# Patient Record
Sex: FEMALE | Race: BLACK OR AFRICAN AMERICAN | NOT HISPANIC OR LATINO | Employment: UNEMPLOYED | RURAL
[De-identification: names, ages, dates, MRNs, and addresses within clinical notes are randomized per-mention and may not be internally consistent; named-entity substitution may affect disease eponyms.]

---
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
## 2022-01-04 ENCOUNTER — HOSPITAL ENCOUNTER (OUTPATIENT)
Dept: RADIOLOGY | Facility: HOSPITAL | Age: 22
Discharge: HOME | End: 2022-01-04
Attending: SURGERY
Payer: COMMERCIAL

## 2022-01-04 DIAGNOSIS — K44.9 HIATAL HERNIA: ICD-10-CM

## 2022-01-04 PROCEDURE — 74220 FL ESOPHAGRAM COMPLETE: ICD-10-PCS | Mod: 26,,, | Performed by: STUDENT IN AN ORGANIZED HEALTH CARE EDUCATION/TRAINING PROGRAM

## 2022-01-04 PROCEDURE — 74220 X-RAY XM ESOPHAGUS 1CNTRST: CPT | Mod: 26,,, | Performed by: STUDENT IN AN ORGANIZED HEALTH CARE EDUCATION/TRAINING PROGRAM

## 2022-01-04 PROCEDURE — 74220 X-RAY XM ESOPHAGUS 1CNTRST: CPT | Mod: TC

## 2022-03-21 ENCOUNTER — OFFICE VISIT (OUTPATIENT)
Dept: PAIN MEDICINE | Facility: CLINIC | Age: 22
End: 2022-03-21
Payer: MEDICAID

## 2022-03-21 ENCOUNTER — HOSPITAL ENCOUNTER (OUTPATIENT)
Dept: RADIOLOGY | Facility: HOSPITAL | Age: 22
Discharge: HOME | End: 2022-03-21
Attending: PAIN MEDICINE
Payer: COMMERCIAL

## 2022-03-21 VITALS
HEART RATE: 62 BPM | DIASTOLIC BLOOD PRESSURE: 59 MMHG | WEIGHT: 132 LBS | SYSTOLIC BLOOD PRESSURE: 103 MMHG | RESPIRATION RATE: 18 BRPM | HEIGHT: 66 IN | BODY MASS INDEX: 21.21 KG/M2

## 2022-03-21 DIAGNOSIS — G89.29 CHRONIC BILATERAL LOW BACK PAIN WITHOUT SCIATICA: ICD-10-CM

## 2022-03-21 DIAGNOSIS — Z79.899 ENCOUNTER FOR LONG-TERM (CURRENT) USE OF OTHER MEDICATIONS: ICD-10-CM

## 2022-03-21 DIAGNOSIS — M41.9 SCOLIOSIS OF LUMBAR SPINE, UNSPECIFIED SCOLIOSIS TYPE: Primary | ICD-10-CM

## 2022-03-21 DIAGNOSIS — M54.50 CHRONIC BILATERAL LOW BACK PAIN WITHOUT SCIATICA: ICD-10-CM

## 2022-03-21 DIAGNOSIS — M54.6 THORACIC SPINE PAIN: ICD-10-CM

## 2022-03-21 LAB

## 2022-03-21 PROCEDURE — 80305 DRUG TEST PRSMV DIR OPT OBS: CPT | Mod: PBBFAC | Performed by: PAIN MEDICINE

## 2022-03-21 PROCEDURE — 72070 X-RAY EXAM THORAC SPINE 2VWS: CPT | Mod: TC

## 2022-03-21 PROCEDURE — 99214 OFFICE O/P EST MOD 30 MIN: CPT | Mod: PBBFAC | Performed by: PAIN MEDICINE

## 2022-03-21 PROCEDURE — 72100 X-RAY EXAM L-S SPINE 2/3 VWS: CPT | Mod: TC

## 2022-03-21 PROCEDURE — 3008F PR BODY MASS INDEX (BMI) DOCUMENTED: ICD-10-PCS | Mod: CPTII,,, | Performed by: PAIN MEDICINE

## 2022-03-21 PROCEDURE — 99213 OFFICE O/P EST LOW 20 MIN: CPT | Mod: S$PBB,,, | Performed by: PAIN MEDICINE

## 2022-03-21 PROCEDURE — 72100 X-RAY EXAM L-S SPINE 2/3 VWS: CPT | Mod: 26,,, | Performed by: RADIOLOGY

## 2022-03-21 PROCEDURE — 3074F PR MOST RECENT SYSTOLIC BLOOD PRESSURE < 130 MM HG: ICD-10-PCS | Mod: CPTII,,, | Performed by: PAIN MEDICINE

## 2022-03-21 PROCEDURE — G0481 DRUG TEST DEF 8-14 CLASSES: HCPCS | Mod: ,,, | Performed by: CLINICAL MEDICAL LABORATORY

## 2022-03-21 PROCEDURE — 3008F BODY MASS INDEX DOCD: CPT | Mod: CPTII,,, | Performed by: PAIN MEDICINE

## 2022-03-21 PROCEDURE — 3078F DIAST BP <80 MM HG: CPT | Mod: CPTII,,, | Performed by: PAIN MEDICINE

## 2022-03-21 PROCEDURE — 1159F MED LIST DOCD IN RCRD: CPT | Mod: CPTII,,, | Performed by: PAIN MEDICINE

## 2022-03-21 PROCEDURE — 99213 PR OFFICE/OUTPT VISIT, EST, LEVL III, 20-29 MIN: ICD-10-PCS | Mod: S$PBB,,, | Performed by: PAIN MEDICINE

## 2022-03-21 PROCEDURE — 1159F PR MEDICATION LIST DOCUMENTED IN MEDICAL RECORD: ICD-10-PCS | Mod: CPTII,,, | Performed by: PAIN MEDICINE

## 2022-03-21 PROCEDURE — G0481 PR DRUG TEST DEF 8-14 CLASSES: ICD-10-PCS | Mod: ,,, | Performed by: CLINICAL MEDICAL LABORATORY

## 2022-03-21 PROCEDURE — 72070 XR THORACIC SPINE AP LATERAL: ICD-10-PCS | Mod: 26,,, | Performed by: RADIOLOGY

## 2022-03-21 PROCEDURE — 3078F PR MOST RECENT DIASTOLIC BLOOD PRESSURE < 80 MM HG: ICD-10-PCS | Mod: CPTII,,, | Performed by: PAIN MEDICINE

## 2022-03-21 PROCEDURE — 72100 XR LUMBAR SPINE AP AND LATERAL: ICD-10-PCS | Mod: 26,,, | Performed by: RADIOLOGY

## 2022-03-21 PROCEDURE — 3074F SYST BP LT 130 MM HG: CPT | Mod: CPTII,,, | Performed by: PAIN MEDICINE

## 2022-03-21 PROCEDURE — 72070 X-RAY EXAM THORAC SPINE 2VWS: CPT | Mod: 26,,, | Performed by: RADIOLOGY

## 2022-03-21 RX ORDER — IBUPROFEN 800 MG/1
800 TABLET ORAL 3 TIMES DAILY
COMMUNITY
End: 2023-03-08 | Stop reason: ALTCHOICE

## 2022-03-21 RX ORDER — TRAMADOL HYDROCHLORIDE 50 MG/1
50 TABLET ORAL EVERY 12 HOURS PRN
Qty: 15 TABLET | Refills: 0 | Status: SHIPPED | OUTPATIENT
Start: 2022-03-21 | End: 2022-03-22 | Stop reason: CLARIF

## 2022-03-21 NOTE — PROGRESS NOTES
She Disclaimer: This note has been generated using voice-recognition software. There may be typographical errors that have been missed during proof-reading        Patient ID: Kd Joshua is a 21 y.o. female.      Chief Complaint: Back Pain      21-year-old female returns for re-evaluation of chronic lower back pain.  She has a long history of scoliosis and underwent Falcon gilles placement in [facility] in 2017.  She experienced significant pressure and  pain post surgery, eventually  requiring hardware removal,  September 2021. The pressure sensation resolved but the pain remains chronic.  The pain is axial without leg involvement.  She completed physical therapy, March 2022, at [facility] with some relief.  She has not received postsurgical x-rays or MRIs.  She presents today requesting medication management.              Pain Assessment  Pain Assessment: 0-10  Pain Score:   8  Pain Location: Back  Pain Orientation: Lower, Mid, Upper  Pain Descriptors: Aching, Dull, Constant  Pain Frequency: Continuous  Pain Onset: Awakened from sleep  Clinical Progression: Gradually worsening  Aggravating Factors: Bending, Stretching, Straightening, Kneeling, Squatting, Standing, Walking, Stairs  Pain Intervention(s): Ambulation/increased activity, Heat applied, Home medication      A's of Opioid Risk Assessment  Activity:Patient can  perform ADL.   Analgesia:Patients pain is not controlled by current medication.   Adverse Effects: Patient denies constipation or sedation.  Aberrant Behavior:  reviewed with no aberrant drug seeking/taking behavior.      Patient denies any suicidal or homicidal ideations    Physical Therapy/Home Exercise: yes      X-Ray Lumbar Spine AP And Lateral  Narrative: EXAMINATION:  XR LUMBAR SPINE AP AND LATERAL    CLINICAL HISTORY:  Low back pain, unspecified    COMPARISON:  1 June 2012    FINDINGS:  No fracture is seen. Vertebral body heights are normal.  There is a thoracolumbar  scoliosis with the lumbar component apex to the right at L2, Seo angle is estimated 19 degrees appears increased from previous..  The disc space heights are well-maintained.  No significant degenerative change is present.  Impression: Increased lumbar scoliosis.  No other changes.    Electronically signed by: Triston Mcfarland  Date:    03/21/2022  Time:    14:26  X-Ray Thoracic Spine AP Lateral  Narrative: EXAMINATION:  XR THORACIC SPINE AP LATERAL    CLINICAL HISTORY:  Pain in thoracic spine    COMPARISON:  16 August 2021    FINDINGS:  No fracture is seen.  Spinal rods have been removed.  The scoliosis appears unchanged from previous.  Vertebral body heights and alignment are stable.  The disc space heights are well-maintained.  No significant degenerative change is present.  Impression: Previous spinal rods have been removed.  Scoliosis appears unchanged.    Electronically signed by: Triston Mcfarland  Date:    03/21/2022  Time:    14:24      Review of Systems   Constitutional: Negative.    HENT: Negative.    Eyes: Negative.    Respiratory: Negative.    Cardiovascular: Negative.    Gastrointestinal: Negative.    Endocrine: Negative.    Genitourinary: Negative.    Musculoskeletal: Positive for arthralgias, back pain and myalgias.   Integumentary:  Negative.   Neurological: Negative.    Hematological: Negative.    Psychiatric/Behavioral: Positive for sleep disturbance.             Past Medical History:   Diagnosis Date    Celiac disease     Chlamydia infection     Endometriosis     Herpes simplex virus (HSV) infection     Mental disorder     anexity and depression    Ovarian cyst     Scoliosis      Past Surgical History:   Procedure Laterality Date    SPINAL FUSION  10/03/2017    SURGICAL REMOVAL OF ENDOMETRIOSIS  2014    TONSILLECTOMY  2014     Social History     Socioeconomic History    Marital status: Single   Tobacco Use    Smoking status: Never Smoker    Smokeless tobacco: Never Used   Substance and  Sexual Activity    Alcohol use: Never    Drug use: Yes     Types: Marijuana     Comment: every couple of days    Sexual activity: Yes     Partners: Male     Birth control/protection: Condom     Comment: Last sexual intercourse 9/9/2021     Family History   Problem Relation Age of Onset    Lupus Paternal Grandmother     Cancer Maternal Grandmother     Diabetes Maternal Grandfather     Cancer Paternal Aunt     Cancer Maternal Aunt     Cancer Maternal Uncle      Review of patient's allergies indicates:   Allergen Reactions    Codeine     Oxycodone Itching     has a current medication list which includes the following prescription(s): ibuprofen, valacyclovir, famotidine, and tramadol.      Objective:  Vitals:    03/21/22 1305   BP: (!) 103/59   Pulse: 62   Resp: 18        Physical Exam  Vitals and nursing note reviewed.   Constitutional:       General: She is not in acute distress.     Appearance: Normal appearance. She is not ill-appearing, toxic-appearing or diaphoretic.   HENT:      Head: Normocephalic and atraumatic.      Nose: Nose normal.      Mouth/Throat:      Mouth: Mucous membranes are moist.   Eyes:      Extraocular Movements: Extraocular movements intact.      Pupils: Pupils are equal, round, and reactive to light.   Cardiovascular:      Rate and Rhythm: Normal rate and regular rhythm.      Heart sounds: Normal heart sounds.   Pulmonary:      Effort: Pulmonary effort is normal. No respiratory distress.      Breath sounds: Normal breath sounds. No stridor. No wheezing or rhonchi.   Abdominal:      General: Bowel sounds are normal.      Palpations: Abdomen is soft.   Musculoskeletal:         General: No swelling or deformity.      Cervical back: Normal and normal range of motion. No spasms or tenderness. No pain with movement. Normal range of motion.      Thoracic back: Normal.      Lumbar back: Spasms and tenderness present. No bony tenderness. Decreased range of motion. Negative right straight leg  raise test and negative left straight leg raise test. No scoliosis.      Right lower leg: No edema.      Left lower leg: No edema.   Skin:     General: Skin is warm.   Neurological:      General: No focal deficit present.      Mental Status: She is alert and oriented to person, place, and time. Mental status is at baseline.      Cranial Nerves: Cranial nerves are intact. No cranial nerve deficit.      Sensory: Sensation is intact. No sensory deficit.      Motor: No weakness.      Coordination: Coordination normal.      Gait: Gait normal.      Deep Tendon Reflexes: Reflexes are normal and symmetric.   Psychiatric:         Mood and Affect: Mood normal.         Behavior: Behavior normal.           Assessment:      1. Scoliosis of lumbar spine, unspecified scoliosis type    2. Chronic bilateral low back pain without sciatica    3. Thoracic spine pain    4. Encounter for long-term (current) use of other medications          Plan:  1. reviewed  2.Addiction, Dependency, Tolerance, Opioid abuse-misuse, Death, Diversion Discussed. Overdose reversal drug Naloxone discussed.  3. No opiates prescribed secondary to THC on urine drug screen POC  4. I will obtain x-rays of the thoracic and lumbar spine today  5. Urine drug screen point of care obtained and consistent with prescribed medications and medication refill date.  We will obtain a definitive urine drug screen  due to findings of THC on urine drug screen point of care.     Orders Placed This Encounter   Procedures    X-Ray Thoracic Spine AP Lateral     Standing Status:   Future     Number of Occurrences:   1     Standing Expiration Date:   3/21/2023     Order Specific Question:   May the Radiologist modify the order per protocol to meet the clinical needs of the patient?     Answer:   Yes     Order Specific Question:   Release to patient     Answer:   Immediate    Drug Screen Definitive 14, Urine     Standing Status:   Future     Number of Occurrences:   1      Standing Expiration Date:   5/20/2023     Order Specific Question:   Specimen Source     Answer:   Urine    POCT Urine Drug Screen Presump     Interpretive Information:     Negative:  No drug detected at the cut off level.   Positive:  This result represents presumptive positive for the   tested drug, other substances may yield a positive response other   than the analyte of interest. This result should be utilized for   diagnostic purpose only. Confirmation testing will be performed upon physician request only.         6. Consider Ultram and ibuprofen for chronic pain pending urine drug screen findings  7. Return in 2-3 weeks with me  for re-evaluation and medication refill       report:  Reviewed and inconsistent with medication use as prescribed. Report showing aberrant drug behavior and multi-sourcing.      The total time spent for evaluation and management on 03/22/2022 including reviewing separately obtained history, performing a medically appropriate exam and evaluation, documenting clinical information in the health record, independently interpreting results and communicating them to the patient/family/caregiver, and ordering medications/tests/procedures was between 15-29 minutes.    The above plan and management options were discussed at length with patient. Patient is in agreement with the above and verbalized understanding. It will be communicated with the referring physician via electronic record, fax, or mail.

## 2022-03-23 LAB
6-ACETYLMORPHINE, URINE (RUSH): NEGATIVE 10 NG/ML
7-AMINOCLONAZEPAM, URINE (RUSH): NEGATIVE 25 NG/ML
A-HYDROXYALPRAZOLAM, URINE (RUSH): NEGATIVE 25 NG/ML
ACETYL FENTANYL, URINE (RUSH): NEGATIVE 2.5 NG/ML
ACETYL NORFENTANYL OXALATE, URINE (RUSH): NEGATIVE 5 NG/ML
AMPHET UR QL SCN: NEGATIVE 100 NG/ML
BENZOYLECGONINE, URINE (RUSH): NEGATIVE 100 NG/ML
BUPRENORPHINE UR QL SCN: NEGATIVE 25 NG/ML
CODEINE, URINE (RUSH): NEGATIVE 25 NG/ML
CREAT UR-MCNC: 295 MG/DL (ref 28–219)
EDDP, URINE (RUSH): NEGATIVE 25 NG/ML
FENTANYL, URINE (RUSH): NEGATIVE 2.5 NG/ML
HYDROCODONE, URINE (RUSH): NEGATIVE 25 NG/ML
HYDROMORPHONE, URINE (RUSH): NEGATIVE 25 NG/ML
LORAZEPAM, URINE (RUSH): NEGATIVE 25 NG/ML
METHADONE UR QL SCN: NEGATIVE 25 NG/ML
METHAMPHET UR QL SCN: NEGATIVE 100 NG/ML
MORPHINE, URINE (RUSH): NEGATIVE 25 NG/ML
NORBUPRENORPHINE, URINE (RUSH): NEGATIVE 25 NG/ML
NORDIAZEPAM, URINE (RUSH): NEGATIVE 25 NG/ML
NORFENTANYL OXALATE, URINE (RUSH): NEGATIVE 5 NG/ML
NORHYDROCODONE, URINE (RUSH): NEGATIVE 50 NG/ML
NOROXYCODONE HCL, URINE (RUSH): NEGATIVE 50 NG/ML
OXAZEPAM, URINE (RUSH): NEGATIVE 25 NG/ML
OXYCODONE UR QL SCN: NEGATIVE 25 NG/ML
OXYMORPHONE, URINE (RUSH): NEGATIVE 25 NG/ML
PH UR STRIP: 7 PH UNITS
SP GR UR STRIP: 1.01
TAPENTADOL, URINE (RUSH): NEGATIVE 25 NG/ML
TEMAZEPAM, URINE (RUSH): NEGATIVE 25 NG/ML
THC-COOH, URINE (RUSH): >250 25 NG/ML
TRAMADOL, URINE (RUSH): NEGATIVE 100 NG/ML

## 2022-03-26 ENCOUNTER — PATIENT MESSAGE (OUTPATIENT)
Dept: PAIN MEDICINE | Facility: CLINIC | Age: 22
End: 2022-03-26
Payer: MEDICAID

## 2022-03-28 ENCOUNTER — TELEPHONE (OUTPATIENT)
Dept: PAIN MEDICINE | Facility: CLINIC | Age: 22
End: 2022-03-28
Payer: MEDICAID

## 2022-03-30 NOTE — TELEPHONE ENCOUNTER
Dr Luciano was notified of THC >250 on yesterday in which states she is not able to prescribe the tramadol, pt was notified via portal.

## 2022-04-12 NOTE — PROGRESS NOTES
She Disclaimer: This note has been generated using voice-recognition software. There may be typographical errors that have been missed during proof-reading        Patient ID: Kd Joshua is a 22 y.o. female.      Chief Complaint: Low-back Pain and Leg Pain (Mid to low back pain left leg)      22-year-old female returns today for re-evaluation of thoracic and lumbar pain.  X-rays were reviewed and discussed with patient.  She had Falcon rods removed at [facility] in 2021 and now the  lumbar spine spine x-ray reveales increased scoliosis by 19°.  She has not returned to [facility] but presents today requesting treatment for her severe spasticity,  pain and discomfort.  She notes the marijuana is only thing that has provided any effective pain relief at this time.            Pain Assessment  Pain Assessment: 0-10  Pain Score:   8  Pain Location: Back  Pain Orientation: Mid, Lower  Pain Radiating Towards: left leg spasms  Pain Descriptors: Constant, Pins and needles, Aching, Dull  Pain Frequency: Continuous  Pain Onset: Awakened from sleep  Clinical Progression: Not changed  Aggravating Factors: Bending, Kneeling, Squatting, Standing, Walking  Pain Intervention(s): Home medication, Heat applied, Rest      A's of Opioid Risk Assessment  Activity:Patient can  perform ADL.   Analgesia:Patients pain is not controlled by current medication.   Adverse Effects: Patient denies constipation or sedation.  Aberrant Behavior:  reviewed with no aberrant drug seeking/taking behavior.      Patient denies any suicidal or homicidal ideations    Physical Therapy/Home Exercise: yes      X-Ray Lumbar Spine AP And Lateral  Narrative: EXAMINATION:  XR LUMBAR SPINE AP AND LATERAL    CLINICAL HISTORY:  Low back pain, unspecified    COMPARISON:  1 June 2012    FINDINGS:  No fracture is seen. Vertebral body heights are normal.  There is a thoracolumbar scoliosis with the lumbar component apex to the right at L2, Seo angle is estimated 19 degrees  appears increased from previous..  The disc space heights are well-maintained.  No significant degenerative change is present.  Impression: Increased lumbar scoliosis.  No other changes.    Electronically signed by: Triston Mcfarland  Date:    03/21/2022  Time:    14:26  X-Ray Thoracic Spine AP Lateral  Narrative: EXAMINATION:  XR THORACIC SPINE AP LATERAL    CLINICAL HISTORY:  Pain in thoracic spine    COMPARISON:  16 August 2021    FINDINGS:  No fracture is seen.  Spinal rods have been removed.  The scoliosis appears unchanged from previous.  Vertebral body heights and alignment are stable.  The disc space heights are well-maintained.  No significant degenerative change is present.  Impression: Previous spinal rods have been removed.  Scoliosis appears unchanged.    Electronically signed by: Triston Mcfarland  Date:    03/21/2022  Time:    14:24      Review of Systems   Constitutional: Negative.    HENT: Negative.    Eyes: Negative.    Respiratory: Negative.    Cardiovascular: Negative.    Gastrointestinal: Negative.    Endocrine: Negative.    Genitourinary: Negative.    Musculoskeletal: Positive for arthralgias, back pain and myalgias.   Integumentary:  Negative.   Neurological: Negative.    Hematological: Negative.    Psychiatric/Behavioral: Negative.              Past Medical History:   Diagnosis Date    Celiac disease     Chlamydia infection     Endometriosis     Herpes simplex virus (HSV) infection     Mental disorder     anexity and depression    Ovarian cyst     Scoliosis      Past Surgical History:   Procedure Laterality Date    SPINAL FUSION  10/03/2017    SURGICAL REMOVAL OF ENDOMETRIOSIS  2014    TONSILLECTOMY  2014     Social History     Socioeconomic History    Marital status: Single   Tobacco Use    Smoking status: Never Smoker    Smokeless tobacco: Never Used   Substance and Sexual Activity    Alcohol use: Never    Drug use: Yes     Types: Marijuana     Comment: every couple of days     Sexual activity: Yes     Partners: Male     Birth control/protection: Condom     Comment: Last sexual intercourse 9/9/2021     Family History   Problem Relation Age of Onset    Lupus Paternal Grandmother     Cancer Maternal Grandmother     Diabetes Maternal Grandfather     Cancer Paternal Aunt     Cancer Maternal Aunt     Cancer Maternal Uncle      Review of patient's allergies indicates:   Allergen Reactions    Codeine     Oxycodone Itching     has a current medication list which includes the following prescription(s): ibuprofen, valacyclovir, amitriptyline, famotidine, pregabalin, and tizanidine.      Objective:  Vitals:    04/13/22 1427   BP: 104/64   Pulse: 98   Resp: 18        Physical Exam  Vitals and nursing note reviewed.   Constitutional:       General: She is not in acute distress.     Appearance: Normal appearance. She is not ill-appearing, toxic-appearing or diaphoretic.   HENT:      Head: Normocephalic and atraumatic.      Nose: Nose normal.      Mouth/Throat:      Mouth: Mucous membranes are moist.   Eyes:      Extraocular Movements: Extraocular movements intact.      Pupils: Pupils are equal, round, and reactive to light.   Cardiovascular:      Rate and Rhythm: Normal rate and regular rhythm.      Heart sounds: Normal heart sounds.   Pulmonary:      Effort: Pulmonary effort is normal. No respiratory distress.      Breath sounds: Normal breath sounds. No stridor. No wheezing or rhonchi.   Abdominal:      General: Bowel sounds are normal.      Palpations: Abdomen is soft.   Musculoskeletal:         General: No swelling or deformity.      Cervical back: Normal and normal range of motion. No spasms or tenderness. No pain with movement. Normal range of motion.      Thoracic back: Normal.      Lumbar back: Spasms and tenderness present. No bony tenderness. Decreased range of motion. Negative right straight leg raise test and negative left straight leg raise test. Scoliosis present.      Right lower  leg: No edema.      Left lower leg: No edema.   Skin:     General: Skin is warm.   Neurological:      General: No focal deficit present.      Mental Status: She is alert and oriented to person, place, and time. Mental status is at baseline.      Cranial Nerves: Cranial nerves are intact. No cranial nerve deficit.      Sensory: Sensation is intact. No sensory deficit.      Motor: No weakness.      Coordination: Coordination normal.      Gait: Gait normal.      Deep Tendon Reflexes: Reflexes are normal and symmetric.   Psychiatric:         Mood and Affect: Mood normal.         Behavior: Behavior normal.           Assessment:      1. Scoliosis of lumbar spine, unspecified scoliosis type    2. Chronic bilateral low back pain without sciatica    3. Thoracic spine pain          Plan:  1. reviewed  2.Addiction, Dependency, Tolerance, Opioid abuse-misuse, Death, Diversion Discussed. Overdose reversal drug Naloxone discussed.  3.Refill/Continue medications for pain control and function.  Start Elavil, Zanaflex and Lyrica for chronic pain and spasticity       Requested Prescriptions     Signed Prescriptions Disp Refills    amitriptyline (ELAVIL) 25 MG tablet 30 tablet 0     Sig: Take 1 tablet (25 mg total) by mouth nightly as needed for Insomnia.    tiZANidine (ZANAFLEX) 4 MG tablet 90 tablet 0     Sig: Take 1 tablet (4 mg total) by mouth 3 (three) times daily.    pregabalin (LYRICA) 75 MG capsule 60 capsule 0     Sig: Take 1 capsule (75 mg total) by mouth 2 (two) times daily.     4. Patient informed to follow-up at [facility] for worsening scoliosis   5.Return in 1 month with me  for re-evaluation and medication refill       report:  Reviewed and consistent with medication use as prescribed.      The total time spent for evaluation and management on 04/13/2022 including reviewing separately obtained history, performing a medically appropriate exam and evaluation, documenting clinical information in the health record,  independently interpreting results and communicating them to the patient/family/caregiver, and ordering medications/tests/procedures was between 15-29 minutes.    The above plan and management options were discussed at length with patient. Patient is in agreement with the above and verbalized understanding. It will be communicated with the referring physician via electronic record, fax, or mail.

## 2022-04-13 ENCOUNTER — OFFICE VISIT (OUTPATIENT)
Dept: PAIN MEDICINE | Facility: CLINIC | Age: 22
End: 2022-04-13
Payer: MEDICAID

## 2022-04-13 VITALS
HEART RATE: 98 BPM | BODY MASS INDEX: 20.41 KG/M2 | HEIGHT: 66 IN | DIASTOLIC BLOOD PRESSURE: 64 MMHG | WEIGHT: 127 LBS | RESPIRATION RATE: 18 BRPM | SYSTOLIC BLOOD PRESSURE: 104 MMHG

## 2022-04-13 DIAGNOSIS — M54.6 THORACIC SPINE PAIN: ICD-10-CM

## 2022-04-13 DIAGNOSIS — M41.9 SCOLIOSIS OF LUMBAR SPINE, UNSPECIFIED SCOLIOSIS TYPE: Primary | ICD-10-CM

## 2022-04-13 DIAGNOSIS — M54.50 CHRONIC BILATERAL LOW BACK PAIN WITHOUT SCIATICA: ICD-10-CM

## 2022-04-13 DIAGNOSIS — G89.29 CHRONIC BILATERAL LOW BACK PAIN WITHOUT SCIATICA: ICD-10-CM

## 2022-04-13 PROCEDURE — 3008F BODY MASS INDEX DOCD: CPT | Mod: CPTII,,, | Performed by: PAIN MEDICINE

## 2022-04-13 PROCEDURE — 3074F PR MOST RECENT SYSTOLIC BLOOD PRESSURE < 130 MM HG: ICD-10-PCS | Mod: CPTII,,, | Performed by: PAIN MEDICINE

## 2022-04-13 PROCEDURE — 99213 OFFICE O/P EST LOW 20 MIN: CPT | Mod: PBBFAC | Performed by: PAIN MEDICINE

## 2022-04-13 PROCEDURE — 99213 OFFICE O/P EST LOW 20 MIN: CPT | Mod: S$PBB,,, | Performed by: PAIN MEDICINE

## 2022-04-13 PROCEDURE — 3008F PR BODY MASS INDEX (BMI) DOCUMENTED: ICD-10-PCS | Mod: CPTII,,, | Performed by: PAIN MEDICINE

## 2022-04-13 PROCEDURE — 3074F SYST BP LT 130 MM HG: CPT | Mod: CPTII,,, | Performed by: PAIN MEDICINE

## 2022-04-13 PROCEDURE — 1159F MED LIST DOCD IN RCRD: CPT | Mod: CPTII,,, | Performed by: PAIN MEDICINE

## 2022-04-13 PROCEDURE — 1159F PR MEDICATION LIST DOCUMENTED IN MEDICAL RECORD: ICD-10-PCS | Mod: CPTII,,, | Performed by: PAIN MEDICINE

## 2022-04-13 PROCEDURE — 99213 PR OFFICE/OUTPT VISIT, EST, LEVL III, 20-29 MIN: ICD-10-PCS | Mod: S$PBB,,, | Performed by: PAIN MEDICINE

## 2022-04-13 PROCEDURE — 3078F PR MOST RECENT DIASTOLIC BLOOD PRESSURE < 80 MM HG: ICD-10-PCS | Mod: CPTII,,, | Performed by: PAIN MEDICINE

## 2022-04-13 PROCEDURE — 3078F DIAST BP <80 MM HG: CPT | Mod: CPTII,,, | Performed by: PAIN MEDICINE

## 2022-04-13 RX ORDER — AMITRIPTYLINE HYDROCHLORIDE 25 MG/1
25 TABLET, FILM COATED ORAL NIGHTLY PRN
Qty: 30 TABLET | Refills: 0 | Status: SHIPPED | OUTPATIENT
Start: 2022-04-13 | End: 2022-05-06

## 2022-04-13 RX ORDER — PREGABALIN 75 MG/1
75 CAPSULE ORAL 2 TIMES DAILY
Qty: 60 CAPSULE | Refills: 0 | Status: SHIPPED | OUTPATIENT
Start: 2022-04-13 | End: 2022-06-20 | Stop reason: SDUPTHER

## 2022-04-13 RX ORDER — TIZANIDINE 4 MG/1
4 TABLET ORAL 3 TIMES DAILY
Qty: 90 TABLET | Refills: 0 | Status: SHIPPED | OUTPATIENT
Start: 2022-04-13 | End: 2022-05-06

## 2022-06-17 ENCOUNTER — OFFICE VISIT (OUTPATIENT)
Dept: FAMILY MEDICINE | Facility: CLINIC | Age: 22
End: 2022-06-17
Payer: MEDICAID

## 2022-06-17 VITALS
RESPIRATION RATE: 16 BRPM | OXYGEN SATURATION: 99 % | HEART RATE: 87 BPM | TEMPERATURE: 98 F | WEIGHT: 130.38 LBS | BODY MASS INDEX: 20.95 KG/M2 | DIASTOLIC BLOOD PRESSURE: 81 MMHG | SYSTOLIC BLOOD PRESSURE: 133 MMHG | HEIGHT: 66 IN

## 2022-06-17 DIAGNOSIS — N80.9 ENDOMETRIOSIS: Primary | ICD-10-CM

## 2022-06-17 PROCEDURE — 1159F MED LIST DOCD IN RCRD: CPT | Mod: CPTII,,, | Performed by: FAMILY MEDICINE

## 2022-06-17 PROCEDURE — 99213 OFFICE O/P EST LOW 20 MIN: CPT | Mod: ,,, | Performed by: FAMILY MEDICINE

## 2022-06-17 PROCEDURE — 1159F PR MEDICATION LIST DOCUMENTED IN MEDICAL RECORD: ICD-10-PCS | Mod: CPTII,,, | Performed by: FAMILY MEDICINE

## 2022-06-17 PROCEDURE — 3079F DIAST BP 80-89 MM HG: CPT | Mod: CPTII,,, | Performed by: FAMILY MEDICINE

## 2022-06-17 PROCEDURE — 3075F SYST BP GE 130 - 139MM HG: CPT | Mod: CPTII,,, | Performed by: FAMILY MEDICINE

## 2022-06-17 PROCEDURE — 3075F PR MOST RECENT SYSTOLIC BLOOD PRESS GE 130-139MM HG: ICD-10-PCS | Mod: CPTII,,, | Performed by: FAMILY MEDICINE

## 2022-06-17 PROCEDURE — 3008F BODY MASS INDEX DOCD: CPT | Mod: CPTII,,, | Performed by: FAMILY MEDICINE

## 2022-06-17 PROCEDURE — 3079F PR MOST RECENT DIASTOLIC BLOOD PRESSURE 80-89 MM HG: ICD-10-PCS | Mod: CPTII,,, | Performed by: FAMILY MEDICINE

## 2022-06-17 PROCEDURE — 3008F PR BODY MASS INDEX (BMI) DOCUMENTED: ICD-10-PCS | Mod: CPTII,,, | Performed by: FAMILY MEDICINE

## 2022-06-17 PROCEDURE — 99213 PR OFFICE/OUTPT VISIT, EST, LEVL III, 20-29 MIN: ICD-10-PCS | Mod: ,,, | Performed by: FAMILY MEDICINE

## 2022-06-17 RX ORDER — ONDANSETRON 4 MG/1
8 TABLET, ORALLY DISINTEGRATING ORAL EVERY 8 HOURS PRN
Qty: 18 TABLET | Refills: 1 | Status: SHIPPED | OUTPATIENT
Start: 2022-06-17 | End: 2023-03-21 | Stop reason: ALTCHOICE

## 2022-06-17 RX ORDER — TRAMADOL HYDROCHLORIDE 50 MG/1
50 TABLET ORAL EVERY 6 HOURS
Qty: 15 TABLET | Refills: 0 | Status: SHIPPED | OUTPATIENT
Start: 2022-06-17 | End: 2023-03-08 | Stop reason: ALTCHOICE

## 2022-06-17 NOTE — PROGRESS NOTES
Subjective:       Patient ID: Kd Joshua is a 22 y.o. female.    Chief Complaint: Abdominal Pain (Patient present to clinic with lower abdominal cramps starting this morning around 0700. Have hx of endometriosis and ovary cyst. )    No fever no vaginal discharge pain due to endometriosis is chronic.  She tried hormone therapy which did not help she is seeing a new gynecologist.    Review of Systems      Objective:      Physical Exam  Constitutional:       General: She is in acute distress.      Appearance: She is not ill-appearing.   Cardiovascular:      Rate and Rhythm: Normal rate and regular rhythm.   Pulmonary:      Effort: Pulmonary effort is normal.      Breath sounds: Normal breath sounds.   Abdominal:      Tenderness: There is abdominal tenderness (Mild suprapubic in right adnexal tenderness no guarding).   Neurological:      Mental Status: She is alert.         Assessment:       Problem List Items Addressed This Visit    None         Plan:         patient advised that endometriosis is very difficult to treat.  Only definitive treatment is hysterectomy, which at the age of 22 obviously does not want to have done

## 2022-06-17 NOTE — LETTER
June 17, 2022      [facility]  [address] [place] [address] [place] [id number]  Phone: [phone]  Fax: [phone]       Patient: Kd Joshua   YOB: 2000  Date of Visit: 06/17/2022    To Whom It May Concern:    Edgardo Joshua  was at [facility] on 06/17/2022. The patient may return to work/school on 06/18/2022 with no restrictions. If you have any questions or concerns, or if I can be of further assistance, please do not hesitate to contact me.    Sincerely,    Dr. Kenny Blackburn

## 2022-06-20 ENCOUNTER — OFFICE VISIT (OUTPATIENT)
Dept: PAIN MEDICINE | Facility: CLINIC | Age: 22
End: 2022-06-20
Payer: MEDICAID

## 2022-06-20 VITALS
DIASTOLIC BLOOD PRESSURE: 82 MMHG | SYSTOLIC BLOOD PRESSURE: 124 MMHG | HEART RATE: 65 BPM | WEIGHT: 128.63 LBS | HEIGHT: 67 IN | BODY MASS INDEX: 20.19 KG/M2

## 2022-06-20 DIAGNOSIS — M54.50 CHRONIC BILATERAL LOW BACK PAIN WITHOUT SCIATICA: Chronic | ICD-10-CM

## 2022-06-20 DIAGNOSIS — M41.9 SCOLIOSIS OF LUMBAR SPINE, UNSPECIFIED SCOLIOSIS TYPE: Primary | Chronic | ICD-10-CM

## 2022-06-20 DIAGNOSIS — M54.6 THORACIC SPINE PAIN: Chronic | ICD-10-CM

## 2022-06-20 DIAGNOSIS — G89.29 CHRONIC BILATERAL LOW BACK PAIN WITHOUT SCIATICA: Chronic | ICD-10-CM

## 2022-06-20 PROCEDURE — 3008F BODY MASS INDEX DOCD: CPT | Mod: CPTII,,, | Performed by: PAIN MEDICINE

## 2022-06-20 PROCEDURE — 3074F PR MOST RECENT SYSTOLIC BLOOD PRESSURE < 130 MM HG: ICD-10-PCS | Mod: CPTII,,, | Performed by: PAIN MEDICINE

## 2022-06-20 PROCEDURE — 3079F DIAST BP 80-89 MM HG: CPT | Mod: CPTII,,, | Performed by: PAIN MEDICINE

## 2022-06-20 PROCEDURE — 3008F PR BODY MASS INDEX (BMI) DOCUMENTED: ICD-10-PCS | Mod: CPTII,,, | Performed by: PAIN MEDICINE

## 2022-06-20 PROCEDURE — 99214 OFFICE O/P EST MOD 30 MIN: CPT | Mod: S$PBB,,, | Performed by: PAIN MEDICINE

## 2022-06-20 PROCEDURE — 99214 PR OFFICE/OUTPT VISIT, EST, LEVL IV, 30-39 MIN: ICD-10-PCS | Mod: S$PBB,,, | Performed by: PAIN MEDICINE

## 2022-06-20 PROCEDURE — 3079F PR MOST RECENT DIASTOLIC BLOOD PRESSURE 80-89 MM HG: ICD-10-PCS | Mod: CPTII,,, | Performed by: PAIN MEDICINE

## 2022-06-20 PROCEDURE — 1159F MED LIST DOCD IN RCRD: CPT | Mod: CPTII,,, | Performed by: PAIN MEDICINE

## 2022-06-20 PROCEDURE — 1159F PR MEDICATION LIST DOCUMENTED IN MEDICAL RECORD: ICD-10-PCS | Mod: CPTII,,, | Performed by: PAIN MEDICINE

## 2022-06-20 PROCEDURE — 99213 OFFICE O/P EST LOW 20 MIN: CPT | Mod: PBBFAC | Performed by: PAIN MEDICINE

## 2022-06-20 PROCEDURE — 3074F SYST BP LT 130 MM HG: CPT | Mod: CPTII,,, | Performed by: PAIN MEDICINE

## 2022-06-20 RX ORDER — TIZANIDINE 4 MG/1
4 TABLET ORAL 3 TIMES DAILY
Qty: 90 TABLET | Refills: 2 | Status: SHIPPED | OUTPATIENT
Start: 2022-06-20 | End: 2023-03-21 | Stop reason: ALTCHOICE

## 2022-06-20 RX ORDER — PREGABALIN 75 MG/1
75 CAPSULE ORAL 2 TIMES DAILY
Qty: 60 CAPSULE | Refills: 2 | Status: SHIPPED | OUTPATIENT
Start: 2022-06-20 | End: 2022-09-18

## 2022-06-20 RX ORDER — AMITRIPTYLINE HYDROCHLORIDE 25 MG/1
25 TABLET, FILM COATED ORAL NIGHTLY
Qty: 90 TABLET | Refills: 2 | Status: SHIPPED | OUTPATIENT
Start: 2022-06-20 | End: 2023-03-08 | Stop reason: SDUPTHER

## 2022-06-20 NOTE — PROGRESS NOTES
She Disclaimer: This note has been generated using voice-recognition software. There may be typographical errors that have been missed during proof-reading        Patient ID: Kd Joshua is a 22 y.o. female.      Chief Complaint: Low-back Pain, Mid-back Pain, and Leg Pain      22-year-old female returns for re-evaluation of chronic lower back pain.  She is status post Falcon rods for scoliosis in 2017 at [facility]. The pain returned, but she is not a surgical candidate.  Elavil, Zanaflex and Lyrica are currently providing excellent relief without side effects.  She returns today for medication refill.  She denies any additional changes since the last office visit.                Pain Assessment  Pain Score:   8  Pain Location: Other (Comment) (mid back, lower back)  Pain Orientation: Left  Pain Radiating Towards: radiates to left leg to knee  Pain Descriptors: Stabbing, Sharp  Pain Frequency: Intermittent  Pain Onset: Awakened from sleep  Clinical Progression: Not changed  Aggravating Factors: Standing, Walking, Other (Comment) (sitting and lying)  Pain Intervention(s): Medication (See eMAR), Heat applied      A's of Opioid Risk Assessment  Activity:Patient can perform ADL.   Analgesia:Patients pain is  controlled by current medication.   Adverse Effects: Patient denies constipation or sedation.  Aberrant Behavior:  reviewed with no aberrant drug seeking/taking behavior.      Patient denies any suicidal or homicidal ideations    Physical Therapy/Home Exercise: yes      X-Ray Lumbar Spine AP And Lateral  Narrative: EXAMINATION:  XR LUMBAR SPINE AP AND LATERAL    CLINICAL HISTORY:  Low back pain, unspecified    COMPARISON:  1 June 2012    FINDINGS:  No fracture is seen. Vertebral body heights are normal.  There is a thoracolumbar scoliosis with the lumbar component apex to the right at L2, Seo angle is estimated 19 degrees appears increased from previous..  The disc space heights are well-maintained.  No  significant degenerative change is present.  Impression: Increased lumbar scoliosis.  No other changes.    Electronically signed by: Triston Mcfarland  Date:    03/21/2022  Time:    14:26  X-Ray Thoracic Spine AP Lateral  Narrative: EXAMINATION:  XR THORACIC SPINE AP LATERAL    CLINICAL HISTORY:  Pain in thoracic spine    COMPARISON:  16 August 2021    FINDINGS:  No fracture is seen.  Spinal rods have been removed.  The scoliosis appears unchanged from previous.  Vertebral body heights and alignment are stable.  The disc space heights are well-maintained.  No significant degenerative change is present.  Impression: Previous spinal rods have been removed.  Scoliosis appears unchanged.    Electronically signed by: Triston Mcfarland  Date:    03/21/2022  Time:    14:24      Review of Systems   Constitutional: Negative.    HENT: Negative.    Eyes: Negative.    Respiratory: Negative.    Cardiovascular: Negative.    Gastrointestinal: Negative.    Endocrine: Negative.    Genitourinary: Negative.    Musculoskeletal: Positive for arthralgias, back pain and myalgias.   Integumentary:  Negative.   Neurological: Negative.    Hematological: Negative.    Psychiatric/Behavioral: Negative.              Past Medical History:   Diagnosis Date    Celiac disease     Chlamydia infection     Endometriosis     Herpes simplex virus (HSV) infection     Mental disorder     anexity and depression    Ovarian cyst     Scoliosis      Past Surgical History:   Procedure Laterality Date    SPINAL FUSION  10/03/2017    SURGICAL REMOVAL OF ENDOMETRIOSIS  2014    TONSILLECTOMY  2014     Social History     Socioeconomic History    Marital status: Single   Tobacco Use    Smoking status: Never Smoker    Smokeless tobacco: Never Used   Substance and Sexual Activity    Alcohol use: Never    Drug use: Yes     Types: Marijuana     Comment: every couple of days    Sexual activity: Yes     Partners: Male     Birth control/protection: Condom      Comment: Last sexual intercourse 9/9/2021     Family History   Problem Relation Age of Onset    Lupus Paternal Grandmother     Cancer Maternal Grandmother     Diabetes Maternal Grandfather     Cancer Paternal Aunt     Cancer Maternal Aunt     Cancer Maternal Uncle      Review of patient's allergies indicates:   Allergen Reactions    Codeine     Oxycodone Itching     has a current medication list which includes the following prescription(s): ibuprofen, ondansetron, tramadol, valacyclovir, amitriptyline, famotidine, pregabalin, and tizanidine.      Objective:  Vitals:    06/20/22 1358   BP: 124/82   Pulse: 65        Physical Exam  Vitals and nursing note reviewed.   Constitutional:       General: She is not in acute distress.     Appearance: Normal appearance. She is not ill-appearing, toxic-appearing or diaphoretic.   HENT:      Head: Normocephalic and atraumatic.      Nose: Nose normal.      Mouth/Throat:      Mouth: Mucous membranes are moist.   Eyes:      Extraocular Movements: Extraocular movements intact.      Pupils: Pupils are equal, round, and reactive to light.   Cardiovascular:      Rate and Rhythm: Normal rate and regular rhythm.      Heart sounds: Normal heart sounds.   Pulmonary:      Effort: Pulmonary effort is normal. No respiratory distress.      Breath sounds: Normal breath sounds. No stridor. No wheezing or rhonchi.   Abdominal:      General: Bowel sounds are normal.      Palpations: Abdomen is soft.   Musculoskeletal:         General: No swelling, tenderness or deformity.      Cervical back: Normal and normal range of motion. No spasms or tenderness. No pain with movement. Normal range of motion.      Thoracic back: Normal.      Lumbar back: No spasms, tenderness or bony tenderness. Decreased range of motion. Negative right straight leg raise test and negative left straight leg raise test. No scoliosis.      Right lower leg: No edema.      Left lower leg: No edema.   Skin:     General: Skin  is warm.   Neurological:      General: No focal deficit present.      Mental Status: She is alert and oriented to person, place, and time. Mental status is at baseline.      Cranial Nerves: No cranial nerve deficit.      Sensory: Sensation is intact. No sensory deficit.      Motor: No weakness.      Coordination: Coordination normal.      Gait: Gait normal.      Deep Tendon Reflexes: Reflexes are normal and symmetric.   Psychiatric:         Mood and Affect: Mood normal.         Behavior: Behavior normal.           Assessment:      1. Scoliosis of lumbar spine, unspecified scoliosis type    2. Chronic bilateral low back pain without sciatica    3. Thoracic spine pain          Plan:  1. reviewed  2.Addiction, Dependency, Tolerance, Opioid abuse-misuse, Death, Diversion Discussed. Overdose reversal drug Naloxone discussed.  3.Refill/Continue medications for pain control and function       Requested Prescriptions     Signed Prescriptions Disp Refills    amitriptyline (ELAVIL) 25 MG tablet 90 tablet 2     Sig: Take 1 tablet (25 mg total) by mouth nightly.    pregabalin (LYRICA) 75 MG capsule 60 capsule 2     Sig: Take 1 capsule (75 mg total) by mouth 2 (two) times daily.    tiZANidine (ZANAFLEX) 4 MG tablet 90 tablet 2     Sig: Take 1 tablet (4 mg total) by mouth 3 (three) times daily.     4.Patient defers nerve block injections, physical therapy or surgical consultation     5.Return in 3 months with me  for re-evaluation and medication refill       report:  Reviewed and consistent with medication use as prescribed.      The total time spent for evaluation and management on 06/20/2022 including reviewing separately obtained history, performing a medically appropriate exam and evaluation, documenting clinical information in the health record, independently interpreting results and communicating them to the patient/family/caregiver, and ordering medications/tests/procedures was between 15-29 minutes.    The above  plan and management options were discussed at length with patient. Patient is in agreement with the above and verbalized understanding. It will be communicated with the referring physician via electronic record, fax, or mail.

## 2022-06-21 ENCOUNTER — OFFICE VISIT (OUTPATIENT)
Dept: OBSTETRICS AND GYNECOLOGY | Facility: CLINIC | Age: 22
End: 2022-06-21
Payer: MEDICAID

## 2022-06-21 VITALS — OXYGEN SATURATION: 99 % | WEIGHT: 132.19 LBS | HEIGHT: 67 IN | BODY MASS INDEX: 20.75 KG/M2

## 2022-06-21 DIAGNOSIS — Z32.02 URINE PREGNANCY TEST NEGATIVE: ICD-10-CM

## 2022-06-21 DIAGNOSIS — R10.2 PELVIC PAIN: ICD-10-CM

## 2022-06-21 DIAGNOSIS — Z87.42 HISTORY OF ENDOMETRIOSIS: Primary | ICD-10-CM

## 2022-06-21 LAB
B-HCG UR QL: NEGATIVE
BILIRUB SERPL-MCNC: NEGATIVE MG/DL
BLOOD URINE, POC: NEGATIVE
COLOR, POC UA: NORMAL
CTP QC/QA: YES
GLUCOSE UR QL STRIP: NEGATIVE
KETONES UR QL STRIP: NORMAL
LEUKOCYTE ESTERASE URINE, POC: NEGATIVE
NITRITE, POC UA: NEGATIVE
PH, POC UA: 6.5
PROTEIN, POC: NEGATIVE
SPECIFIC GRAVITY, POC UA: 1.02
UROBILINOGEN, POC UA: 1

## 2022-06-21 PROCEDURE — 87491 CHLAMYDIA/GONORRHOEAE(GC), PCR: ICD-10-PCS | Mod: ,,, | Performed by: CLINICAL MEDICAL LABORATORY

## 2022-06-21 PROCEDURE — 1159F PR MEDICATION LIST DOCUMENTED IN MEDICAL RECORD: ICD-10-PCS | Mod: CPTII,,, | Performed by: ADVANCED PRACTICE MIDWIFE

## 2022-06-21 PROCEDURE — 87591 N.GONORRHOEAE DNA AMP PROB: CPT | Mod: ,,, | Performed by: CLINICAL MEDICAL LABORATORY

## 2022-06-21 PROCEDURE — 1159F MED LIST DOCD IN RCRD: CPT | Mod: CPTII,,, | Performed by: ADVANCED PRACTICE MIDWIFE

## 2022-06-21 PROCEDURE — 99214 PR OFFICE/OUTPT VISIT, EST, LEVL IV, 30-39 MIN: ICD-10-PCS | Mod: ,,, | Performed by: ADVANCED PRACTICE MIDWIFE

## 2022-06-21 PROCEDURE — 87591 CHLAMYDIA/GONORRHOEAE(GC), PCR: ICD-10-PCS | Mod: ,,, | Performed by: CLINICAL MEDICAL LABORATORY

## 2022-06-21 PROCEDURE — 81025 URINE PREGNANCY TEST: CPT | Mod: RHCUB | Performed by: ADVANCED PRACTICE MIDWIFE

## 2022-06-21 PROCEDURE — 81003 URINALYSIS AUTO W/O SCOPE: CPT | Mod: RHCUB | Performed by: ADVANCED PRACTICE MIDWIFE

## 2022-06-21 PROCEDURE — 87491 CHLMYD TRACH DNA AMP PROBE: CPT | Mod: ,,, | Performed by: CLINICAL MEDICAL LABORATORY

## 2022-06-21 PROCEDURE — 3008F PR BODY MASS INDEX (BMI) DOCUMENTED: ICD-10-PCS | Mod: CPTII,,, | Performed by: ADVANCED PRACTICE MIDWIFE

## 2022-06-21 PROCEDURE — 3008F BODY MASS INDEX DOCD: CPT | Mod: CPTII,,, | Performed by: ADVANCED PRACTICE MIDWIFE

## 2022-06-21 PROCEDURE — 99214 OFFICE O/P EST MOD 30 MIN: CPT | Mod: ,,, | Performed by: ADVANCED PRACTICE MIDWIFE

## 2022-06-21 RX ORDER — LEVONORGESTREL AND ETHINYL ESTRADIOL 0.1-0.02MG
1 KIT ORAL DAILY
Qty: 28 TABLET | Refills: 5 | Status: SHIPPED | OUTPATIENT
Start: 2022-06-21 | End: 2022-07-11

## 2022-06-21 NOTE — PROGRESS NOTES
Subjective:       Patient ID: Kd Joshua is a 22 y.o. female.    Chief Complaint: endometriosis pain    HPI  Ms Joshua is here because of pelvic pain from endometriosis.  She had surgery in 2017 for the endometriosis and it helped her pain for about two years.   She has regular periods.  LMP was 6/17/22 and they last 4 days with 2-3 heavy days.  She has to go to the ER sometimes when the pain is severe.   She reports pain of varying intensity with intercourse.   She is seen by pain management for chronic back pain.   She did have Falcon rods but those have been removed.  She uses condoms when she has sex.   She takes ibuprofen, tramadol or Lyrica for pain.    She has not had a recent pelvic ultrasound.    Review of Systems   Constitutional: Positive for fatigue.   HENT: Negative.    Respiratory: Negative.    Cardiovascular: Negative.    Gastrointestinal: Positive for abdominal pain.   Endocrine: Negative.    Genitourinary: Positive for dyspareunia and pelvic pain. Negative for dysuria, flank pain and frequency.         Objective:      Physical Exam  Constitutional:       Appearance: She is normal weight.   HENT:      Head: Normocephalic.      Nose: Nose normal.   Eyes:      Extraocular Movements: Extraocular movements intact.   Cardiovascular:      Rate and Rhythm: Normal rate.   Pulmonary:      Effort: Pulmonary effort is normal.   Abdominal:      General: Abdomen is flat. There is no distension.      Palpations: Abdomen is soft. There is no mass.      Tenderness: There is abdominal tenderness.   Skin:     General: Skin is warm and dry.   Neurological:      Mental Status: She is alert and oriented to person, place, and time.   Psychiatric:         Mood and Affect: Mood normal.         Behavior: Behavior normal.         Assessment:       Problem List Items Addressed This Visit    None     Visit Diagnoses     History of endometriosis    -  Primary    Relevant Medications    levonorgestrel-ethinyl estradiol  (RASHMIE,PARESHE,LESSINA) 0.1-20 mg-mcg per tablet    Other Relevant Orders    US Pelvis Complete Non OB    Pelvic pain        Relevant Medications    levonorgestrel-ethinyl estradiol (AVIANE,ALESSE,LESSINA) 0.1-20 mg-mcg per tablet    Other Relevant Orders    POCT URINALYSIS W/O SCOPE (Completed)    POCT URINE PREGNANCY (Completed)    Chlamydia/GC, PCR          Plan:     Schedule pelvic u/s    Start Cornelius, ACHES discussed.    F/u 2 weeks.  To ER for severe abdominal pain.    Will refer to gynecologist after f/u if needed.

## 2022-06-22 ENCOUNTER — TELEPHONE (OUTPATIENT)
Dept: OBSTETRICS AND GYNECOLOGY | Facility: CLINIC | Age: 22
End: 2022-06-22
Payer: MEDICAID

## 2022-06-22 LAB
CHLAMYDIA BY PCR: NEGATIVE
N. GONORRHOEAE (GC) BY PCR: NEGATIVE

## 2022-06-22 NOTE — TELEPHONE ENCOUNTER
----- Message from Annmarie Gutiérrez CNM sent at 6/22/2022  9:51 AM CDT -----  Review with pt.as neg.

## 2022-07-05 ENCOUNTER — TELEPHONE (OUTPATIENT)
Dept: OBSTETRICS AND GYNECOLOGY | Facility: CLINIC | Age: 22
End: 2022-07-05
Payer: MEDICAID

## 2022-07-11 ENCOUNTER — TELEPHONE (OUTPATIENT)
Dept: OBSTETRICS AND GYNECOLOGY | Facility: CLINIC | Age: 22
End: 2022-07-11
Payer: MEDICAID

## 2022-07-11 ENCOUNTER — OFFICE VISIT (OUTPATIENT)
Dept: OBSTETRICS AND GYNECOLOGY | Facility: CLINIC | Age: 22
End: 2022-07-11
Payer: MEDICAID

## 2022-07-11 VITALS
HEART RATE: 126 BPM | DIASTOLIC BLOOD PRESSURE: 83 MMHG | WEIGHT: 126.63 LBS | HEIGHT: 67 IN | BODY MASS INDEX: 19.88 KG/M2 | OXYGEN SATURATION: 98 % | SYSTOLIC BLOOD PRESSURE: 129 MMHG

## 2022-07-11 DIAGNOSIS — R10.2 PELVIC PAIN IN FEMALE: ICD-10-CM

## 2022-07-11 DIAGNOSIS — N80.9 ENDOMETRIOSIS: ICD-10-CM

## 2022-07-11 DIAGNOSIS — Z30.016 ENCOUNTER FOR INITIAL PRESCRIPTION OF TRANSDERMAL PATCH HORMONAL CONTRACEPTIVE DEVICE: Primary | ICD-10-CM

## 2022-07-11 PROCEDURE — 3074F SYST BP LT 130 MM HG: CPT | Mod: CPTII,,, | Performed by: ADVANCED PRACTICE MIDWIFE

## 2022-07-11 PROCEDURE — 1159F MED LIST DOCD IN RCRD: CPT | Mod: CPTII,,, | Performed by: ADVANCED PRACTICE MIDWIFE

## 2022-07-11 PROCEDURE — 3008F PR BODY MASS INDEX (BMI) DOCUMENTED: ICD-10-PCS | Mod: CPTII,,, | Performed by: ADVANCED PRACTICE MIDWIFE

## 2022-07-11 PROCEDURE — 1159F PR MEDICATION LIST DOCUMENTED IN MEDICAL RECORD: ICD-10-PCS | Mod: CPTII,,, | Performed by: ADVANCED PRACTICE MIDWIFE

## 2022-07-11 PROCEDURE — 99213 PR OFFICE/OUTPT VISIT, EST, LEVL III, 20-29 MIN: ICD-10-PCS | Mod: ,,, | Performed by: ADVANCED PRACTICE MIDWIFE

## 2022-07-11 PROCEDURE — 3074F PR MOST RECENT SYSTOLIC BLOOD PRESSURE < 130 MM HG: ICD-10-PCS | Mod: CPTII,,, | Performed by: ADVANCED PRACTICE MIDWIFE

## 2022-07-11 PROCEDURE — 3008F BODY MASS INDEX DOCD: CPT | Mod: CPTII,,, | Performed by: ADVANCED PRACTICE MIDWIFE

## 2022-07-11 PROCEDURE — 3079F PR MOST RECENT DIASTOLIC BLOOD PRESSURE 80-89 MM HG: ICD-10-PCS | Mod: CPTII,,, | Performed by: ADVANCED PRACTICE MIDWIFE

## 2022-07-11 PROCEDURE — 99213 OFFICE O/P EST LOW 20 MIN: CPT | Mod: ,,, | Performed by: ADVANCED PRACTICE MIDWIFE

## 2022-07-11 PROCEDURE — 3079F DIAST BP 80-89 MM HG: CPT | Mod: CPTII,,, | Performed by: ADVANCED PRACTICE MIDWIFE

## 2022-07-11 RX ORDER — NORELGESTROMIN AND ETHINYL ESTRADIOL 35; 150 UG/MG; UG/MG
1 PATCH TRANSDERMAL
Qty: 4 PATCH | Refills: 11 | Status: SHIPPED | OUTPATIENT
Start: 2022-07-11 | End: 2023-03-08 | Stop reason: SDDI

## 2022-07-11 NOTE — PROGRESS NOTES
"Patient ID:  Kd Joshua is a 22 y.o. female      Chief Complaint:   Chief Complaint   Patient presents with    Contraception     Vomiting/nausea        HPI:  Kd is in with c/o OCP causing daily n/v. She desires to stop OCP which were started in  for c/o pelvic pain mostly and heavy menstrual cycles. She was dx 2017 with endometriosis. Pelvic US was scheduled but she did not keep appointment. Discussed contraceptive options including r/b/a implant, IUD, ring, injection, and patches. She states was on injections x 2 years, will try patches. Desires follow up appointment with Dr. Restrepo who dx endometriosis. Will reschedule pelvic US.   LMP: Patient's last menstrual period was 07/10/2022.   Sexually active:  yes  Contraceptive: OCP      Past Medical History:   Diagnosis Date    Celiac disease     Chlamydia infection     Endometriosis     Herpes simplex virus (HSV) infection     Mental disorder     anexity and depression    Ovarian cyst     Scoliosis      Past Surgical History:   Procedure Laterality Date    SPINAL FUSION  10/03/2017    SURGICAL REMOVAL OF ENDOMETRIOSIS      TONSILLECTOMY         OB History        0    Para   0    Term   0       0    AB   0    Living   0       SAB   0    IAB   0    Ectopic   0    Multiple   0    Live Births   0                 /83 (BP Location: Right arm, Patient Position: Sitting, BP Method: Large (Automatic))   Pulse (!) 126   Ht 5' 7" (1.702 m)   Wt 57.4 kg (126 lb 9.6 oz)   LMP 07/10/2022   SpO2 98%   BMI 19.83 kg/m²   Wt Readings from Last 3 Encounters:   22 57.4 kg (126 lb 9.6 oz)   22 60 kg (132 lb 3.2 oz)   22 58.3 kg (128 lb 9.6 oz)          ROS:  Review of Systems   Constitutional: Negative.    Eyes: Negative.    Respiratory: Negative.    Cardiovascular: Negative.    Gastrointestinal: Negative.    Genitourinary: Positive for menstrual problem and pelvic pain.   Musculoskeletal: Negative.  "   Neurological: Negative.    Psychiatric/Behavioral: Negative.           PHYSICAL EXAM:  Physical Exam  Constitutional:       Appearance: Normal appearance. She is normal weight.   Eyes:      Extraocular Movements: Extraocular movements intact.   Cardiovascular:      Rate and Rhythm: Normal rate.      Pulses: Normal pulses.   Pulmonary:      Effort: Pulmonary effort is normal.   Abdominal:      Palpations: Abdomen is soft.   Musculoskeletal:         General: Normal range of motion.      Cervical back: Normal range of motion.   Skin:     General: Skin is warm and dry.   Neurological:      Mental Status: She is alert and oriented to person, place, and time.   Psychiatric:         Mood and Affect: Mood normal.         Behavior: Behavior normal.         Thought Content: Thought content normal.         Judgment: Judgment normal.          Assessment:  Kd was seen today for contraception.    Diagnoses and all orders for this visit:    Encounter for initial prescription of transdermal patch hormonal contraceptive device  -     norelgestromin-ethinyl estradiol (ORTHO EVRA) 150-35 mcg/24 hr; Place 1 patch onto the skin every 7 days.    Pelvic pain in female    Endometriosis          ICD-10-CM ICD-9-CM    1. Encounter for initial prescription of transdermal patch hormonal contraceptive device  Z30.016 V25.02 norelgestromin-ethinyl estradiol (ORTHO EVRA) 150-35 mcg/24 hr   2. Pelvic pain in female  R10.2 625.9    3. Endometriosis  N80.9 617.9        Plan:  RX sent for contraceptive patches, instructed on use, printed info given, reviewed possible s/e  Pelvic US rescheduled  Encouraged use of NSAIDs for pain management    Follow up in about 3 months (around 10/11/2022) for surveillance 3 months.

## 2022-07-18 ENCOUNTER — HOSPITAL ENCOUNTER (OUTPATIENT)
Dept: RADIOLOGY | Facility: HOSPITAL | Age: 22
Discharge: HOME | End: 2022-07-18
Attending: ADVANCED PRACTICE MIDWIFE
Payer: MEDICAID

## 2022-07-18 DIAGNOSIS — Z87.42 HISTORY OF ENDOMETRIOSIS: ICD-10-CM

## 2022-07-18 PROCEDURE — 76856 US EXAM PELVIC COMPLETE: CPT | Mod: TC

## 2022-07-18 PROCEDURE — 76856 US PELVIS COMPLETE NON OB: ICD-10-PCS | Mod: 26,,, | Performed by: RADIOLOGY

## 2022-07-18 PROCEDURE — 76856 US EXAM PELVIC COMPLETE: CPT | Mod: 26,,, | Performed by: RADIOLOGY

## 2022-08-05 ENCOUNTER — TELEPHONE (OUTPATIENT)
Dept: OBSTETRICS AND GYNECOLOGY | Facility: CLINIC | Age: 22
End: 2022-08-05
Payer: MEDICAID

## 2022-08-05 NOTE — TELEPHONE ENCOUNTER
----- Message from Annmarie Gutiérrez CNM sent at 8/5/2022 10:28 AM CDT -----  Review with pt.as normal pelvic u/s

## 2022-09-19 ENCOUNTER — OFFICE VISIT (OUTPATIENT)
Dept: PAIN MEDICINE | Facility: CLINIC | Age: 22
End: 2022-09-19
Payer: MEDICAID

## 2022-09-19 VITALS
HEART RATE: 92 BPM | WEIGHT: 137 LBS | BODY MASS INDEX: 21.5 KG/M2 | SYSTOLIC BLOOD PRESSURE: 92 MMHG | HEIGHT: 67 IN | DIASTOLIC BLOOD PRESSURE: 63 MMHG

## 2022-09-19 DIAGNOSIS — M41.9 SCOLIOSIS OF LUMBAR SPINE, UNSPECIFIED SCOLIOSIS TYPE: ICD-10-CM

## 2022-09-19 DIAGNOSIS — G89.29 CHRONIC BILATERAL LOW BACK PAIN WITHOUT SCIATICA: Primary | ICD-10-CM

## 2022-09-19 DIAGNOSIS — M54.6 THORACIC SPINE PAIN: ICD-10-CM

## 2022-09-19 DIAGNOSIS — M54.50 CHRONIC BILATERAL LOW BACK PAIN WITHOUT SCIATICA: Primary | ICD-10-CM

## 2022-09-19 PROCEDURE — 3008F PR BODY MASS INDEX (BMI) DOCUMENTED: ICD-10-PCS | Mod: CPTII,,, | Performed by: PAIN MEDICINE

## 2022-09-19 PROCEDURE — 1159F PR MEDICATION LIST DOCUMENTED IN MEDICAL RECORD: ICD-10-PCS | Mod: CPTII,,, | Performed by: PAIN MEDICINE

## 2022-09-19 PROCEDURE — 1159F MED LIST DOCD IN RCRD: CPT | Mod: CPTII,,, | Performed by: PAIN MEDICINE

## 2022-09-19 PROCEDURE — 3074F SYST BP LT 130 MM HG: CPT | Mod: CPTII,,, | Performed by: PAIN MEDICINE

## 2022-09-19 PROCEDURE — 99214 OFFICE O/P EST MOD 30 MIN: CPT | Mod: PBBFAC | Performed by: PAIN MEDICINE

## 2022-09-19 PROCEDURE — 3078F DIAST BP <80 MM HG: CPT | Mod: CPTII,,, | Performed by: PAIN MEDICINE

## 2022-09-19 PROCEDURE — 99213 PR OFFICE/OUTPT VISIT, EST, LEVL III, 20-29 MIN: ICD-10-PCS | Mod: S$PBB,,, | Performed by: PAIN MEDICINE

## 2022-09-19 PROCEDURE — 3078F PR MOST RECENT DIASTOLIC BLOOD PRESSURE < 80 MM HG: ICD-10-PCS | Mod: CPTII,,, | Performed by: PAIN MEDICINE

## 2022-09-19 PROCEDURE — 3008F BODY MASS INDEX DOCD: CPT | Mod: CPTII,,, | Performed by: PAIN MEDICINE

## 2022-09-19 PROCEDURE — 99213 OFFICE O/P EST LOW 20 MIN: CPT | Mod: S$PBB,,, | Performed by: PAIN MEDICINE

## 2022-09-19 PROCEDURE — 3074F PR MOST RECENT SYSTOLIC BLOOD PRESSURE < 130 MM HG: ICD-10-PCS | Mod: CPTII,,, | Performed by: PAIN MEDICINE

## 2022-09-19 RX ORDER — GABAPENTIN 100 MG/1
100 CAPSULE ORAL 3 TIMES DAILY
Qty: 90 CAPSULE | Refills: 2 | Status: SHIPPED | OUTPATIENT
Start: 2022-09-19 | End: 2023-03-08 | Stop reason: ALTCHOICE

## 2022-09-19 RX ORDER — CELECOXIB 200 MG/1
200 CAPSULE ORAL DAILY
Qty: 30 CAPSULE | Refills: 1 | Status: SHIPPED | OUTPATIENT
Start: 2022-09-19 | End: 2023-03-08 | Stop reason: SDUPTHER

## 2022-09-19 NOTE — PROGRESS NOTES
She Disclaimer: This note has been generated using voice-recognition software. There may be typographical errors that have been missed during proof-reading        Patient ID: Kd Joshua is a 22 y.o. female.      Chief Complaint: Low-back Pain and Mid-back Pain      22-year-old female returns for re-evaluation of chronic lower back pain,  status post Falcon rods for scoliosis in 2017.  Elavil, Lyrica  and Zanaflex are providing partial pain relief.  She received chiropractor treatment  with some relief.  She has  not been involved in any recent physical therapy.  Gabapentin provided better pain relief than Lyrica. She is  not receiving NSAIDs for chronic lower back pain.          Pain Assessment  Pain Assessment: 0-10  Pain Score:   8  Pain Location: Other (Comment) (mid and lower back)  Pain Descriptors: Aching, Dull, Constant  Pain Frequency: Continuous  Pain Onset: Awakened from sleep  Clinical Progression: Gradually worsening  Aggravating Factors: Standing, Walking, Other (Comment) (sitting and lying)  Pain Intervention(s): Other (Comment) (no relief)      A's of Opioid Risk Assessment  Activity:Patient can perform ADL.   Analgesia:Patients pain is partially controlled by current medication.   Adverse Effects: Patient denies constipation or sedation.  Aberrant Behavior:  reviewed with no aberrant drug seeking/taking behavior.      Patient denies any suicidal or homicidal ideations    Physical Therapy/Home Exercise: yes            Review of Systems   Constitutional: Negative.    HENT: Negative.     Eyes: Negative.    Respiratory: Negative.     Cardiovascular: Negative.    Gastrointestinal: Negative.    Endocrine: Negative.    Genitourinary: Negative.    Musculoskeletal:  Positive for arthralgias, back pain and myalgias.   Integumentary:  Negative.   Neurological: Negative.    Hematological: Negative.    Psychiatric/Behavioral:  Positive for sleep disturbance.            Past Medical History:    Diagnosis Date    Celiac disease     Chlamydia infection     Endometriosis     Herpes simplex virus (HSV) infection     Mental disorder     anexity and depression    Ovarian cyst     Scoliosis      Past Surgical History:   Procedure Laterality Date    SPINAL FUSION  10/03/2017    SURGICAL REMOVAL OF ENDOMETRIOSIS  2014    TONSILLECTOMY  2014     Social History     Socioeconomic History    Marital status: Single   Tobacco Use    Smoking status: Never    Smokeless tobacco: Never   Substance and Sexual Activity    Alcohol use: Never    Drug use: Yes     Types: Marijuana     Comment: every couple of days    Sexual activity: Yes     Partners: Male     Birth control/protection: Condom     Comment: Last sexual intercourse 9/9/2021     Family History   Problem Relation Age of Onset    Lupus Paternal Grandmother     Cancer Maternal Grandmother     Diabetes Maternal Grandfather     Cancer Paternal Aunt     Cancer Maternal Aunt     Cancer Maternal Uncle      Review of patient's allergies indicates:   Allergen Reactions    Codeine     Oxycodone Itching     has a current medication list which includes the following prescription(s): amitriptyline, ibuprofen, norelgestromin-ethinyl estradiol, tizanidine, valacyclovir, celecoxib, gabapentin, ondansetron, pregabalin, and tramadol.      Objective:  Vitals:    09/19/22 1254   BP: 92/63   Pulse: 92        Physical Exam  Vitals and nursing note reviewed.   Constitutional:       General: She is not in acute distress.     Appearance: Normal appearance. She is not ill-appearing, toxic-appearing or diaphoretic.   HENT:      Head: Normocephalic and atraumatic.      Nose: Nose normal.      Mouth/Throat:      Mouth: Mucous membranes are moist.   Eyes:      Extraocular Movements: Extraocular movements intact.      Pupils: Pupils are equal, round, and reactive to light.   Cardiovascular:      Rate and Rhythm: Normal rate and regular rhythm.      Heart sounds: Normal heart sounds.   Pulmonary:       Effort: Pulmonary effort is normal. No respiratory distress.      Breath sounds: Normal breath sounds. No stridor. No wheezing or rhonchi.   Abdominal:      General: Bowel sounds are normal.      Palpations: Abdomen is soft.   Musculoskeletal:         General: No swelling or deformity.      Cervical back: Normal and normal range of motion. No spasms or tenderness. No pain with movement. Normal range of motion.      Thoracic back: Normal.      Lumbar back: Spasms and tenderness present. No bony tenderness. Decreased range of motion. Negative right straight leg raise test and negative left straight leg raise test. No scoliosis.      Right lower leg: No edema.      Left lower leg: No edema.   Skin:     General: Skin is warm.   Neurological:      General: No focal deficit present.      Mental Status: She is alert and oriented to person, place, and time. Mental status is at baseline.      Cranial Nerves: No cranial nerve deficit.      Sensory: Sensation is intact. No sensory deficit.      Motor: No weakness.      Coordination: Coordination normal.      Gait: Gait normal.      Deep Tendon Reflexes: Reflexes are normal and symmetric.   Psychiatric:         Mood and Affect: Mood normal.         Behavior: Behavior normal.         Assessment:      1. Chronic bilateral low back pain without sciatica    2. Scoliosis of lumbar spine, unspecified scoliosis type    3. Thoracic spine pain            Plan:  1. reviewed  2.Addiction, Dependency, Tolerance, Opioid abuse-misuse, Death, Diversion Discussed. Overdose reversal drug Naloxone discussed.  3.Refill/Continue medications for pain control and function.  Discontinue Lyrica start gabapentin 100 mg t.i.d. and Celebrex 200 mg a day       Requested Prescriptions     Signed Prescriptions Disp Refills    gabapentin (NEURONTIN) 100 MG capsule 90 capsule 2     Sig: Take 1 capsule (100 mg total) by mouth 3 (three) times daily.    celecoxib (CELEBREX) 200 MG capsule 30 capsule 1      Sig: Take 1 capsule (200 mg total) by mouth once daily.     4. Start physical therapy for chronic lower back pain  Orders Placed This Encounter   Procedures    Ambulatory referral/consult to Physical/Occupational Therapy     Standing Status:   Future     Standing Expiration Date:   10/19/2023     Referral Priority:   Routine     Referral Type:   Physical Medicine     Referral Reason:   Specialty Services Required     Number of Visits Requested:   1      5. Return in 2 months for re-evaluation     report:  Reviewed and consistent with medication use as prescribed.      The total time spent for evaluation and management on 09/19/2022 including reviewing separately obtained history, performing a medically appropriate exam and evaluation, documenting clinical information in the health record, independently interpreting results and communicating them to the patient/family/caregiver, and ordering medications/tests/procedures was between 15-29 minutes.    The above plan and management options were discussed at length with patient. Patient is in agreement with the above and verbalized understanding. It will be communicated with the referring physician via electronic record, fax, or mail.

## 2022-09-30 ENCOUNTER — PATIENT MESSAGE (OUTPATIENT)
Dept: OBSTETRICS AND GYNECOLOGY | Facility: CLINIC | Age: 22
End: 2022-09-30
Payer: MEDICAID

## 2022-10-04 ENCOUNTER — CLINICAL SUPPORT (OUTPATIENT)
Dept: REHABILITATION | Facility: HOSPITAL | Age: 22
End: 2022-10-04
Payer: MEDICAID

## 2022-10-04 DIAGNOSIS — R52 PAIN: ICD-10-CM

## 2022-10-04 DIAGNOSIS — M54.50 CHRONIC BILATERAL LOW BACK PAIN WITHOUT SCIATICA: Primary | ICD-10-CM

## 2022-10-04 DIAGNOSIS — G89.29 CHRONIC BILATERAL LOW BACK PAIN WITHOUT SCIATICA: Primary | ICD-10-CM

## 2022-10-04 PROCEDURE — 97161 PT EVAL LOW COMPLEX 20 MIN: CPT

## 2022-10-04 NOTE — PLAN OF CARE
[facility]  Physical Therapy Initial Evaluation    Date: 10/4/2022   Name: Kd Joshua  Clinic Number: [id number]    Therapy Diagnosis:   Encounter Diagnoses   Name Primary?    Chronic bilateral low back pain without sciatica Yes    Pain      Physician: Nicole Luciano MD    Physician Orders: PT Eval and Treat   Medical Diagnosis from Referral: Low back pain  Evaluation Date: 10/4/2022  Authorization Period Expiration: 9/19/23  Plan of Care Expiration: 11/6/22  Visit # / Visits authorized: 1/ 24 until 12/29    Time In: 1013am  Time Out: 1045am  Total Appointment Time (timed & untimed codes): 32 minutes    Precautions: Standard    Subjective   Date of onset: Reji removal 10/2021    History of current condition - Kd reports: Prolonged standing/walking/sitting/lying x 1 hour. Relief with heat and shifting positions, ibuprofen and medication. Dr. Ya at [facility] is the MD that did that reji surgery and removal. He states the back did not fuse right. Constant dull ache and occasional sharp shooting. The right arm occasionally feels like dead weight. Difficulty with sleeping secondary to pain. Right handed. Occasional arm tingling but not in the legs. See imaging for scoliosis information. Feels stable with no buckling. Feels like the lower body strength is good but the upper body strength is weak. Occasional will drop items if it weights more than 5lb. Saw chiropractor with no relief. Original fusion with rods 10/2017 then 4 years later the rods were removed through the same incision.   Job duties: [employer]       Medical History:   Past Medical History:   Diagnosis Date    Celiac disease     Chlamydia infection     Endometriosis     Herpes simplex virus (HSV) infection     Mental disorder     anexity and depression    Ovarian cyst     Scoliosis        Surgical History:   Kd Joshua  has a past surgical history that includes Spinal fusion (10/03/2017); Tonsillectomy (2014); and  Surgical removal of endometriosis (2014).    Medications:   Kd has a current medication list which includes the following prescription(s): amitriptyline, celecoxib, gabapentin, ibuprofen, norelgestromin-ethinyl estradiol, ondansetron, pregabalin, tizanidine, tramadol, and valacyclovir.    Allergies:   Review of patient's allergies indicates:   Allergen Reactions    Codeine     Oxycodone Itching        Imaging, bone scan films: FINDINGS:  No fracture is seen. Vertebral body heights are normal.  There is a thoracolumbar scoliosis with the lumbar component apex to the right at L2, Seo angle is estimated 19 degrees appears increased from previous..  The disc space heights are well-maintained.  No significant degenerative change is present.  Impression:  Increased lumbar scoliosis    FINDINGS:  No fracture is seen.  Spinal rods have been removed.  The scoliosis appears unchanged from previous.  Vertebral body heights and alignment are stable.  The disc space heights are well-maintained.  No significant degenerative change is present.  Impression:  Previous spinal rods have been removed.  Scoliosis appears unchanged    Pain:  Current 8/10, worst 10/10,  Location: bilateral back    Description: Aching, Dull, and Throbbing  Aggravating Factors: Sitting, Standing, Laying, Walking, Night Time, and Lifting  Easing Factors: massage, relaxation, pain medication, and rest      Objective     Left rib hump with forward bend  Bilateral shoulder range of motion within functional limits   Increased muscle tone along the thoracic paraspinals left and right scapular region  Significant scapular dyskinsia  Bilateral rounded shoulders with tight pecs  Cervical range of motion within functional limits  Bilateral shoulder light touch intact  Manual muscle test 4+/5 bilateral upper extremities with left tricep 4-/5  Scar from upper to lower thoracic      Limitation/Restriction for FOTO Survey    Therapist reviewed FOTO scores for  Kd Joshua on 10/4/2022.   FOTO documents entered into [employer] - see Media section.    Intake Score: 36%         Home Exercises and Patient Education Provided    Education provided:   - eval findings      Assessment   Kd is a 22 y.o. female referred to outpatient Physical Therapy with a medical diagnosis of thoracic back pain. Patient presents with increased muscle tone along the left thoracic spine and the right scapular border. Patient has a left rib hump with forward bend, rounded shoulders, tight pectorals, and significant scapular dyskinesia and weakness. Patient has normal cervical and shoulder range of motion. Patient had rods in spine in 2017 and had them for 4 years then gilles removal in 2021. Patient has generalized weakness of the upper extremities and tightness throughout the pectorals and cervical musculature. Patient will benefit from skilled physical therapy at this time to address deficits with scapular stabilization, stretching, manual, myofascial release, dry needling and modalities as needed.     Patient prognosis is Fair.   Patient will benefit from skilled outpatient Physical Therapy to address the deficits stated above and in the chart below, provide patient /family education, and to maximize patientt's level of independence.     Plan of care discussed with patient: Yes  Patient's spiritual, cultural and educational needs considered and patient is agreeable to the plan of care and goals as stated below:     Anticipated Barriers for therapy: none     Goals:  Short Term Goals: 4 weeks   Patient will be able to sleep in bed ~ 3 hours without waking from pain  Patient will be able to maintain prolonged positions x 30 minutes with 4/10 pain  Patient will be able to complete all activities of daily living with 4/10 pain    Long Term Goals: 8 weeks   Patient will be able to sleep through the night in bed without waking from pain  Patient will be able to maintain prolonged positions x 1 hour  with 2/10 pain  Patient was able to complete all activities of daily living with 2/10 pain      Additional Information for [employer]     Dates of Services: 10/06/22 to 12/29/22.  Discharge Plan: Patient will be discharged from skilled PT treatment once all goals have been met, patient has plateaued, or physician/insurance requests discontinuation of care. Pt will be discharged with a home exercise program.   Last Face-to-Face Visit with Prescribing Physician: 09/19/22  CPT Codes and Number of Units Requested:  47593 [therapeutic exercise]  Total units: 72  3 units/visit x 24 visits  75272 [neuromuscular re-education]  Total units: 48  2 units/visit x 24 visits  34915 [manual therapy]  Total units: 48  2 units/visit x 24 visits  58906 [ultrasound]  Total units: 24  2 units/visit x 24 visits  61394 [unattended electrical stimulation]  Total units: 24  2 units/visit x 24 visits  20547 [mechanical traction]  Total units: 24  2 units/visit x 24 visits  73329 [iontophoresis]  Total units: 24  2 units/visit x 24 visits     Plan   Plan of care Certification: 10/4/2022 to 11/6/22.    Outpatient Physical Therapy 2 times weekly for 12 weeks to include the following interventions: Aquatic Therapy, Cervical/Lumbar Traction, Electrical Stimulation IFC/Premod, Gait Training, Iontophoresis (with Dex), Manual Therapy, Moist Heat/ Ice, Neuromuscular Re-ed, Patient Education, Therapeutic Exercise, Ultrasound, and Dry Needling .     GARRY GRULLON, PT        I CERTIFY THE NEED FOR THESE SERVICES FURNISHED UNDER THIS PLAN OF TREATMENT AND WHILE UNDER MY CARE.    Physician's comments:      Physician's Signature: ____________________________________________Date________________        Please fax this MD signed form to:  [facility]  [phone] fax

## 2022-10-18 ENCOUNTER — CLINICAL SUPPORT (OUTPATIENT)
Dept: REHABILITATION | Facility: HOSPITAL | Age: 22
End: 2022-10-18
Payer: MEDICAID

## 2022-10-18 DIAGNOSIS — M54.50 CHRONIC BILATERAL LOW BACK PAIN WITHOUT SCIATICA: Primary | ICD-10-CM

## 2022-10-18 DIAGNOSIS — G89.29 CHRONIC BILATERAL LOW BACK PAIN WITHOUT SCIATICA: Primary | ICD-10-CM

## 2022-10-18 PROCEDURE — 97112 NEUROMUSCULAR REEDUCATION: CPT | Mod: CQ

## 2022-10-18 PROCEDURE — 97110 THERAPEUTIC EXERCISES: CPT | Mod: CQ

## 2022-10-18 NOTE — PROGRESS NOTES
[facility]   Physical Therapy Treatment Note     Name: Kd Joshua  Clinic Number: [id number]    Therapy Diagnosis: No diagnosis found.  Physician: Nicole Luciano MD    Visit Date: 10/18/2022    Physician Orders: PT Eval and Treat   Medical Diagnosis from Referral: Low back pain  Evaluation Date: 10/4/2022  Authorization Period Expiration: 9/19/23  Plan of Care Expiration: 11/6/22  Visit # / Visits authorized: 2/ 24 until 12/29       PTA Visit #: 1/5     Time In: 2:00 pm  Time Out: 2:30 pm  Total Billable Time: 30 minutes    SUBJECTIVE     Pt reports: pain is about a 8/10.  She was compliant with home exercise program.  Response to previous treatment: first treatment after evaluation  Functional change: none    Pain: 8/10  Location: bilateral back      OBJECTIVE     Objective Measures updated at progress report unless specified.     Treatment     Kd received the treatments listed below:      therapeutic exercises to develop strength, ROM, posture, and core stabilization for 15 minutes including:  Bike 5 minutes  Corner stretch 4 x 20   Cybex chest press   Ball roll out 5 x 5 seconds    neuromuscular re-education activities to improve: Coordination, Proprioception, and Posture for 15 minutes. The following activities were included:  Shoulder retraction and extension  3 x 10 green TB  Cybex rows close and wide 2 x 10 3#  Shoulder horizontal abduction 2 x 10 red TB  Shoulder bilateral external rotation 2 x 10 red TB      supervised modalities after being cleared for contradictions: TENS:  Kd received TENS electrical stimulation for pain to the lower back. Pt received continuous mode for N/A minutes. Kd tolerated treatment well without any adverse effects.           Patient Education and Home Exercises     Home Exercises Provided and Patient Education Provided     Education provided:   - add more next vivit    Written Home Exercises Provided: Patient instructed to  cont prior HEP. Exercises were reviewed and Kd was able to demonstrate them prior to the end of the session.  Kd demonstrated good  understanding of the education provided. See EMR under Patient Instructions for exercises provided during therapy sessions    ASSESSMENT     Case conference with Sena suarez DPT. Patient arrived to therapy 15 minutes late and treatment was modified. Patient states that she had increased pain in upper back thoracic area. Will continue with current plan of care.     Kd Is progressing towards her goals.   Pt prognosis is Good.     Pt will continue to benefit from skilled outpatient physical therapy to address the deficits listed in the problem list box on initial evaluation, provide pt/family education and to maximize pt's level of independence in the home and community environment.     Pt's spiritual, cultural and educational needs considered and pt agreeable to plan of care and goals.     Anticipated barriers to physical therapy: none    Goals: Short Term Goals: 4 weeks   Patient will be able to sleep in bed ~ 3 hours without waking from pain  Patient will be able to maintain prolonged positions x 30 minutes with 4/10 pain  Patient will be able to complete all activities of daily living with 4/10 pain     Long Term Goals: 8 weeks   Patient will be able to sleep through the night in bed without waking from pain  Patient will be able to maintain prolonged positions x 1 hour with 2/10 pain  Patient was able to complete all activities of daily living with 2/10 pain       PLAN   Plan of care Certification: 10/4/2022 to 11/6/22.     Outpatient Physical Therapy 2 times weekly for 12 weeks to include the following interventions: Aquatic Therapy, Cervical/Lumbar Traction, Electrical Stimulation IFC/Premod, Gait Training, Iontophoresis (with Dex), Manual Therapy, Moist Heat/ Ice, Neuromuscular Re-ed, Patient Education, Therapeutic Exercise, Ultrasound, and Dry Needling .        Cristiana Pereira, PTA   10/18/2022

## 2022-10-20 ENCOUNTER — CLINICAL SUPPORT (OUTPATIENT)
Dept: REHABILITATION | Facility: HOSPITAL | Age: 22
End: 2022-10-20
Payer: MEDICAID

## 2022-10-20 DIAGNOSIS — R52 PAIN: Primary | ICD-10-CM

## 2022-10-20 PROCEDURE — 97110 THERAPEUTIC EXERCISES: CPT

## 2022-10-20 PROCEDURE — 97112 NEUROMUSCULAR REEDUCATION: CPT

## 2022-10-20 NOTE — PROGRESS NOTES
[facility]   Physical Therapy Treatment Note     Name: Kd Joshua  Clinic Number: [id number]    Therapy Diagnosis:   Encounter Diagnosis   Name Primary?    Pain Yes     Physician: Nicole Luciano MD    Visit Date: 10/20/2022    Physician Orders: PT Eval and Treat   Medical Diagnosis from Referral: Low back pain  Evaluation Date: 10/4/2022  Authorization Period Expiration: 9/19/23  Plan of Care Expiration: 11/6/22  Visit # / Visits authorized: 3/ 24 until 12/29       PTA Visit #: 1/5     Time In: 1055am  Time Out: 1147am  Total Billable Time: 52 minutes    SUBJECTIVE     Pt reports: pain is about an 8/10. Went to chiropractor earlier today and feel better. Dont feel as stiff but the pain is still high.     She was compliant with home exercise program.  Response to previous treatment: first treatment after evaluation  Functional change: none    Pain: 8/10  Location: bilateral back      OBJECTIVE     Objective Measures updated at progress report unless specified.     Treatment     Kd received the treatments listed below:      therapeutic exercises to develop strength, ROM, posture, and core stabilization for 30 minutes including:  UBE x 5 minutes  Corner stretch 4 x 20 (not today)  Cybex hip abduction 2pl x 20  Cybex hip extension 3pl x 20  Cybex hip flexion 3pl x 20  Ball roll out 5 x 5 seconds  Cybex back extension 3pl x 20    neuromuscular re-education activities to improve: Coordination, Proprioception, and Posture for 15 minutes. The following activities were included:  Shoulder retraction and extension  3 x 10 blue TB  Cybex rows close and wide 2 x 10 3#  Shoulder horizontal abduction 2 x 10 red TB  Shoulder bilateral external rotation 2 x 10 red TB    (Not today)  supervised modalities after being cleared for contradictions: TENS:  Kd received TENS electrical stimulation for pain to the lower back. Pt received continuous mode for N/A minutes. Kd tolerated  treatment well without any adverse effects.           Patient Education and Home Exercises     Home Exercises Provided and Patient Education Provided     Education provided:   - add more next visit    Written Home Exercises Provided: Patient instructed to cont prior HEP. Exercises were reviewed and Kd was able to demonstrate them prior to the end of the session.  Kd demonstrated good  understanding of the education provided. See EMR under Patient Instructions for exercises provided during therapy sessions    ASSESSMENT     Patient completed scapular stabilization exercises. Fatigued with exercises. Required minimal cueing. Progress as able.     Kd Is progressing towards her goals.   Pt prognosis is Good.     Pt will continue to benefit from skilled outpatient physical therapy to address the deficits listed in the problem list box on initial evaluation, provide pt/family education and to maximize pt's level of independence in the home and community environment.     Pt's spiritual, cultural and educational needs considered and pt agreeable to plan of care and goals.     Anticipated barriers to physical therapy: none    Goals: Short Term Goals: 4 weeks   Patient will be able to sleep in bed ~ 3 hours without waking from pain  Patient will be able to maintain prolonged positions x 30 minutes with 4/10 pain  Patient will be able to complete all activities of daily living with 4/10 pain     Long Term Goals: 8 weeks   Patient will be able to sleep through the night in bed without waking from pain  Patient will be able to maintain prolonged positions x 1 hour with 2/10 pain  Patient was able to complete all activities of daily living with 2/10 pain       PLAN   Plan of care Certification: 10/4/2022 to 11/6/22.     Outpatient Physical Therapy 2 times weekly for 12 weeks to include the following interventions: Aquatic Therapy, Cervical/Lumbar Traction, Electrical Stimulation IFC/Premod, Gait Training,  Iontophoresis (with Dex), Manual Therapy, Moist Heat/ Ice, Neuromuscular Re-ed, Patient Education, Therapeutic Exercise, Ultrasound, and Dry Needling .       GARRY GRULLON, PT   10/20/2022

## 2022-10-25 ENCOUNTER — CLINICAL SUPPORT (OUTPATIENT)
Dept: REHABILITATION | Facility: HOSPITAL | Age: 22
End: 2022-10-25
Payer: MEDICAID

## 2022-10-25 DIAGNOSIS — R52 PAIN: Primary | ICD-10-CM

## 2022-10-25 PROCEDURE — 97110 THERAPEUTIC EXERCISES: CPT | Mod: CQ

## 2022-10-25 NOTE — PROGRESS NOTES
[facility]   Physical Therapy Treatment Note     Name: Kd Joshua  Clinic Number: [id number]    Therapy Diagnosis:   Encounter Diagnosis   Name Primary?    Pain Yes     Physician: Nicole Luciano MD    Visit Date: 10/25/2022    Physician Orders: PT Eval and Treat   Medical Diagnosis from Referral: Low back pain  Evaluation Date: 10/4/2022  Authorization Period Expiration: 9/19/23  Plan of Care Expiration: 11/6/22  Visit # / Visits authorized: 3/ 24 until 12/29       PTA Visit #: 1/5     Time In: 0913  Time Out: 1000  Total Billable Time: 47 minutes    SUBJECTIVE     Pt reports: seeing chiropractor and it is helpful , pain is mostly above and below the scar    She was compliant with home exercise program.  Response to previous treatment: first treatment after evaluation  Functional change: none    Pain: 10/10, 7/10   Location: shoulder blade area,  lumbar    OBJECTIVE     Objective Measures updated at progress report unless specified.     Treatment     Kd received the treatments listed below:      therapeutic exercises to develop strength, ROM, posture, and core stabilization for 30 minutes including:  nustep x 5 minutes  Incline board/ hang out stretch x 1 min  Wall slides x 5 with 10 sec hold  Corner stretch 4 x 20 (not today)  Cybex hip abduction 2pl x 20  Cybex hip extension 3pl x 20  Cybex hip flexion 3pl x 20  Ball roll out 5 x 5 seconds each direction  Cybex back extension 3pl x 20  Cybex rows #4 x 15 each   Lat pulls #2 x 20 with 3 sec hold    neuromuscular re-education activities to improve: Coordination, Proprioception, and Posture for -- minutes. The following activities were included:  Shoulder retraction and extension  3 x 10 blue TB  Cybex rows close and wide 2 x 10 3#  Shoulder horizontal abduction 2 x 10 red TB  Shoulder bilateral external rotation 2 x 10 red TB    (Not today)  supervised modalities after being cleared for contradictions: TENS:   Kd received TENS electrical stimulation for pain to the lower back. Pt received continuous mode for N/A minutes. Kd tolerated treatment well without any adverse effects.           Patient Education and Home Exercises     Home Exercises Provided and Patient Education Provided     Education provided:   - add more next visit    Written Home Exercises Provided: Patient instructed to cont prior HEP. Exercises were reviewed and Kd was able to demonstrate them prior to the end of the session.  Kd demonstrated good  understanding of the education provided. See EMR under Patient Instructions for exercises provided during therapy sessions    ASSESSMENT     Case conference with Sena Summers PT for initial PTA visit. Pt with fair tolerance to strengthening ex. Refused modalities for pain at end of session.    Kd Is progressing towards her goals.   Pt prognosis is Good.     Pt will continue to benefit from skilled outpatient physical therapy to address the deficits listed in the problem list box on initial evaluation, provide pt/family education and to maximize pt's level of independence in the home and community environment.     Pt's spiritual, cultural and educational needs considered and pt agreeable to plan of care and goals.     Anticipated barriers to physical therapy: none    Goals: Short Term Goals: 4 weeks   Patient will be able to sleep in bed ~ 3 hours without waking from pain  Patient will be able to maintain prolonged positions x 30 minutes with 4/10 pain  Patient will be able to complete all activities of daily living with 4/10 pain     Long Term Goals: 8 weeks   Patient will be able to sleep through the night in bed without waking from pain  Patient will be able to maintain prolonged positions x 1 hour with 2/10 pain  Patient was able to complete all activities of daily living with 2/10 pain       PLAN   Plan of care Certification: 10/4/2022 to 11/6/22.     Outpatient Physical  Therapy 2 times weekly for 12 weeks to include the following interventions: Aquatic Therapy, Cervical/Lumbar Traction, Electrical Stimulation IFC/Premod, Gait Training, Iontophoresis (with Dex), Manual Therapy, Moist Heat/ Ice, Neuromuscular Re-ed, Patient Education, Therapeutic Exercise, Ultrasound, and Dry Needling .       Rosalba Hillman, PTA   10/25/2022

## 2022-11-03 ENCOUNTER — CLINICAL SUPPORT (OUTPATIENT)
Dept: REHABILITATION | Facility: HOSPITAL | Age: 22
End: 2022-11-03
Payer: MEDICAID

## 2022-11-03 DIAGNOSIS — R52 PAIN: Primary | ICD-10-CM

## 2022-11-03 PROCEDURE — 97140 MANUAL THERAPY 1/> REGIONS: CPT | Mod: CQ

## 2022-11-03 PROCEDURE — 97110 THERAPEUTIC EXERCISES: CPT | Mod: CQ

## 2022-11-03 NOTE — PROGRESS NOTES
[facility]   Physical Therapy Treatment Note     Name: dK Joshua  Clinic Number: [id number]    Therapy Diagnosis:   Encounter Diagnosis   Name Primary?    Pain Yes     Physician: Nicole Luciano MD    Visit Date: 11/3/2022    Physician Orders: PT Eval and Treat   Medical Diagnosis from Referral: Low back pain  Evaluation Date: 10/4/2022  Authorization Period Expiration: 9/19/23  Plan of Care Expiration: 11/6/22  Visit # / Visits authorized: 4/ 24 until 12/29       PTA Visit #: 2/5     Time In: 0905  Time Out: 1000  Total Billable Time: 55 minutes    SUBJECTIVE     Pt reports:not sleeping well cause of pain, hurting a lot along the scar, maybe colder weather causing increased pain    She was compliant with home exercise program.  Response to previous treatment: no muscle soreness  Functional change: none    Pain: 10/10, 7/10   Location: shoulder blade area,  lumbar    OBJECTIVE     Objective Measures updated at progress report unless specified.     Treatment     Kd received the treatments listed below:      therapeutic exercises to develop strength, ROM, posture, and core stabilization for 30 minutes including:  nustep x 5 minutes  Incline board/ hang out stretch x 1 min  Wall slides x 5 with 10 sec hold  Corner stretch 4 x 20 (not today)  Cybex hip abduction 2pl x 20  Cybex hip extension 3pl x 20  Cybex hip flexion 3pl x 20  Ball roll out 5 x 5 seconds each direction  Cybex back extension 3pl x 20  Lat pulls #2 x 20 with 3 sec hold forward / backwards   Cable tower forward lat pull downs #4 x 20      neuromuscular re-education activities to improve: Coordination, Proprioception, and Posture for -- minutes. The following activities were included:  Shoulder retraction and extension  3 x 10 blue TB  Cybex rows close and wide  each  x 10 3#  Shoulder horizontal abduction 2 x 10 red TB  Shoulder bilateral external rotation 2 x 10 green TB    (Not  today)  supervised modalities after being cleared for contradictions: TENS:  Kd received TENS electrical stimulation for pain to the lower back. Pt received continuous mode for N/A minutes. Kd tolerated treatment well without any adverse effects.     Manual therapy x 15 min with MFR to paraspinals and UT, levator scapula      Patient Education and Home Exercises     Home Exercises Provided and Patient Education Provided     Education provided:   - add more next visit    Written Home Exercises Provided: Patient instructed to cont prior HEP. Exercises were reviewed and Kd was able to demonstrate them prior to the end of the session.  Kd demonstrated good  understanding of the education provided. See EMR under Patient Instructions for exercises provided during therapy sessions    ASSESSMENT      Pt with fair tolerance to strengthening ex. MF tightness in paraspinals and UT    Kd Is progressing towards her goals.   Pt prognosis is Good.     Pt will continue to benefit from skilled outpatient physical therapy to address the deficits listed in the problem list box on initial evaluation, provide pt/family education and to maximize pt's level of independence in the home and community environment.     Pt's spiritual, cultural and educational needs considered and pt agreeable to plan of care and goals.     Anticipated barriers to physical therapy: none    Goals: Short Term Goals: 4 weeks   Patient will be able to sleep in bed ~ 3 hours without waking from pain  Patient will be able to maintain prolonged positions x 30 minutes with 4/10 pain  Patient will be able to complete all activities of daily living with 4/10 pain     Long Term Goals: 8 weeks   Patient will be able to sleep through the night in bed without waking from pain  Patient will be able to maintain prolonged positions x 1 hour with 2/10 pain  Patient was able to complete all activities of daily living with 2/10 pain       PLAN   Plan  of care Certification: 10/4/2022 to 11/6/22.     Outpatient Physical Therapy 2 times weekly for 12 weeks to include the following interventions: Aquatic Therapy, Cervical/Lumbar Traction, Electrical Stimulation IFC/Premod, Gait Training, Iontophoresis (with Dex), Manual Therapy, Moist Heat/ Ice, Neuromuscular Re-ed, Patient Education, Therapeutic Exercise, Ultrasound, and Dry Needling .       Rosalba Hillman, PTA   11/03/2022

## 2022-11-05 ENCOUNTER — OFFICE VISIT (OUTPATIENT)
Dept: FAMILY MEDICINE | Facility: CLINIC | Age: 22
End: 2022-11-05
Payer: MEDICAID

## 2022-11-05 VITALS
TEMPERATURE: 98 F | WEIGHT: 131 LBS | HEIGHT: 66 IN | SYSTOLIC BLOOD PRESSURE: 106 MMHG | HEART RATE: 94 BPM | DIASTOLIC BLOOD PRESSURE: 75 MMHG | BODY MASS INDEX: 21.05 KG/M2 | RESPIRATION RATE: 20 BRPM | OXYGEN SATURATION: 99 %

## 2022-11-05 DIAGNOSIS — G89.29 CHRONIC BILATERAL THORACIC BACK PAIN: Primary | ICD-10-CM

## 2022-11-05 DIAGNOSIS — M54.6 CHRONIC BILATERAL THORACIC BACK PAIN: Primary | ICD-10-CM

## 2022-11-05 PROCEDURE — 3074F PR MOST RECENT SYSTOLIC BLOOD PRESSURE < 130 MM HG: ICD-10-PCS | Mod: CPTII,,, | Performed by: FAMILY MEDICINE

## 2022-11-05 PROCEDURE — 99213 OFFICE O/P EST LOW 20 MIN: CPT | Mod: 25,,, | Performed by: FAMILY MEDICINE

## 2022-11-05 PROCEDURE — 3008F PR BODY MASS INDEX (BMI) DOCUMENTED: ICD-10-PCS | Mod: CPTII,,, | Performed by: FAMILY MEDICINE

## 2022-11-05 PROCEDURE — 99213 PR OFFICE/OUTPT VISIT, EST, LEVL III, 20-29 MIN: ICD-10-PCS | Mod: 25,,, | Performed by: FAMILY MEDICINE

## 2022-11-05 PROCEDURE — 3078F DIAST BP <80 MM HG: CPT | Mod: CPTII,,, | Performed by: FAMILY MEDICINE

## 2022-11-05 PROCEDURE — 3008F BODY MASS INDEX DOCD: CPT | Mod: CPTII,,, | Performed by: FAMILY MEDICINE

## 2022-11-05 PROCEDURE — 96372 PR INJECTION,THERAP/PROPH/DIAG2ST, IM OR SUBCUT: ICD-10-PCS | Mod: ,,, | Performed by: FAMILY MEDICINE

## 2022-11-05 PROCEDURE — 3074F SYST BP LT 130 MM HG: CPT | Mod: CPTII,,, | Performed by: FAMILY MEDICINE

## 2022-11-05 PROCEDURE — 3078F PR MOST RECENT DIASTOLIC BLOOD PRESSURE < 80 MM HG: ICD-10-PCS | Mod: CPTII,,, | Performed by: FAMILY MEDICINE

## 2022-11-05 PROCEDURE — 96372 THER/PROPH/DIAG INJ SC/IM: CPT | Mod: ,,, | Performed by: FAMILY MEDICINE

## 2022-11-05 PROCEDURE — 1159F PR MEDICATION LIST DOCUMENTED IN MEDICAL RECORD: ICD-10-PCS | Mod: CPTII,,, | Performed by: FAMILY MEDICINE

## 2022-11-05 PROCEDURE — 1159F MED LIST DOCD IN RCRD: CPT | Mod: CPTII,,, | Performed by: FAMILY MEDICINE

## 2022-11-05 RX ORDER — IPRATROPIUM BROMIDE 42 UG/1
SPRAY, METERED NASAL
COMMUNITY
Start: 2022-08-01

## 2022-11-05 RX ORDER — DEXAMETHASONE SODIUM PHOSPHATE 4 MG/ML
6 INJECTION, SOLUTION INTRA-ARTICULAR; INTRALESIONAL; INTRAMUSCULAR; INTRAVENOUS; SOFT TISSUE
Status: COMPLETED | OUTPATIENT
Start: 2022-11-05 | End: 2022-11-05

## 2022-11-05 RX ORDER — FLUTICASONE PROPIONATE 50 MCG
SPRAY, SUSPENSION (ML) NASAL
COMMUNITY
Start: 2022-09-18

## 2022-11-05 RX ORDER — PREDNISONE 20 MG/1
TABLET ORAL
Qty: 10 TABLET | Refills: 0 | Status: SHIPPED | OUTPATIENT
Start: 2022-11-05 | End: 2023-03-08 | Stop reason: ALTCHOICE

## 2022-11-05 RX ADMIN — DEXAMETHASONE SODIUM PHOSPHATE 6 MG: 4 INJECTION, SOLUTION INTRA-ARTICULAR; INTRALESIONAL; INTRAMUSCULAR; INTRAVENOUS; SOFT TISSUE at 06:11

## 2022-11-05 NOTE — LETTER
November 5, 2022      [facility]  [address] [address]  Phone: [phone]  Fax: [phone]       Patient: Kd Joshua   YOB: 2000  Date of Visit: 11/05/2022    To Whom It May Concern:    Edgardo Joshua  was at [facility] on 11/05/2022. The patient may return to work/school on 11/06/2022 with no restrictions. If you have any questions or concerns, or if I can be of further assistance, please do not hesitate to contact me.    Sincerely,    Dr. Kenny Blackburn

## 2022-11-05 NOTE — PROGRESS NOTES
Subjective:       Patient ID: Kd Joshua is a 22 y.o. female.    Chief Complaint: Back Pain (Pain in mid-back area)    Back pain is chronic.  History of scoliosis.  She had pins in her back but had some of them removed last year.  She has already tried NSAID and muscle relaxants.  She would like a steroid shot.    Back Pain    Review of Systems   Musculoskeletal:  Positive for back pain.       Objective:      Physical Exam  Constitutional:       General: She is in acute distress.      Appearance: She is not ill-appearing.   Cardiovascular:      Rate and Rhythm: Normal rate and regular rhythm.   Musculoskeletal:         General: Tenderness (diffuse tenderness and muscle spasm of mid back.) present.   Skin:     Findings: Lesion (well-healed midline scar cover room entire thoracic spine.) present. No rash.   Neurological:      Mental Status: She is alert.       Assessment:       Problem List Items Addressed This Visit    None  Visit Diagnoses       Chronic bilateral thoracic back pain    -  Primary            Plan:       Decadron.  Prednisone

## 2022-11-09 ENCOUNTER — CLINICAL SUPPORT (OUTPATIENT)
Dept: REHABILITATION | Facility: HOSPITAL | Age: 22
End: 2022-11-09
Payer: MEDICAID

## 2022-11-09 DIAGNOSIS — R52 PAIN: Primary | ICD-10-CM

## 2022-11-09 PROCEDURE — 97110 THERAPEUTIC EXERCISES: CPT | Mod: CQ

## 2022-11-09 PROCEDURE — 97112 NEUROMUSCULAR REEDUCATION: CPT | Mod: CQ

## 2022-11-09 NOTE — PROGRESS NOTES
[facility]   Physical Therapy Discharge Summary    Name: Kd Joshua  Clinic Number: [id number]    Therapy Diagnosis:   Encounter Diagnosis   Name Primary?    Pain Yes     Physician: Nicole Luciano MD    Visit Date: 11/9/2022    Physician Orders: PT Eval and Treat   Medical Diagnosis from Referral: Low back pain  Evaluation Date: 10/4/2022  Authorization Period Expiration: 9/19/23  Plan of Care Expiration: 11/6/22    Visit # / Visits authorized: 5 / 24 until 12/29  PTA Visit #: 3/5     Time In: 8:41 am  Time Out: 9:26 am  Total Billable Time: 45 minutes    SUBJECTIVE     Pt reports:8/10 pain in thoracic spine, L shoulder blade, bilateral ribs    She was compliant with home exercise program.  Response to previous treatment: no muscle soreness  Functional change: none    Pain: 8/10,    Location: shoulder blade area,  lumbar    OBJECTIVE     Objective Measures updated at progress report unless specified.     Treatment     Kd received the treatments listed below:      therapeutic exercises to develop strength, ROM, posture, and core stabilization for 30 minutes including:  Bike x 5 minutes  Incline board/ hang out stretch x 1 min  Wall slides x 5 with 10 sec hold  Corner stretch 4 x 20 (not today)  Cybex hip abduction 2pl x 20  Cybex hip extension 3pl x 20  Cybex hip flexion 3pl x 20  Ball roll out 10 x 5 seconds each direction  Cybex back extension 3pl x 20  Lat pulls #2 x 20 with 3 sec hold forward / backwards   Cable tower forward lat pull downs #4 x 20  Thoracic Rotation 15x ea  Thoracic Extension over Bolster - 20x  Prone Swimmer with Shoulder Ext - 20x  Prone Bird Dogs - 10x each  Seated Swiss Ball Isometrics - 3-way 20x3 seconds each      neuromuscular re-education activities to improve: Coordination, Proprioception, and Posture for 10 minutes. The following activities were included:  Shoulder retraction and extension  3 x 10 blue TB  Cybex rows close and wide   each  x 20 4#  Shoulder horizontal abduction 2 x 10 red TB  Shoulder bilateral external rotation 2 x 10 green TB  Palloff Press (B) 20x ea blue band    (Not today)  supervised modalities after being cleared for contradictions: TENS:  Kd received TENS electrical stimulation for pain to the lower back. Pt received continuous mode for N/A minutes. Kd tolerated treatment well without any adverse effects.     Manual therapy x 0 min with MFR to paraspinals and UT, levator scapula      Patient Education and Home Exercises     Home Exercises Provided and Patient Education Provided     Education provided:   - add more next visit    Written Home Exercises Provided: Patient instructed to cont prior HEP. Exercises were reviewed and Kd was able to demonstrate them prior to the end of the session.  Kd demonstrated good  understanding of the education provided. See EMR under Patient Instructions for exercises provided during therapy sessions    ASSESSMENT     Pt tolerated all therapeutic exercises/postural exercises today with fatigue and soreness noted. Pt has limited thoracic rotation with more pain going L>R. Progressed strengthening exercises with fatigue noted. Educated pt to keep up with her HEP.     Kd Is progressing towards her goals.   Pt prognosis is Good.     Pt will continue to benefit from skilled outpatient physical therapy to address the deficits listed in the problem list box on initial evaluation, provide pt/family education and to maximize pt's level of independence in the home and community environment.     Pt's spiritual, cultural and educational needs considered and pt agreeable to plan of care and goals.     Anticipated barriers to physical therapy: none    Goals: Short Term Goals: 4 weeks   Patient will be able to sleep in bed ~ 3 hours without waking from pain  Patient will be able to maintain prolonged positions x 30 minutes with 4/10 pain  Patient will be able to complete  all activities of daily living with 4/10 pain     Long Term Goals: 8 weeks   Patient will be able to sleep through the night in bed without waking from pain  Patient will be able to maintain prolonged positions x 1 hour with 2/10 pain  Patient was able to complete all activities of daily living with 2/10 pain       PLAN   Plan of care Certification: 10/4/2022 to 11/6/22.     Outpatient Physical Therapy 2 times weekly for 12 weeks to include the following interventions: Aquatic Therapy, Cervical/Lumbar Traction, Electrical Stimulation IFC/Premod, Gait Training, Iontophoresis (with Dex), Manual Therapy, Moist Heat/ Ice, Neuromuscular Re-ed, Patient Education, Therapeutic Exercise, Ultrasound, and Dry Needling .       Joe Hensley, PTA   11/09/2022       Patient did not return after session. Reason Unknown. Please consider this note a discharge from physical therapy. Thank you for this referral!  Sena Summers DPT, MTC

## 2022-12-14 ENCOUNTER — OFFICE VISIT (OUTPATIENT)
Dept: PAIN MEDICINE | Facility: CLINIC | Age: 22
End: 2022-12-14
Payer: MEDICAID

## 2022-12-14 VITALS
BODY MASS INDEX: 20.4 KG/M2 | DIASTOLIC BLOOD PRESSURE: 62 MMHG | HEIGHT: 67 IN | HEART RATE: 86 BPM | SYSTOLIC BLOOD PRESSURE: 108 MMHG | WEIGHT: 130 LBS

## 2022-12-14 DIAGNOSIS — M54.6 THORACIC SPINE PAIN: Chronic | ICD-10-CM

## 2022-12-14 DIAGNOSIS — M54.50 CHRONIC BILATERAL LOW BACK PAIN WITHOUT SCIATICA: Primary | Chronic | ICD-10-CM

## 2022-12-14 DIAGNOSIS — G89.29 CHRONIC BILATERAL LOW BACK PAIN WITHOUT SCIATICA: Primary | Chronic | ICD-10-CM

## 2022-12-14 DIAGNOSIS — M41.9 SCOLIOSIS OF LUMBAR SPINE, UNSPECIFIED SCOLIOSIS TYPE: Chronic | ICD-10-CM

## 2022-12-14 PROCEDURE — 3074F SYST BP LT 130 MM HG: CPT | Mod: CPTII,,, | Performed by: PAIN MEDICINE

## 2022-12-14 PROCEDURE — 1159F PR MEDICATION LIST DOCUMENTED IN MEDICAL RECORD: ICD-10-PCS | Mod: CPTII,,, | Performed by: PAIN MEDICINE

## 2022-12-14 PROCEDURE — 99214 OFFICE O/P EST MOD 30 MIN: CPT | Mod: S$PBB,,, | Performed by: PAIN MEDICINE

## 2022-12-14 PROCEDURE — 3078F DIAST BP <80 MM HG: CPT | Mod: CPTII,,, | Performed by: PAIN MEDICINE

## 2022-12-14 PROCEDURE — 1159F MED LIST DOCD IN RCRD: CPT | Mod: CPTII,,, | Performed by: PAIN MEDICINE

## 2022-12-14 PROCEDURE — 3008F BODY MASS INDEX DOCD: CPT | Mod: CPTII,,, | Performed by: PAIN MEDICINE

## 2022-12-14 PROCEDURE — 3074F PR MOST RECENT SYSTOLIC BLOOD PRESSURE < 130 MM HG: ICD-10-PCS | Mod: CPTII,,, | Performed by: PAIN MEDICINE

## 2022-12-14 PROCEDURE — 99214 PR OFFICE/OUTPT VISIT, EST, LEVL IV, 30-39 MIN: ICD-10-PCS | Mod: S$PBB,,, | Performed by: PAIN MEDICINE

## 2022-12-14 PROCEDURE — 3078F PR MOST RECENT DIASTOLIC BLOOD PRESSURE < 80 MM HG: ICD-10-PCS | Mod: CPTII,,, | Performed by: PAIN MEDICINE

## 2022-12-14 PROCEDURE — 99214 OFFICE O/P EST MOD 30 MIN: CPT | Mod: PBBFAC | Performed by: PAIN MEDICINE

## 2022-12-14 PROCEDURE — 3008F PR BODY MASS INDEX (BMI) DOCUMENTED: ICD-10-PCS | Mod: CPTII,,, | Performed by: PAIN MEDICINE

## 2022-12-14 RX ORDER — CYCLOBENZAPRINE HCL 10 MG
10 TABLET ORAL 3 TIMES DAILY PRN
Qty: 90 TABLET | Refills: 1 | Status: SHIPPED | OUTPATIENT
Start: 2022-12-14 | End: 2023-02-12

## 2022-12-14 RX ORDER — PREGABALIN 50 MG/1
50 CAPSULE ORAL 3 TIMES DAILY
Qty: 60 CAPSULE | Refills: 1 | Status: SHIPPED | OUTPATIENT
Start: 2022-12-14 | End: 2023-03-08 | Stop reason: SDUPTHER

## 2022-12-14 NOTE — PROGRESS NOTES
She Disclaimer: This note has been generated using voice-recognition software. There may be typographical errors that have been missed during proof-reading        Patient ID: Kd Joshua is a 22 y.o. female.      Chief Complaint: Mid-back Pain        22-year-old female returns for re-evaluation following completion of physical therapy.  She has  a long history of chronic lower back pain, scoliosis,  status post Falcon gilles placement 2017.  She notes some improvement following physical therapy and is now involved in chiropractic manipulation with additional benefit.  Her pain is primarily midthoracic without radicular symptoms to the upper or lower extremities.  She was unable to tolerate gabapentin due to hair loss.  She is no longer taking Celebrex.  She returns today to discuss additional medication management.  She is not a candidate for opiates due to THC use.                      Pain Assessment  Pain Assessment: 0-10  Pain Score:   9  Pain Location: Other (Comment) (mid back)  Pain Descriptors: Dull, Constant  Pain Frequency: Continuous  Pain Onset: Awakened from sleep  Clinical Progression: Not changed  Aggravating Factors: Standing, Walking, Other (Comment) (sitting and lying)  Pain Intervention(s): Heat applied      A's of Opioid Risk Assessment  Activity:Patient can perform ADL.   Analgesia:Patients pain is partially controlled by current medication.   Adverse Effects: Patient denies constipation or sedation.  Aberrant Behavior:  reviewed with no aberrant drug seeking/taking behavior.      Patient denies any suicidal or homicidal ideations    Physical Therapy/Home Exercise: yes            Review of Systems   Constitutional: Negative.    HENT: Negative.     Eyes: Negative.    Respiratory: Negative.     Cardiovascular: Negative.    Gastrointestinal: Negative.    Endocrine: Negative.    Genitourinary: Negative.    Musculoskeletal:  Positive for arthralgias and back pain.   Integumentary:  Negative.    Neurological: Negative.    Hematological: Negative.    Psychiatric/Behavioral:  Positive for sleep disturbance.            Past Medical History:   Diagnosis Date    Celiac disease     Chlamydia infection     Endometriosis     Herpes simplex virus (HSV) infection     Mental disorder     anexity and depression    Ovarian cyst     Scoliosis      Past Surgical History:   Procedure Laterality Date    SPINAL FUSION  10/03/2017    SURGICAL REMOVAL OF ENDOMETRIOSIS  2014    TONSILLECTOMY  2014     Social History     Socioeconomic History    Marital status: Single   Tobacco Use    Smoking status: Never    Smokeless tobacco: Never   Substance and Sexual Activity    Alcohol use: Never    Drug use: Yes     Types: Marijuana     Comment: every couple of days    Sexual activity: Yes     Partners: Male     Birth control/protection: Condom     Comment: Last sexual intercourse 9/9/2021     Family History   Problem Relation Age of Onset    Lupus Paternal Grandmother     Cancer Maternal Grandmother     Diabetes Maternal Grandfather     Cancer Paternal Aunt     Cancer Maternal Aunt     Cancer Maternal Uncle      Review of patient's allergies indicates:   Allergen Reactions    Codeine     Oxycodone Itching     has a current medication list which includes the following prescription(s): fluticasone propionate, prednisone, valacyclovir, amitriptyline, celecoxib, cyclobenzaprine, gabapentin, ibuprofen, ipratropium, norelgestromin-ethinyl estradiol, ondansetron, pregabalin, tizanidine, and tramadol.      Objective:  Vitals:    12/14/22 1448   BP: 108/62   Pulse: 86        Physical Exam  Vitals and nursing note reviewed.   Constitutional:       General: She is not in acute distress.     Appearance: Normal appearance. She is not ill-appearing, toxic-appearing or diaphoretic.   HENT:      Head: Normocephalic and atraumatic.      Nose: Nose normal.      Mouth/Throat:      Mouth: Mucous membranes are moist.   Eyes:      Extraocular Movements:  Extraocular movements intact.      Pupils: Pupils are equal, round, and reactive to light.   Cardiovascular:      Rate and Rhythm: Normal rate and regular rhythm.      Heart sounds: Normal heart sounds.   Pulmonary:      Effort: Pulmonary effort is normal. No respiratory distress.      Breath sounds: Normal breath sounds. No stridor. No wheezing or rhonchi.   Abdominal:      General: Bowel sounds are normal.      Palpations: Abdomen is soft.   Musculoskeletal:         General: No swelling or deformity.      Cervical back: Normal and normal range of motion. No spasms or tenderness. No pain with movement. Normal range of motion.      Thoracic back: Normal.      Lumbar back: Spasms and tenderness present. No bony tenderness. Decreased range of motion. Negative right straight leg raise test and negative left straight leg raise test. No scoliosis.      Right lower leg: No edema.      Left lower leg: No edema.   Skin:     General: Skin is warm.   Neurological:      General: No focal deficit present.      Mental Status: She is alert and oriented to person, place, and time. Mental status is at baseline.      Cranial Nerves: No cranial nerve deficit.      Sensory: Sensation is intact. No sensory deficit.      Motor: No weakness.      Coordination: Coordination normal.      Gait: Gait normal.      Deep Tendon Reflexes: Reflexes are normal and symmetric.   Psychiatric:         Mood and Affect: Mood normal.         Behavior: Behavior normal.         Assessment:      1. Chronic bilateral low back pain without sciatica    2. Thoracic spine pain    3. Scoliosis of lumbar spine, unspecified scoliosis type            Plan:  1. reviewed  2.Addiction, Dependency, Tolerance, Opioid abuse-misuse, Death, Diversion Discussed. Overdose reversal drug Naloxone discussed.  3. Discontinue gabapentin and Celebrex and start Lyrica for neuropathic pain and Flexeril for muscle spasticity       Requested Prescriptions     Signed Prescriptions  Disp Refills    pregabalin (LYRICA) 50 MG capsule 60 capsule 1     Sig: Take 1 capsule (50 mg total) by mouth 3 (three) times daily.    cyclobenzaprine (FLEXERIL) 10 MG tablet 90 tablet 1     Sig: Take 1 tablet (10 mg total) by mouth 3 (three) times daily as needed for Muscle spasms.     4. Continue chiropractic manipulation for back pain     5. Return in 2 months for re-evaluation     report:  Reviewed and consistent with medication use as prescribed.      The total time spent for evaluation and management on 12/14/2022 including reviewing separately obtained history, performing a medically appropriate exam and evaluation, documenting clinical information in the health record, independently interpreting results and communicating them to the patient/family/caregiver, and ordering medications/tests/procedures was between 15-29 minutes.    The above plan and management options were discussed at length with patient. Patient is in agreement with the above and verbalized understanding. It will be communicated with the referring physician via electronic record, fax, or mail.

## 2023-03-08 ENCOUNTER — OFFICE VISIT (OUTPATIENT)
Dept: OBSTETRICS AND GYNECOLOGY | Facility: CLINIC | Age: 23
End: 2023-03-08
Payer: MEDICAID

## 2023-03-08 ENCOUNTER — OFFICE VISIT (OUTPATIENT)
Dept: PAIN MEDICINE | Facility: CLINIC | Age: 23
End: 2023-03-08
Payer: MEDICAID

## 2023-03-08 VITALS
BODY MASS INDEX: 20.83 KG/M2 | SYSTOLIC BLOOD PRESSURE: 115 MMHG | HEIGHT: 66 IN | DIASTOLIC BLOOD PRESSURE: 75 MMHG | WEIGHT: 129.63 LBS | OXYGEN SATURATION: 99 % | TEMPERATURE: 98 F | HEART RATE: 92 BPM | RESPIRATION RATE: 18 BRPM

## 2023-03-08 VITALS
BODY MASS INDEX: 20.89 KG/M2 | HEIGHT: 66 IN | HEART RATE: 109 BPM | DIASTOLIC BLOOD PRESSURE: 73 MMHG | TEMPERATURE: 99 F | WEIGHT: 130 LBS | RESPIRATION RATE: 18 BRPM | SYSTOLIC BLOOD PRESSURE: 121 MMHG

## 2023-03-08 DIAGNOSIS — N89.8 VAGINAL DISCHARGE: ICD-10-CM

## 2023-03-08 DIAGNOSIS — M41.9 SCOLIOSIS OF LUMBAR SPINE, UNSPECIFIED SCOLIOSIS TYPE: Chronic | ICD-10-CM

## 2023-03-08 DIAGNOSIS — M54.50 CHRONIC BILATERAL LOW BACK PAIN WITHOUT SCIATICA: Primary | Chronic | ICD-10-CM

## 2023-03-08 DIAGNOSIS — N94.10 DYSPAREUNIA, FEMALE: ICD-10-CM

## 2023-03-08 DIAGNOSIS — M54.6 THORACIC SPINE PAIN: Chronic | ICD-10-CM

## 2023-03-08 DIAGNOSIS — G89.29 CHRONIC BILATERAL LOW BACK PAIN WITHOUT SCIATICA: Primary | Chronic | ICD-10-CM

## 2023-03-08 DIAGNOSIS — Z72.51 HIGH RISK SEXUAL BEHAVIOR, UNSPECIFIED TYPE: ICD-10-CM

## 2023-03-08 DIAGNOSIS — A60.00 GENITAL HERPES SIMPLEX, UNSPECIFIED SITE: Primary | ICD-10-CM

## 2023-03-08 LAB
CANDIDA SPECIES: NEGATIVE
GARDNERELLA: POSITIVE
TRICHOMONAS: NEGATIVE

## 2023-03-08 PROCEDURE — 3074F PR MOST RECENT SYSTOLIC BLOOD PRESSURE < 130 MM HG: ICD-10-PCS | Mod: CPTII,,, | Performed by: ADVANCED PRACTICE MIDWIFE

## 2023-03-08 PROCEDURE — 3074F PR MOST RECENT SYSTOLIC BLOOD PRESSURE < 130 MM HG: ICD-10-PCS | Mod: CPTII,,, | Performed by: PAIN MEDICINE

## 2023-03-08 PROCEDURE — 3078F DIAST BP <80 MM HG: CPT | Mod: CPTII,,, | Performed by: PAIN MEDICINE

## 2023-03-08 PROCEDURE — 87491 CHLMYD TRACH DNA AMP PROBE: CPT | Mod: ,,, | Performed by: CLINICAL MEDICAL LABORATORY

## 2023-03-08 PROCEDURE — 3078F PR MOST RECENT DIASTOLIC BLOOD PRESSURE < 80 MM HG: ICD-10-PCS | Mod: CPTII,,, | Performed by: PAIN MEDICINE

## 2023-03-08 PROCEDURE — 1159F MED LIST DOCD IN RCRD: CPT | Mod: CPTII,,, | Performed by: ADVANCED PRACTICE MIDWIFE

## 2023-03-08 PROCEDURE — 99213 PR OFFICE/OUTPT VISIT, EST, LEVL III, 20-29 MIN: ICD-10-PCS | Mod: ,,, | Performed by: ADVANCED PRACTICE MIDWIFE

## 2023-03-08 PROCEDURE — 3008F BODY MASS INDEX DOCD: CPT | Mod: CPTII,,, | Performed by: PAIN MEDICINE

## 2023-03-08 PROCEDURE — 87660 BACTERIAL VAGINOSIS: ICD-10-PCS | Mod: ,,, | Performed by: CLINICAL MEDICAL LABORATORY

## 2023-03-08 PROCEDURE — 99214 PR OFFICE/OUTPT VISIT, EST, LEVL IV, 30-39 MIN: ICD-10-PCS | Mod: S$PBB,,, | Performed by: PAIN MEDICINE

## 2023-03-08 PROCEDURE — 3078F DIAST BP <80 MM HG: CPT | Mod: CPTII,,, | Performed by: ADVANCED PRACTICE MIDWIFE

## 2023-03-08 PROCEDURE — 99214 OFFICE O/P EST MOD 30 MIN: CPT | Mod: PBBFAC | Performed by: PAIN MEDICINE

## 2023-03-08 PROCEDURE — 3074F SYST BP LT 130 MM HG: CPT | Mod: CPTII,,, | Performed by: ADVANCED PRACTICE MIDWIFE

## 2023-03-08 PROCEDURE — 99214 OFFICE O/P EST MOD 30 MIN: CPT | Mod: S$PBB,,, | Performed by: PAIN MEDICINE

## 2023-03-08 PROCEDURE — 3074F SYST BP LT 130 MM HG: CPT | Mod: CPTII,,, | Performed by: PAIN MEDICINE

## 2023-03-08 PROCEDURE — 1159F PR MEDICATION LIST DOCUMENTED IN MEDICAL RECORD: ICD-10-PCS | Mod: CPTII,,, | Performed by: ADVANCED PRACTICE MIDWIFE

## 2023-03-08 PROCEDURE — 87591 N.GONORRHOEAE DNA AMP PROB: CPT | Mod: ,,, | Performed by: CLINICAL MEDICAL LABORATORY

## 2023-03-08 PROCEDURE — 87480 BACTERIAL VAGINOSIS: ICD-10-PCS | Mod: ,,, | Performed by: CLINICAL MEDICAL LABORATORY

## 2023-03-08 PROCEDURE — 87480 CANDIDA DNA DIR PROBE: CPT | Mod: ,,, | Performed by: CLINICAL MEDICAL LABORATORY

## 2023-03-08 PROCEDURE — 99213 OFFICE O/P EST LOW 20 MIN: CPT | Mod: ,,, | Performed by: ADVANCED PRACTICE MIDWIFE

## 2023-03-08 PROCEDURE — 87510 GARDNER VAG DNA DIR PROBE: CPT | Mod: ,,, | Performed by: CLINICAL MEDICAL LABORATORY

## 2023-03-08 PROCEDURE — 87591 CHLAMYDIA/GONORRHOEAE(GC), PCR: ICD-10-PCS | Mod: ,,, | Performed by: CLINICAL MEDICAL LABORATORY

## 2023-03-08 PROCEDURE — 87491 CHLAMYDIA/GONORRHOEAE(GC), PCR: ICD-10-PCS | Mod: ,,, | Performed by: CLINICAL MEDICAL LABORATORY

## 2023-03-08 PROCEDURE — 87660 TRICHOMONAS VAGIN DIR PROBE: CPT | Mod: ,,, | Performed by: CLINICAL MEDICAL LABORATORY

## 2023-03-08 PROCEDURE — 87510 BACTERIAL VAGINOSIS: ICD-10-PCS | Mod: ,,, | Performed by: CLINICAL MEDICAL LABORATORY

## 2023-03-08 PROCEDURE — 3078F PR MOST RECENT DIASTOLIC BLOOD PRESSURE < 80 MM HG: ICD-10-PCS | Mod: CPTII,,, | Performed by: ADVANCED PRACTICE MIDWIFE

## 2023-03-08 PROCEDURE — 3008F PR BODY MASS INDEX (BMI) DOCUMENTED: ICD-10-PCS | Mod: CPTII,,, | Performed by: ADVANCED PRACTICE MIDWIFE

## 2023-03-08 PROCEDURE — 3008F PR BODY MASS INDEX (BMI) DOCUMENTED: ICD-10-PCS | Mod: CPTII,,, | Performed by: PAIN MEDICINE

## 2023-03-08 PROCEDURE — 3008F BODY MASS INDEX DOCD: CPT | Mod: CPTII,,, | Performed by: ADVANCED PRACTICE MIDWIFE

## 2023-03-08 RX ORDER — AMOXICILLIN 500 MG/1
500 TABLET, FILM COATED ORAL EVERY 6 HOURS PRN
COMMUNITY
Start: 2023-02-23 | End: 2023-03-08 | Stop reason: ALTCHOICE

## 2023-03-08 RX ORDER — CYCLOBENZAPRINE HCL 10 MG
10 TABLET ORAL 3 TIMES DAILY PRN
COMMUNITY
End: 2023-03-08 | Stop reason: SDUPTHER

## 2023-03-08 RX ORDER — CYCLOBENZAPRINE HCL 10 MG
10 TABLET ORAL 3 TIMES DAILY PRN
Qty: 90 TABLET | Refills: 2 | Status: SHIPPED | OUTPATIENT
Start: 2023-03-08 | End: 2023-04-07

## 2023-03-08 RX ORDER — AMITRIPTYLINE HYDROCHLORIDE 25 MG/1
25 TABLET, FILM COATED ORAL NIGHTLY
Qty: 90 TABLET | Refills: 2 | Status: SHIPPED | OUTPATIENT
Start: 2023-03-08 | End: 2023-06-07 | Stop reason: SDUPTHER

## 2023-03-08 RX ORDER — VALACYCLOVIR HYDROCHLORIDE 1 G/1
1000 TABLET, FILM COATED ORAL DAILY
Qty: 30 TABLET | Refills: 1 | Status: SHIPPED | OUTPATIENT
Start: 2023-03-08

## 2023-03-08 RX ORDER — PREGABALIN 50 MG/1
50 CAPSULE ORAL 3 TIMES DAILY
Qty: 60 CAPSULE | Refills: 2 | Status: SHIPPED | OUTPATIENT
Start: 2023-03-08 | End: 2023-06-07 | Stop reason: SDUPTHER

## 2023-03-08 RX ORDER — CELECOXIB 200 MG/1
200 CAPSULE ORAL DAILY
Qty: 30 CAPSULE | Refills: 2 | Status: SHIPPED | OUTPATIENT
Start: 2023-03-08 | End: 2023-03-21 | Stop reason: ALTCHOICE

## 2023-03-08 NOTE — PROGRESS NOTES
She Disclaimer: This note has been generated using voice-recognition software. There may be typographical errors that have been missed during proof-reading        Patient ID: Kd Joshua is a 22 y.o. female.      Chief Complaint: No chief complaint on file.        22 year old female returns for re-evaluation of chronic thoracic and lumbar pain, status post Falcon gilles placement 10/2021.  Physical therapy failed to provide relief.  She is not a candidate for opiates due to chronic THC use.  She returns today for medication refill.  She denies any changes since her last office visit.  Her pain remains axial without a radicular component.    .                Pain Assessment  Pain Assessment: 0-10  Pain Score:   9  Pain Location: Back  Pain Orientation: Lower, Mid  Pain Descriptors: Aching, Sharp, Dull  Pain Frequency: Continuous  Pain Onset: On-going  Clinical Progression: Not changed  Aggravating Factors: Bending, Stretching, Exercise, Standing, Walking      A's of Opioid Risk Assessment  Activity:Patient can perform ADL.   Analgesia:Patients pain is partially controlled by current medication.   Adverse Effects: Patient denies constipation or sedation.  Aberrant Behavior:  reviewed with no aberrant drug seeking/taking behavior.      Patient denies any suicidal or homicidal ideations    Physical Therapy/Home Exercise: yes            Review of Systems   Constitutional: Negative.    HENT: Negative.     Eyes: Negative.    Respiratory: Negative.     Cardiovascular: Negative.    Gastrointestinal: Negative.    Endocrine: Negative.    Genitourinary: Negative.    Musculoskeletal:  Positive for arthralgias and back pain.   Integumentary:  Negative.   Neurological: Negative.    Hematological: Negative.    Psychiatric/Behavioral:  Positive for sleep disturbance.            Past Medical History:   Diagnosis Date    Celiac disease     Chlamydia infection     Endometriosis     Herpes simplex virus (HSV) infection      Mental disorder     anexity and depression    Ovarian cyst     Scoliosis      Past Surgical History:   Procedure Laterality Date    SPINAL FUSION  10/03/2017    SURGICAL REMOVAL OF ENDOMETRIOSIS  2014    TONSILLECTOMY  2014     Social History     Socioeconomic History    Marital status: Single   Tobacco Use    Smoking status: Never    Smokeless tobacco: Never   Substance and Sexual Activity    Alcohol use: Never    Drug use: Yes     Types: Marijuana     Comment: every couple of days    Sexual activity: Yes     Partners: Male     Birth control/protection: Condom     Comment: Last sexual intercourse 9/9/2021     Family History   Problem Relation Age of Onset    Lupus Paternal Grandmother     Ovarian cancer Maternal Grandmother     Cancer Maternal Grandmother     Diabetes Maternal Grandfather     Breast cancer Maternal Aunt     Cancer Maternal Aunt     Cancer Maternal Uncle     Cancer Paternal Aunt      Review of patient's allergies indicates:   Allergen Reactions    Codeine     Oxycodone Itching     has a current medication list which includes the following prescription(s): ipratropium, amitriptyline, celecoxib, cyclobenzaprine, fluticasone propionate, metronidazole, ondansetron, pregabalin, tizanidine, and valacyclovir.      Objective:  Vitals:    03/08/23 1349   BP: 121/73   Pulse: 109   Resp: 18   Temp: 99.1 °F (37.3 °C)        Physical Exam  Vitals and nursing note reviewed.   Constitutional:       General: She is not in acute distress.     Appearance: Normal appearance. She is not ill-appearing, toxic-appearing or diaphoretic.   HENT:      Head: Normocephalic and atraumatic.      Nose: Nose normal.      Mouth/Throat:      Mouth: Mucous membranes are moist.   Eyes:      Extraocular Movements: Extraocular movements intact.      Pupils: Pupils are equal, round, and reactive to light.   Cardiovascular:      Rate and Rhythm: Normal rate and regular rhythm.      Heart sounds: Normal heart sounds.   Pulmonary:       Effort: Pulmonary effort is normal. No respiratory distress.      Breath sounds: Normal breath sounds. No stridor. No wheezing or rhonchi.   Abdominal:      General: Bowel sounds are normal.      Palpations: Abdomen is soft.   Musculoskeletal:         General: No swelling or deformity.      Cervical back: Normal and normal range of motion. No spasms or tenderness. No pain with movement. Normal range of motion.      Thoracic back: Normal.      Lumbar back: Spasms and tenderness present. No bony tenderness. Decreased range of motion. Negative right straight leg raise test and negative left straight leg raise test. No scoliosis.      Right lower leg: No edema.      Left lower leg: No edema.   Skin:     General: Skin is warm.   Neurological:      General: No focal deficit present.      Mental Status: She is alert and oriented to person, place, and time. Mental status is at baseline.      Cranial Nerves: No cranial nerve deficit.      Sensory: Sensation is intact. No sensory deficit.      Motor: No weakness.      Coordination: Coordination normal.      Gait: Gait normal.      Deep Tendon Reflexes: Reflexes are normal and symmetric.   Psychiatric:         Mood and Affect: Mood normal.         Behavior: Behavior normal.         Assessment:      1. Chronic bilateral low back pain without sciatica    2. Thoracic spine pain    3. Scoliosis of lumbar spine, unspecified scoliosis type              Plan:  1. reviewed  2.Addiction, Dependency, Tolerance, Opioid abuse-misuse, Death, Diversion Discussed. Overdose reversal drug Naloxone discussed.  3. Discontinue gabapentin and Celebrex and start Lyrica for neuropathic pain and Flexeril for muscle spasticity       Requested Prescriptions     Signed Prescriptions Disp Refills    amitriptyline (ELAVIL) 25 MG tablet 90 tablet 2     Sig: Take 1 tablet (25 mg total) by mouth nightly.    celecoxib (CELEBREX) 200 MG capsule 30 capsule 2     Sig: Take 1 capsule (200 mg total) by mouth  once daily.    cyclobenzaprine (FLEXERIL) 10 MG tablet 90 tablet 2     Sig: Take 1 tablet (10 mg total) by mouth 3 (three) times daily as needed for Muscle spasms.    pregabalin (LYRICA) 50 MG capsule 60 capsule 2     Sig: Take 1 capsule (50 mg total) by mouth 3 (three) times daily.     4. Return in 3 months for re-evaluation     report:  Reviewed and consistent with medication use as prescribed.      The total time spent for evaluation and management on 03/14/2023 including reviewing separately obtained history, performing a medically appropriate exam and evaluation, documenting clinical information in the health record, independently interpreting results and communicating them to the patient/family/caregiver, and ordering medications/tests/procedures was between 15-29 minutes.    The above plan and management options were discussed at length with patient. Patient is in agreement with the above and verbalized understanding. It will be communicated with the referring physician via electronic record, fax, or mail.

## 2023-03-08 NOTE — PROGRESS NOTES
Subjective:       Patient ID: Kd Joshua is a 22 y.o. female.    Chief Complaint: Painful [place] (Room 4// Pt states a few days ago she noticed a lump on her left labia that is sore and states it hurts when she has sex.)    Ms Joshua has a swelling on her left labia for 2-3 days.  Having sex is painfu to the.  She is not on birth control.  LMP 3/1/23.  She has a h/o endometriosis and has had surgery and multiple drug treatment.  She declines any birth control.    Review of Systems   Genitourinary:  Positive for dyspareunia and genital sores.   All other systems reviewed and are negative.      Objective:      Physical Exam  Constitutional:       Appearance: She is normal weight.   HENT:      Head: Normocephalic.      Nose: Nose normal.   Eyes:      Extraocular Movements: Extraocular movements intact.   Cardiovascular:      Rate and Rhythm: Normal rate.   Pulmonary:      Effort: Pulmonary effort is normal.   Abdominal:      General: Abdomen is flat.      Palpations: Abdomen is soft.   Genitourinary:     Comments: I do not see any swelling of the labia.  It is tender.  There is a one cm crack in the skin at the introitus.  There is a white vaginal discharge.  Skin:     General: Skin is warm and dry.   Neurological:      Mental Status: She is alert and oriented to person, place, and time.   Psychiatric:         Mood and Affect: Mood normal.         Behavior: Behavior normal.       Assessment:       Problem List Items Addressed This Visit    None  Visit Diagnoses       Genital herpes simplex, unspecified site    -  Primary    Relevant Medications    valACYclovir (VALTREX) 1000 MG tablet    High risk sexual behavior, unspecified type        Relevant Orders    Chlamydia/GC, PCR    Bacterial Vaginosis              Plan:     Rx for Valtrex    Call result of Affirm    Condoms recommended.

## 2023-03-09 ENCOUNTER — TELEPHONE (OUTPATIENT)
Dept: OBSTETRICS AND GYNECOLOGY | Facility: CLINIC | Age: 23
End: 2023-03-09
Payer: MEDICAID

## 2023-03-09 DIAGNOSIS — N76.0 BACTERIAL VAGINOSIS: Primary | ICD-10-CM

## 2023-03-09 DIAGNOSIS — B96.89 BACTERIAL VAGINOSIS: Primary | ICD-10-CM

## 2023-03-09 LAB
CHLAMYDIA BY PCR: NEGATIVE
N. GONORRHOEAE (GC) BY PCR: NEGATIVE

## 2023-03-09 RX ORDER — METRONIDAZOLE 500 MG/1
500 TABLET ORAL 2 TIMES DAILY
Qty: 14 TABLET | Refills: 0 | Status: SHIPPED | OUTPATIENT
Start: 2023-03-09 | End: 2023-03-16

## 2023-03-09 NOTE — TELEPHONE ENCOUNTER
----- Message from Annmarie Gutiérrez CNM sent at 3/9/2023 10:00 AM CST -----  Review with pt.as neg.except for BV and I sent Flagyl

## 2023-03-21 ENCOUNTER — OFFICE VISIT (OUTPATIENT)
Dept: OBSTETRICS AND GYNECOLOGY | Facility: CLINIC | Age: 23
End: 2023-03-21
Payer: MEDICAID

## 2023-03-21 VITALS
RESPIRATION RATE: 17 BRPM | OXYGEN SATURATION: 99 % | HEIGHT: 66 IN | TEMPERATURE: 99 F | WEIGHT: 133.38 LBS | SYSTOLIC BLOOD PRESSURE: 125 MMHG | DIASTOLIC BLOOD PRESSURE: 71 MMHG | BODY MASS INDEX: 21.44 KG/M2 | HEART RATE: 101 BPM

## 2023-03-21 DIAGNOSIS — N92.1 MENORRHAGIA WITH IRREGULAR CYCLE: Primary | ICD-10-CM

## 2023-03-21 DIAGNOSIS — R10.2 PELVIC PAIN: ICD-10-CM

## 2023-03-21 DIAGNOSIS — A60.00 GENITAL HERPES SIMPLEX, UNSPECIFIED SITE: ICD-10-CM

## 2023-03-21 DIAGNOSIS — N94.10 DYSPAREUNIA, FEMALE: ICD-10-CM

## 2023-03-21 DIAGNOSIS — Z87.42 HISTORY OF ENDOMETRIOSIS: ICD-10-CM

## 2023-03-21 LAB
BASOPHILS # BLD AUTO: 0.02 K/UL (ref 0–0.2)
BASOPHILS NFR BLD AUTO: 0.3 % (ref 0–1)
DIFFERENTIAL METHOD BLD: ABNORMAL
EOSINOPHIL # BLD AUTO: 0.12 K/UL (ref 0–0.5)
EOSINOPHIL NFR BLD AUTO: 1.8 % (ref 1–4)
ERYTHROCYTE [DISTWIDTH] IN BLOOD BY AUTOMATED COUNT: 14.6 % (ref 11.5–14.5)
HCT VFR BLD AUTO: 42 % (ref 38–47)
HGB BLD-MCNC: 13.5 G/DL (ref 12–16)
IMM GRANULOCYTES # BLD AUTO: 0.01 K/UL (ref 0–0.04)
IMM GRANULOCYTES NFR BLD: 0.2 % (ref 0–0.4)
LYMPHOCYTES # BLD AUTO: 2.5 K/UL (ref 1–4.8)
LYMPHOCYTES NFR BLD AUTO: 38.3 % (ref 27–41)
MCH RBC QN AUTO: 29.9 PG (ref 27–31)
MCHC RBC AUTO-ENTMCNC: 32.1 G/DL (ref 32–36)
MCV RBC AUTO: 92.9 FL (ref 80–96)
MONOCYTES # BLD AUTO: 0.53 K/UL (ref 0–0.8)
MONOCYTES NFR BLD AUTO: 8.1 % (ref 2–6)
MPC BLD CALC-MCNC: 9.2 FL (ref 9.4–12.4)
NEUTROPHILS # BLD AUTO: 3.35 K/UL (ref 1.8–7.7)
NEUTROPHILS NFR BLD AUTO: 51.3 % (ref 53–65)
NRBC # BLD AUTO: 0 X10E3/UL
NRBC, AUTO (.00): 0 %
PLATELET # BLD AUTO: 349 K/UL (ref 150–400)
RBC # BLD AUTO: 4.52 M/UL (ref 4.2–5.4)
WBC # BLD AUTO: 6.53 K/UL (ref 4.5–11)

## 2023-03-21 PROCEDURE — 1159F PR MEDICATION LIST DOCUMENTED IN MEDICAL RECORD: ICD-10-PCS | Mod: CPTII,,, | Performed by: ADVANCED PRACTICE MIDWIFE

## 2023-03-21 PROCEDURE — 85025 COMPLETE CBC W/AUTO DIFF WBC: CPT | Mod: ,,, | Performed by: CLINICAL MEDICAL LABORATORY

## 2023-03-21 PROCEDURE — 1159F MED LIST DOCD IN RCRD: CPT | Mod: CPTII,,, | Performed by: ADVANCED PRACTICE MIDWIFE

## 2023-03-21 PROCEDURE — 99213 PR OFFICE/OUTPT VISIT, EST, LEVL III, 20-29 MIN: ICD-10-PCS | Mod: ,,, | Performed by: ADVANCED PRACTICE MIDWIFE

## 2023-03-21 PROCEDURE — 3078F PR MOST RECENT DIASTOLIC BLOOD PRESSURE < 80 MM HG: ICD-10-PCS | Mod: CPTII,,, | Performed by: ADVANCED PRACTICE MIDWIFE

## 2023-03-21 PROCEDURE — 85025 CBC WITH DIFFERENTIAL: ICD-10-PCS | Mod: ,,, | Performed by: CLINICAL MEDICAL LABORATORY

## 2023-03-21 PROCEDURE — 3008F PR BODY MASS INDEX (BMI) DOCUMENTED: ICD-10-PCS | Mod: CPTII,,, | Performed by: ADVANCED PRACTICE MIDWIFE

## 2023-03-21 PROCEDURE — 3078F DIAST BP <80 MM HG: CPT | Mod: CPTII,,, | Performed by: ADVANCED PRACTICE MIDWIFE

## 2023-03-21 PROCEDURE — 3008F BODY MASS INDEX DOCD: CPT | Mod: CPTII,,, | Performed by: ADVANCED PRACTICE MIDWIFE

## 2023-03-21 PROCEDURE — 3074F SYST BP LT 130 MM HG: CPT | Mod: CPTII,,, | Performed by: ADVANCED PRACTICE MIDWIFE

## 2023-03-21 PROCEDURE — 99213 OFFICE O/P EST LOW 20 MIN: CPT | Mod: ,,, | Performed by: ADVANCED PRACTICE MIDWIFE

## 2023-03-21 PROCEDURE — 3074F PR MOST RECENT SYSTOLIC BLOOD PRESSURE < 130 MM HG: ICD-10-PCS | Mod: CPTII,,, | Performed by: ADVANCED PRACTICE MIDWIFE

## 2023-03-21 NOTE — PROGRESS NOTES
Subjective:       Patient ID: Kd Joshua is a 22 y.o. female.    Chief Complaint: Painful [place] (And vaginal soreness)    Ms Menezes took the Flagyl for BV and the Valtrex cleared the HSV.  She is still having pain with intercourse in her lower abdomen and points to her symphysis are where it hurts and inside.  She had surgerty for endometriosis and then took Depo.  She would like to resume Depo but she had unprotected sex yesterday.  Her LMP was 3/1/23.  Periods are 4-5 days with two heavy painful days.  I told her we can start Depo when her period starts. Or two weeks with no unprotected sex.  She is using Dial soap, advised to use Dove unscented and buy a personal lubricant.  Review of Systems   Constitutional:  Positive for fatigue.   HENT: Negative.     Respiratory: Negative.     Cardiovascular: Negative.    Genitourinary:  Positive for dyspareunia, menstrual irregularity, menstrual problem and vaginal dryness.   Musculoskeletal:  Positive for back pain and myalgias.   Neurological: Negative.    Psychiatric/Behavioral: Negative.         Objective:      Physical Exam  Vitals and nursing note reviewed.   Constitutional:       Appearance: She is normal weight.   HENT:      Head: Normocephalic.      Nose: Nose normal.   Eyes:      Extraocular Movements: Extraocular movements intact.   Cardiovascular:      Rate and Rhythm: Normal rate.   Pulmonary:      Effort: Pulmonary effort is normal.   Abdominal:      General: Abdomen is flat.      Palpations: Abdomen is soft.      Hernia: There is no hernia in the left inguinal area or right inguinal area.   Genitourinary:     General: Normal vulva.      Pubic Area: No rash.       Labia:         Right: Tenderness present. No lesion.         Left: Tenderness present. No lesion.       Vagina: Normal. No tenderness.      Cervix: No cervical motion tenderness or discharge.      Uterus: Normal. Not tender.       Adnexa: Right adnexa normal and left adnexa normal.       Comments: Vaginal appear dry. No bleeding noted.  She is tender in the area of the SP  Skin:     General: Skin is warm and dry.   Neurological:      Mental Status: She is alert and oriented to person, place, and time.   Psychiatric:         Mood and Affect: Mood normal.         Behavior: Behavior normal.       Assessment:       Problem List Items Addressed This Visit    None  Visit Diagnoses       Menorrhagia with irregular cycle    -  Primary    Relevant Orders    CBC Auto Differential    Genital herpes simplex, unspecified site        History of endometriosis        Relevant Orders    US Pelvis Complete Non OB    Pelvic pain        Relevant Orders    US Pelvis Complete Non OB    Dyspareunia, female        Relevant Orders    US Pelvis Complete Non OB            Plan:     Give Depo Provera when menses starts   Pelvic u/s   Check CBC   F/u 3 weeks   Use a personal lubicant and vary intercourse positions to find mos comfortable.

## 2023-03-22 ENCOUNTER — TELEPHONE (OUTPATIENT)
Dept: OBSTETRICS AND GYNECOLOGY | Facility: CLINIC | Age: 23
End: 2023-03-22
Payer: MEDICAID

## 2023-03-22 NOTE — TELEPHONE ENCOUNTER
----- Message from Annmarie Gutiérrez CNM sent at 3/22/2023 10:18 AM CDT -----  Review with pt.as she is not anemic

## 2023-03-24 ENCOUNTER — HOSPITAL ENCOUNTER (OUTPATIENT)
Dept: RADIOLOGY | Facility: HOSPITAL | Age: 23
Discharge: HOME | End: 2023-03-24
Attending: ADVANCED PRACTICE MIDWIFE
Payer: MEDICAID

## 2023-03-24 DIAGNOSIS — R10.2 PELVIC PAIN: ICD-10-CM

## 2023-03-24 DIAGNOSIS — Z87.42 HISTORY OF ENDOMETRIOSIS: ICD-10-CM

## 2023-03-24 DIAGNOSIS — N94.10 DYSPAREUNIA, FEMALE: ICD-10-CM

## 2023-03-24 PROCEDURE — 76856 US PELVIS COMPLETE NON OB: ICD-10-PCS | Mod: 26,,, | Performed by: RADIOLOGY

## 2023-03-24 PROCEDURE — 76856 US EXAM PELVIC COMPLETE: CPT | Mod: TC

## 2023-03-24 PROCEDURE — 76856 US EXAM PELVIC COMPLETE: CPT | Mod: 26,,, | Performed by: RADIOLOGY

## 2023-03-27 ENCOUNTER — PATIENT MESSAGE (OUTPATIENT)
Dept: OBSTETRICS AND GYNECOLOGY | Facility: CLINIC | Age: 23
End: 2023-03-27
Payer: MEDICAID

## 2023-03-27 DIAGNOSIS — N83.202 CYST OF LEFT OVARY: ICD-10-CM

## 2023-03-27 DIAGNOSIS — R10.2 PELVIC PAIN: Primary | ICD-10-CM

## 2023-04-27 ENCOUNTER — OFFICE VISIT (OUTPATIENT)
Dept: OBSTETRICS AND GYNECOLOGY | Facility: CLINIC | Age: 23
End: 2023-04-27
Payer: MEDICAID

## 2023-04-27 VITALS
BODY MASS INDEX: 21.24 KG/M2 | HEIGHT: 66 IN | SYSTOLIC BLOOD PRESSURE: 123 MMHG | WEIGHT: 132.19 LBS | DIASTOLIC BLOOD PRESSURE: 78 MMHG | HEART RATE: 93 BPM

## 2023-04-27 DIAGNOSIS — Z87.42 HX OF ENDOMETRIOSIS: ICD-10-CM

## 2023-04-27 DIAGNOSIS — N94.6 DYSMENORRHEA: ICD-10-CM

## 2023-04-27 DIAGNOSIS — Z01.419 WOMEN'S ANNUAL ROUTINE GYNECOLOGICAL EXAMINATION: Primary | ICD-10-CM

## 2023-04-27 PROCEDURE — 88141 CYTOPATH C/V INTERPRET: CPT | Mod: ,,, | Performed by: PATHOLOGY

## 2023-04-27 PROCEDURE — 88142 CYTOPATH C/V THIN LAYER: CPT | Mod: TC,GCY | Performed by: ADVANCED PRACTICE MIDWIFE

## 2023-04-27 PROCEDURE — 3074F PR MOST RECENT SYSTOLIC BLOOD PRESSURE < 130 MM HG: ICD-10-PCS | Mod: CPTII,,, | Performed by: ADVANCED PRACTICE MIDWIFE

## 2023-04-27 PROCEDURE — 1159F MED LIST DOCD IN RCRD: CPT | Mod: CPTII,,, | Performed by: ADVANCED PRACTICE MIDWIFE

## 2023-04-27 PROCEDURE — 3078F PR MOST RECENT DIASTOLIC BLOOD PRESSURE < 80 MM HG: ICD-10-PCS | Mod: CPTII,,, | Performed by: ADVANCED PRACTICE MIDWIFE

## 2023-04-27 PROCEDURE — 88141 THINPREP PAP TEST: ICD-10-PCS | Mod: ,,, | Performed by: PATHOLOGY

## 2023-04-27 PROCEDURE — 99395 PREV VISIT EST AGE 18-39: CPT | Mod: ,,, | Performed by: ADVANCED PRACTICE MIDWIFE

## 2023-04-27 PROCEDURE — 3074F SYST BP LT 130 MM HG: CPT | Mod: CPTII,,, | Performed by: ADVANCED PRACTICE MIDWIFE

## 2023-04-27 PROCEDURE — 3008F BODY MASS INDEX DOCD: CPT | Mod: CPTII,,, | Performed by: ADVANCED PRACTICE MIDWIFE

## 2023-04-27 PROCEDURE — 3008F PR BODY MASS INDEX (BMI) DOCUMENTED: ICD-10-PCS | Mod: CPTII,,, | Performed by: ADVANCED PRACTICE MIDWIFE

## 2023-04-27 PROCEDURE — 99395 PR PREVENTIVE VISIT,EST,18-39: ICD-10-PCS | Mod: ,,, | Performed by: ADVANCED PRACTICE MIDWIFE

## 2023-04-27 PROCEDURE — 1159F PR MEDICATION LIST DOCUMENTED IN MEDICAL RECORD: ICD-10-PCS | Mod: CPTII,,, | Performed by: ADVANCED PRACTICE MIDWIFE

## 2023-04-27 PROCEDURE — 3078F DIAST BP <80 MM HG: CPT | Mod: CPTII,,, | Performed by: ADVANCED PRACTICE MIDWIFE

## 2023-04-27 RX ORDER — IBUPROFEN 800 MG/1
800 TABLET ORAL EVERY 8 HOURS PRN
Qty: 60 TABLET | Refills: 2 | Status: SHIPPED | OUTPATIENT
Start: 2023-04-27 | End: 2024-01-08 | Stop reason: SDUPTHER

## 2023-04-27 NOTE — PROGRESS NOTES
"  Patient ID:  Kd Joshua is a 23 y.o. female      Chief Complaint:   Chief Complaint   Patient presents with    Annual Exam     Just pap.         HPI:  Kd Joshua is in for repeat depo injection within dates. Denies ACHES, vag discharge, and abnormal vag bleeding. No issues, problems, or complaints. Desires to continue depo injections.   LMP: Patient's last menstrual period was 2023.  Sexually active: ***    Past Medical History:   Diagnosis Date    Celiac disease     Chlamydia infection     Endometriosis     Herpes simplex virus (HSV) infection     Mental disorder     anexity and depression    Ovarian cyst     Scoliosis        Past Surgical History:   Procedure Laterality Date    SPINAL FUSION  10/03/2017    SURGICAL REMOVAL OF ENDOMETRIOSIS      TONSILLECTOMY         OB History          0    Para   0    Term   0       0    AB   0    Living   0         SAB   0    IAB   0    Ectopic   0    Multiple   0    Live Births   0                 /78 (BP Location: Right arm, Patient Position: Sitting, BP Method: Large (Automatic))   Pulse 93   Ht 5' 6" (1.676 m)   Wt 60 kg (132 lb 3.2 oz)   LMP 2023   BMI 21.34 kg/m²   Wt Readings from Last 3 Encounters:   23 60 kg (132 lb 3.2 oz)   23 60.5 kg (133 lb 6.4 oz)   23 58.8 kg (129 lb 9.6 oz)          ROS:  Review of Systems       PHYSICAL EXAM:  Physical Exam     Assessment:  Women's annual routine gynecological examination  -     ThinPrep Pap Test; Future; Expected date: 2023    Hx of endometriosis  -     Ambulatory referral/consult to Obstetrics / Gynecology; Future; Expected date: 2023  -     ibuprofen (ADVIL,MOTRIN) 800 MG tablet; Take 1 tablet (800 mg total) by mouth every 8 (eight) hours as needed for Pain. Take 1 tablet every 8 hours during menstrual cycles for up to 7 days. Take with food.  Dispense: 60 tablet; Refill: 2    Dysmenorrhea  -     ibuprofen (ADVIL,MOTRIN) 800 MG tablet; " "Take 1 tablet (800 mg total) by mouth every 8 (eight) hours as needed for Pain. Take 1 tablet every 8 hours during menstrual cycles for up to 7 days. Take with food.  Dispense: 60 tablet; Refill: 2          ICD-10-CM ICD-9-CM    1. Women's annual routine gynecological examination  Z01.419 V72.31 ThinPrep Pap Test      2. Hx of endometriosis  Z87.42 V13.29 Ambulatory referral/consult to Obstetrics / Gynecology      ibuprofen (ADVIL,MOTRIN) 800 MG tablet      3. Dysmenorrhea  N94.6 625.3 ibuprofen (ADVIL,MOTRIN) 800 MG tablet          Plan:  Depo Provera 150 mg IM given, Reviewed "ACHES" of contraceptives and possible S/E  Encouraged Vitamin D and calcium supplements or daily multivitamin  Instructed on condom use during each sexual encounter to decrease STD exposure risk    Follow up for with Dr. Coyle for consult.                    "

## 2023-04-29 PROBLEM — N94.6 DYSMENORRHEA: Status: ACTIVE | Noted: 2023-04-29

## 2023-04-29 PROBLEM — Z87.42 HX OF ENDOMETRIOSIS: Status: ACTIVE | Noted: 2023-04-29

## 2023-04-29 NOTE — PROGRESS NOTES
Patient ID: Kd Joshua is a 23 y.o. female     Chief Complaint:   Chief Complaint   Patient presents with    Annual Exam     Just pap.        HPI: Kd Joshua is a 23 y.o. female who presents for well woman exam and Pap. C/O painful menstrual cycles. Reports hx ovarian cysts and being dx with endometriosis at age 16 by Dr. Restrepo. Was started on OCP after endometriosis dx which she took for 1 years, then received depo injections for 2 years before stopping. Last contraceptive use was 7/2022 when she tried contraceptive patches x 1 month. Stopped contraceptive as she did not like s/e and does not desire to restart. Had a visit with Dr. Carlson 12/22/22 and declined all recommendations as documented in her note. Pelvic US done 3/24/23: simple left ovarian cyst, no other abnormality. Today she states she does not feel as though anyone is trying to manage her endometriosis pain and is not happy with her care. I reviewed all options. POC discussed and agreed for Pap test today, referral to Dr. Coyle to discuss management of endometriosis, and rx NSAIDs for pain management. .   LMP: Patient's last menstrual period was 04/22/2023.  Sexually active: yes, declines STD testing   Contraceptive: none, declines  Mammogram: n/a    Past Medical History:   Diagnosis Date    Celiac disease     Chlamydia infection     Endometriosis     Herpes simplex virus (HSV) infection     Mental disorder     anexity and depression    Ovarian cyst     Scoliosis        Past Surgical History:   Procedure Laterality Date    SPINAL FUSION  10/03/2017    SURGICAL REMOVAL OF ENDOMETRIOSIS  2014    TONSILLECTOMY  2014       Social History     Socioeconomic History    Marital status: Single   Tobacco Use    Smoking status: Never    Smokeless tobacco: Never   Substance and Sexual Activity    Alcohol use: Never    Drug use: Yes     Types: Marijuana     Comment: every couple of days    Sexual activity: Yes     Partners: Male     Birth  "control/protection: Condom     Comment: Last sexual intercourse 2021       Family History   Problem Relation Age of Onset    Lupus Paternal Grandmother     Ovarian cancer Maternal Grandmother     Cancer Maternal Grandmother     Diabetes Maternal Grandfather     Breast cancer Maternal Aunt     Cancer Maternal Aunt     Cancer Maternal Uncle     Cancer Paternal Aunt        OB History          0    Para   0    Term   0       0    AB   0    Living   0         SAB   0    IAB   0    Ectopic   0    Multiple   0    Live Births   0                 /78 (BP Location: Right arm, Patient Position: Sitting, BP Method: Large (Automatic))   Pulse 93   Ht 5' 6" (1.676 m)   Wt 60 kg (132 lb 3.2 oz)   LMP 2023   BMI 21.34 kg/m²     Wt Readings from Last 3 Encounters:   23 60 kg (132 lb 3.2 oz)   23 60.5 kg (133 lb 6.4 oz)   23 58.8 kg (129 lb 9.6 oz)        ROS:  Review of Systems   Constitutional: Negative.    Eyes: Negative.    Respiratory: Negative.     Cardiovascular: Negative.    Gastrointestinal: Negative.    Endocrine: Negative.    Genitourinary: Negative.    Musculoskeletal: Negative.    Integumentary:  Negative.   Neurological: Negative.    Hematological: Negative.    Psychiatric/Behavioral: Negative.     Breast: negative.       PHYSICAL EXAM:  Physical Exam  Constitutional:       Appearance: Normal appearance. She is normal weight.   Eyes:      Extraocular Movements: Extraocular movements intact.   Cardiovascular:      Rate and Rhythm: Normal rate and regular rhythm.      Pulses: Normal pulses.      Heart sounds: Normal heart sounds.   Pulmonary:      Effort: Pulmonary effort is normal.      Breath sounds: Normal breath sounds.   Chest:   Breasts:     Right: Normal.      Left: Normal.   Abdominal:      Palpations: Abdomen is soft.      Hernia: There is no hernia in the left inguinal area or right inguinal area.      Comments: No c/o abd/pelvic pain during exam "   Genitourinary:     General: Normal vulva.      Exam position: Lithotomy position.      Pubic Area: No rash.       Labia:         Right: No rash.         Left: No rash.       Urethra: No prolapse.      Vagina: Normal.      Cervix: Normal.      Uterus: Normal.       Adnexa:         Right: No mass or tenderness.          Left: No mass or tenderness.     Musculoskeletal:         General: Normal range of motion.      Cervical back: Normal range of motion and neck supple.   Skin:     General: Skin is warm and dry.   Neurological:      Mental Status: She is alert and oriented to person, place, and time.   Psychiatric:         Mood and Affect: Mood normal.         Behavior: Behavior normal.         Thought Content: Thought content normal.         Judgment: Judgment normal.        Assessment:  Women's annual routine gynecological examination  -     ThinPrep Pap Test; Future; Expected date: 04/27/2023    Hx of endometriosis  -     Ambulatory referral/consult to Obstetrics / Gynecology; Future; Expected date: 05/04/2023  -     ibuprofen (ADVIL,MOTRIN) 800 MG tablet; Take 1 tablet (800 mg total) by mouth every 8 (eight) hours as needed for Pain. Take 1 tablet every 8 hours during menstrual cycles for up to 7 days. Take with food.  Dispense: 60 tablet; Refill: 2    Dysmenorrhea  -     ibuprofen (ADVIL,MOTRIN) 800 MG tablet; Take 1 tablet (800 mg total) by mouth every 8 (eight) hours as needed for Pain. Take 1 tablet every 8 hours during menstrual cycles for up to 7 days. Take with food.  Dispense: 60 tablet; Refill: 2    BMI 21.0-21.9, adult          ICD-10-CM ICD-9-CM    1. Women's annual routine gynecological examination  Z01.419 V72.31 ThinPrep Pap Test      ThinPrep Pap Test      2. Hx of endometriosis  Z87.42 V13.29 Ambulatory referral/consult to Obstetrics / Gynecology      ibuprofen (ADVIL,MOTRIN) 800 MG tablet      3. Dysmenorrhea  N94.6 625.3 ibuprofen (ADVIL,MOTRIN) 800 MG tablet      4. BMI 21.0-21.9, adult  Z68.21  V85.1           Plan:  Annual exam completed, no abnormality noted  Pap collected, will call or send My Chart message after results reviewed  Patient was counseled today on [facility]P Pap guidelines and recommendations for yearly pelvic exams   Encouraged healthy dietary choices, increasing water intake, and exercising at least 3 x weekly  Rx Ibuprofen sent for pain management, instructed to use every 8 hours during menstrual cycles   Referral order sent for appointment with Dr. Coyle    Follow up for with Dr. Coyle for consult.

## 2023-05-03 LAB
GH SERPL-MCNC: ABNORMAL NG/ML
INSULIN SERPL-ACNC: ABNORMAL U[IU]/ML
LAB AP CLINICAL INFORMATION: ABNORMAL
LAB AP GYN INTERPRETATION: ABNORMAL
LAB AP PAP DISCLAIMER COMMENTS: ABNORMAL
RENIN PLAS-CCNC: ABNORMAL NG/ML/H

## 2023-05-11 ENCOUNTER — OFFICE VISIT (OUTPATIENT)
Dept: OBSTETRICS AND GYNECOLOGY | Facility: CLINIC | Age: 23
End: 2023-05-11
Payer: MEDICAID

## 2023-05-11 VITALS
BODY MASS INDEX: 21.01 KG/M2 | WEIGHT: 130.19 LBS | DIASTOLIC BLOOD PRESSURE: 62 MMHG | SYSTOLIC BLOOD PRESSURE: 102 MMHG

## 2023-05-11 DIAGNOSIS — Z30.9 ENCOUNTER FOR CONTRACEPTIVE MANAGEMENT, UNSPECIFIED TYPE: ICD-10-CM

## 2023-05-11 DIAGNOSIS — N94.6 DYSMENORRHEA: Primary | ICD-10-CM

## 2023-05-11 DIAGNOSIS — R87.612 PAP SMEAR ABNORMALITY OF CERVIX WITH LGSIL: ICD-10-CM

## 2023-05-11 DIAGNOSIS — N93.8 DYSFUNCTIONAL UTERINE BLEEDING: ICD-10-CM

## 2023-05-11 DIAGNOSIS — Z87.42 HX OF ENDOMETRIOSIS: ICD-10-CM

## 2023-05-11 PROCEDURE — 88142 CYTOPATH C/V THIN LAYER: CPT | Mod: TC,GCY | Performed by: OBSTETRICS & GYNECOLOGY

## 2023-05-11 PROCEDURE — 99213 OFFICE O/P EST LOW 20 MIN: CPT | Mod: PBBFAC | Performed by: OBSTETRICS & GYNECOLOGY

## 2023-05-11 PROCEDURE — 87624 HPV HI-RISK TYP POOLED RSLT: CPT | Mod: ,,, | Performed by: CLINICAL MEDICAL LABORATORY

## 2023-05-11 PROCEDURE — 3074F PR MOST RECENT SYSTOLIC BLOOD PRESSURE < 130 MM HG: ICD-10-PCS | Mod: CPTII,,, | Performed by: OBSTETRICS & GYNECOLOGY

## 2023-05-11 PROCEDURE — 3008F BODY MASS INDEX DOCD: CPT | Mod: CPTII,,, | Performed by: OBSTETRICS & GYNECOLOGY

## 2023-05-11 PROCEDURE — 99213 OFFICE O/P EST LOW 20 MIN: CPT | Mod: S$PBB,,, | Performed by: OBSTETRICS & GYNECOLOGY

## 2023-05-11 PROCEDURE — 88141 THINPREP PAP TEST: ICD-10-PCS | Mod: ,,, | Performed by: PATHOLOGY

## 2023-05-11 PROCEDURE — 88141 CYTOPATH C/V INTERPRET: CPT | Mod: ,,, | Performed by: PATHOLOGY

## 2023-05-11 PROCEDURE — 3008F PR BODY MASS INDEX (BMI) DOCUMENTED: ICD-10-PCS | Mod: CPTII,,, | Performed by: OBSTETRICS & GYNECOLOGY

## 2023-05-11 PROCEDURE — 3078F DIAST BP <80 MM HG: CPT | Mod: CPTII,,, | Performed by: OBSTETRICS & GYNECOLOGY

## 2023-05-11 PROCEDURE — 99213 PR OFFICE/OUTPT VISIT, EST, LEVL III, 20-29 MIN: ICD-10-PCS | Mod: S$PBB,,, | Performed by: OBSTETRICS & GYNECOLOGY

## 2023-05-11 PROCEDURE — 1159F MED LIST DOCD IN RCRD: CPT | Mod: CPTII,,, | Performed by: OBSTETRICS & GYNECOLOGY

## 2023-05-11 PROCEDURE — 3074F SYST BP LT 130 MM HG: CPT | Mod: CPTII,,, | Performed by: OBSTETRICS & GYNECOLOGY

## 2023-05-11 PROCEDURE — 3078F PR MOST RECENT DIASTOLIC BLOOD PRESSURE < 80 MM HG: ICD-10-PCS | Mod: CPTII,,, | Performed by: OBSTETRICS & GYNECOLOGY

## 2023-05-11 PROCEDURE — 1159F PR MEDICATION LIST DOCUMENTED IN MEDICAL RECORD: ICD-10-PCS | Mod: CPTII,,, | Performed by: OBSTETRICS & GYNECOLOGY

## 2023-05-11 PROCEDURE — 87624 HUMAN PAPILLOMAVIRUS (HPV): ICD-10-PCS | Mod: ,,, | Performed by: CLINICAL MEDICAL LABORATORY

## 2023-05-11 RX ORDER — ACETAMINOPHEN AND CODEINE PHOSPHATE 120; 12 MG/5ML; MG/5ML
1 SOLUTION ORAL DAILY
Qty: 30 TABLET | Refills: 5 | Status: SHIPPED | OUTPATIENT
Start: 2023-05-11 | End: 2023-08-12 | Stop reason: SDUPTHER

## 2023-05-11 RX ORDER — CYCLOBENZAPRINE HCL 10 MG
TABLET ORAL
COMMUNITY
End: 2023-06-07 | Stop reason: SDUPTHER

## 2023-05-11 NOTE — PROGRESS NOTES
Presents new to my office.      Presents for problem visit.      Referred by Naheed Peacock.  Previous Pap 2023 revealed low-grade dysplasia.      In addition she has a long history of significant dysmenorrhea.      She had a laparoscopic exam at age 16 states endometriosis was identified.      She was placed on Depo-Provera for 2 years.  She is also been on oral contraceptives in the past.  However she stopped these stating this did not help dysmenorrhea.    Presently using condoms for contraception.  Subjective:       Patient ID: Kd Joshua is a 23 y.o. female.    Chief Complaint: Abnormal Pap Smear (NEW PT,here for discussion of abnormal pap results done per Ayush in 2023)    Pelvic Pain  The patient's primary symptoms include pelvic pain. This is a recurrent problem. The current episode started more than 1 year ago. The problem occurs daily. The problem has been waxing and waning. The pain is moderate. The problem affects both sides. She is not pregnant. Associated symptoms include abdominal pain, anorexia, back pain, constipation, dysuria, flank pain, nausea, painful intercourse and vomiting. Pertinent negatives include no chills, diarrhea, discolored urine, fever, frequency, headaches, hematuria, rash or urgency. The symptoms are aggravated by tactile pressure, urinating and menstrual cycle. She has tried acetaminophen, antibiotics, heating pad, NSAIDs, oral narcotics and warm bath for the symptoms. The treatment provided mild relief. She is sexually active. Yes, her partner has an STD. She uses nothing and the rhythm method for contraception. Her menstrual history has been irregular. Her past medical history is significant for endometriosis, herpes simplex, menorrhagia, ovarian cysts, an STD and vaginosis. There is no history of an abdominal surgery, a  section, an ectopic pregnancy, a gynecological surgery, metrorrhagia, miscarriage, perineal abscess, PID or a terminated  pregnancy.   Review of Systems   Constitutional:  Negative for chills and fever.   Gastrointestinal:  Positive for abdominal pain, anorexia, constipation, nausea and vomiting. Negative for diarrhea.   Genitourinary:  Positive for dysuria, flank pain, menorrhagia and pelvic pain. Negative for frequency, hematuria and urgency.   Musculoskeletal:  Positive for back pain.   Integumentary:  Negative for rash.   Neurological:  Negative for headaches.       Objective:      Physical Exam  Genitourinary:     Comments: Abdomen soft nondistended nontender.  Pelvic exam external normal vault normal cervix normal to appearance grossly.  Pap repeated.  Light spotting was noted.  Bimanual exam revealed uterus anteflexed rectovaginal exam was unremarkable however on close examination there appears to be nodule along the left fundal aspect of the uterus.  Could not rule out ovarian enlargement.  Versus fibroid.      Discussed checking hCG rule out ectopic.      Previous ultrasound March 2023 did reveal a 3 cm simple appearing cyst of the left ovary.      Assessment:       1. Dysmenorrhea    2. Hx of endometriosis    3. Pap smear abnormality of cervix with LGSIL    4. Encounter for contraceptive management, unspecified type    5. Dysfunctional uterine bleeding        Plan:       Patient Instructions   Discussed alternating Advil and Tylenol as needed for pain.      Discussed the options of proceeding with laparoscopic examination versus 2-3 months of progestin only oral contraceptives.    After discussion she desires trying oral contraceptives for now.      We have discussed follow-up with me in 3 months.      Check hCG and CBC today.      We discussed follow-up earlier if any worsening symptoms occur.  We will proceed with laparoscopic exam if no success with progestin only oral contraceptives.      Discussed depression she will notify me if any suicidal thoughts occur.  She will notify me if any worsening condition noted on oral  contraceptives

## 2023-05-16 LAB
HPV 16: NEGATIVE
HPV 18: NEGATIVE
HPV OTHER: POSITIVE

## 2023-05-19 DIAGNOSIS — R87.618 PAP SMEAR ABNORMALITY OF CERVIX/HUMAN PAPILLOMAVIRUS (HPV) POSITIVE: Primary | ICD-10-CM

## 2023-06-07 PROBLEM — M41.9 SCOLIOSIS OF LUMBAR SPINE: Status: ACTIVE | Noted: 2021-09-30

## 2023-06-07 PROBLEM — M54.6 THORACIC SPINE PAIN: Status: ACTIVE | Noted: 2023-06-07

## 2023-06-07 PROBLEM — M54.50 CHRONIC BILATERAL LOW BACK PAIN WITHOUT SCIATICA: Chronic | Status: ACTIVE | Noted: 2023-06-07

## 2023-06-07 PROBLEM — G89.29 CHRONIC BILATERAL LOW BACK PAIN WITHOUT SCIATICA: Chronic | Status: ACTIVE | Noted: 2023-06-07

## 2023-06-07 NOTE — PROGRESS NOTES
Subjective:         Patient ID: Kd Joshua is a 23 y.o. female.    Chief Complaint: Mid-back Pain      Pain  This is a chronic problem. The current episode started more than 1 year ago. The problem occurs daily. The problem has been waxing and waning. Associated symptoms include arthralgias. Pertinent negatives include no anorexia, change in bowel habit, chest pain, chills, coughing, diaphoresis, fever, neck pain, rash, sore throat, swollen glands, urinary symptoms, vertigo or vomiting.   Review of Systems   Constitutional:  Negative for activity change, appetite change, chills, diaphoresis, fever and unexpected weight change.   HENT:  Negative for drooling, ear discharge, ear pain, facial swelling, nosebleeds, sore throat, trouble swallowing, voice change and goiter.    Eyes:  Negative for photophobia, pain, discharge, redness and visual disturbance.   Respiratory:  Negative for apnea, cough, choking, chest tightness, shortness of breath, wheezing and stridor.    Cardiovascular:  Negative for chest pain, palpitations and leg swelling.   Gastrointestinal:  Negative for abdominal distention, anorexia, change in bowel habit, diarrhea, rectal pain, vomiting, fecal incontinence and change in bowel habit.   Endocrine: Negative for cold intolerance, heat intolerance, polydipsia, polyphagia and polyuria.   Genitourinary:  Negative for bladder incontinence, dysuria, flank pain, frequency and hot flashes.   Musculoskeletal:  Positive for arthralgias, back pain and leg pain. Negative for neck pain.   Integumentary:  Negative for color change, pallor and rash.   Allergic/Immunologic: Negative for immunocompromised state.   Neurological:  Negative for dizziness, vertigo, seizures, syncope, facial asymmetry, speech difficulty, light-headedness, coordination difficulties, memory loss and coordination difficulties.   Hematological:  Negative for adenopathy. Does not bruise/bleed easily.   Psychiatric/Behavioral:  Negative  for agitation, behavioral problems, confusion, decreased concentration, dysphoric mood, hallucinations, self-injury and suicidal ideas. The patient is not nervous/anxious and is not hyperactive.          Past Medical History:   Diagnosis Date    Amenorrhea 2014    Started to regulate after 2years on depo (2019)    Anxiety 2020    Celiac disease     Chlamydia infection     Depression 2020    Dyspareunia 2020    Endometriosis, herpes, birth control, cysts    Endometriosis     Heart murmur 2017    Benign    Herpes simplex virus (HSV) infection     Mental disorder     anexity and depression    Ovarian cyst     Scoliosis     STD (sexually transmitted disease) 2020    Herpes     Past Surgical History:   Procedure Laterality Date    ENDOMETRIAL ABLATION  2016    HYSTEROSCOPY      PELVIC LAPAROSCOPY  2022    SPINAL FUSION  10/03/2017    SURGICAL REMOVAL OF ENDOMETRIOSIS  2014    TONSILLECTOMY  2014     Social History     Socioeconomic History    Marital status: Single   Tobacco Use    Smoking status: Never    Smokeless tobacco: Never   Substance and Sexual Activity    Alcohol use: Never    Drug use: Not Currently     Types: Marijuana     Comment: every couple of days    Sexual activity: Yes     Partners: Male     Birth control/protection: Condom, Rhythm, None     Comment: Last sexual intercourse 9/9/2021     Family History   Problem Relation Age of Onset    Lupus Paternal Grandmother     Ovarian cancer Maternal Grandmother     Cancer Maternal Grandmother         Ovarian cancer    Diabetes Maternal Grandfather     Heart failure Maternal Grandfather     Breast cancer Maternal Aunt         Breast cancer reoccurring    Cancer Maternal Aunt     Cancer Maternal Uncle     Cancer Paternal Aunt     Diabetes Paternal Aunt         Diabetes    Heart failure Father     Cancer Maternal Aunt         Breast cancer reoccurring    Heart failure Maternal Aunt      Review of patient's allergies indicates:   Allergen Reactions    Codeine      "Oxycodone Itching        Objective:  Vitals:    06/08/23 1256   BP: 101/66   Pulse: 94   Resp: 16   Weight: 60.3 kg (133 lb)   Height: 5' 6" (1.676 m)   PainSc:   8         Physical Exam  Vitals and nursing note reviewed. Exam conducted with a chaperone present.   Constitutional:       General: She is awake. She is not in acute distress.     Appearance: Normal appearance. She is not ill-appearing, toxic-appearing or diaphoretic.   HENT:      Head: Normocephalic and atraumatic.      Nose: Nose normal.      Mouth/Throat:      Mouth: Mucous membranes are moist.      Pharynx: Oropharynx is clear.   Eyes:      Conjunctiva/sclera: Conjunctivae normal.      Pupils: Pupils are equal, round, and reactive to light.   Cardiovascular:      Rate and Rhythm: Normal rate.   Pulmonary:      Effort: Pulmonary effort is normal. No respiratory distress.   Abdominal:      Palpations: Abdomen is soft.      Tenderness: There is no guarding.   Musculoskeletal:         General: Normal range of motion.      Cervical back: Normal range of motion and neck supple. No rigidity.   Skin:     General: Skin is warm and dry.      Coloration: Skin is not jaundiced or pale.   Neurological:      General: No focal deficit present.      Mental Status: She is alert and oriented to person, place, and time. Mental status is at baseline.      Cranial Nerves: No cranial nerve deficit (II-XII).   Psychiatric:         Mood and Affect: Mood normal.         Behavior: Behavior normal. Behavior is cooperative.         Thought Content: Thought content normal.         US Pelvis Complete Non OB  Narrative: EXAMINATION:  US PELVIS COMPLETE NON OB    CLINICAL HISTORY:  Personal history of other diseases of the female genital tract    TECHNIQUE:  Transabdominal grayscale and color Doppler ultrasound performed of the uterus and adnexa.  Ultrasound images captured and stored.    COMPARISON:  07/18/2022    FINDINGS:  Uterus appears normal measuring 7.3 x 3.6 x 4.8 cm.  " Endometrial stripe 1 cm.  Right ovary is normal.  There is a simple cyst left ovary measuring 3 x 1.7 x 3.3 cm.  Small amount of free fluid in the cul-de-sac.  Impression: Simple appearing left ovarian cyst.    Electronically signed by: Angel Conroy  Date:    03/24/2023  Time:    16:19       Lab Visit on 05/11/2023   Component Date Value Ref Range Status    Pregnancy, Serum 05/11/2023 Negative  Negative, QNS Final    WBC 05/11/2023 6.28  4.50 - 11.00 K/uL Final    RBC 05/11/2023 4.98  4.20 - 5.40 M/uL Final    Hemoglobin 05/11/2023 14.8  12.0 - 16.0 g/dL Final    Hematocrit 05/11/2023 47.5 (H)  38.0 - 47.0 % Final    MCV 05/11/2023 95.4  80.0 - 96.0 fL Final    MCH 05/11/2023 29.7  27.0 - 31.0 pg Final    MCHC 05/11/2023 31.2 (L)  32.0 - 36.0 g/dL Final    RDW 05/11/2023 14.2  11.5 - 14.5 % Final    Platelet Count 05/11/2023 305  150 - 400 K/uL Final    MPV 05/11/2023 10.3  9.4 - 12.4 fL Final    Neutrophils % 05/11/2023 42.4 (L)  53.0 - 65.0 % Final    Lymphocytes % 05/11/2023 44.9 (H)  27.0 - 41.0 % Final    Monocytes % 05/11/2023 9.9 (H)  2.0 - 6.0 % Final    Eosinophils % 05/11/2023 2.1  1.0 - 4.0 % Final    Basophils % 05/11/2023 0.5  0.0 - 1.0 % Final    Immature Granulocytes % 05/11/2023 0.2  0.0 - 0.4 % Final    nRBC, Auto 05/11/2023 0.0  <=0.0 % Final    Neutrophils, Abs 05/11/2023 2.67  1.80 - 7.70 K/uL Final    Lymphocytes, Absolute 05/11/2023 2.82  1.00 - 4.80 K/uL Final    Monocytes, Absolute 05/11/2023 0.62  0.00 - 0.80 K/uL Final    Eosinophils, Absolute 05/11/2023 0.13  0.00 - 0.50 K/uL Final    Basophils, Absolute 05/11/2023 0.03  0.00 - 0.20 K/uL Final    Immature Granulocytes, Absolute 05/11/2023 0.01  0.00 - 0.04 K/uL Final    nRBC, Absolute 05/11/2023 0.00  <=0.00 x10e3/uL Final    Diff Type 05/11/2023 Auto   Final   Office Visit on 05/11/2023   Component Date Value Ref Range Status    Case Report 05/11/2023    Final                    Value:Pap Cytology                                      Case:  [id number]                                   Authorizing Provider:  Eladio Coyle MD      Collected:           05/11/2023 10:59 AM          Ordering Location:     [facility] Received:            05/12/2023 08:19 AM                                 - Obstetrics And                                                                                    Gynecology                                                                   First Screen:          SAMMY Wilson(ASCP)                                                    Pathologist:           Demario Foster III, MD                                                                           Specimen:    Liquid-Based Pap Test, Screening, Endocervix                                               Interpretation 05/11/2023 Atypical Squamous Cells of Undetermined Significance (A)              Final    General Categorization 05/11/2023 Epithelial Cell Abnormality   Final    Specimen Adequacy 05/11/2023 Satisfactory for evaluation   Final    Clinical Information 05/11/2023    Final                    Value:This result contains rich text formatting which cannot be displayed here.    Disclaimer 05/11/2023    Final                    Value:This result contains rich text formatting which cannot be displayed here.    HPV 16 05/11/2023 Negative  Negative, Invalid Final    HPV 18 05/11/2023 Negative  Negative, Invalid Final    HPV Other 05/11/2023 Positive (A)  Negative, Invalid Final   Office Visit on 04/27/2023   Component Date Value Ref Range Status    Case Report 04/27/2023    Final                    Value:Pap Cytology                                      Case: [id number]                                   Authorizing Provider:  Naheed Peacock CNM        Collected:           04/27/2023 11:57 AM          Ordering Location:     [facility]       Received:            04/27/2023  11:57 AM                                 Clinic - Obstetrics and                                                                             Gynecology                                                                   First Screen:          SAMMY Up(ASCP)                                                        Pathologist:           Eulalio Zhang MD                                                       Specimen:    Liquid-Based Pap Test, Screening, Cervix                                                   Interpretation 04/27/2023 Low Grade Squamous Intraepithelial Lesion (A)              Final    General Categorization 04/27/2023 Epithelial Cell Abnormality   Final    Specimen Adequacy 04/27/2023 Satisfactory for evaluation   Final    Clinical Information 04/27/2023    Final                    Value:This result contains rich text formatting which cannot be displayed here.    Disclaimer 04/27/2023    Final                    Value:This result contains rich text formatting which cannot be displayed here.   Office Visit on 03/21/2023   Component Date Value Ref Range Status    WBC 03/21/2023 6.53  4.50 - 11.00 K/uL Final    RBC 03/21/2023 4.52  4.20 - 5.40 M/uL Final    Hemoglobin 03/21/2023 13.5  12.0 - 16.0 g/dL Final    Hematocrit 03/21/2023 42.0  38.0 - 47.0 % Final    MCV 03/21/2023 92.9  80.0 - 96.0 fL Final    MCH 03/21/2023 29.9  27.0 - 31.0 pg Final    MCHC 03/21/2023 32.1  32.0 - 36.0 g/dL Final    RDW 03/21/2023 14.6 (H)  11.5 - 14.5 % Final    Platelet Count 03/21/2023 349  150 - 400 K/uL Final    MPV 03/21/2023 9.2 (L)  9.4 - 12.4 fL Final    Neutrophils % 03/21/2023 51.3 (L)  53.0 - 65.0 % Final    Lymphocytes % 03/21/2023 38.3  27.0 - 41.0 % Final    Monocytes % 03/21/2023 8.1 (H)  2.0 - 6.0 % Final    Eosinophils % 03/21/2023 1.8  1.0 - 4.0 % Final    Basophils % 03/21/2023 0.3  0.0 - 1.0 % Final    Immature Granulocytes % 03/21/2023 0.2  0.0 - 0.4 % Final    nRBC, Auto 03/21/2023 0.0  <=0.0  % Final    Neutrophils, Abs 03/21/2023 3.35  1.80 - 7.70 K/uL Final    Lymphocytes, Absolute 03/21/2023 2.50  1.00 - 4.80 K/uL Final    Monocytes, Absolute 03/21/2023 0.53  0.00 - 0.80 K/uL Final    Eosinophils, Absolute 03/21/2023 0.12  0.00 - 0.50 K/uL Final    Basophils, Absolute 03/21/2023 0.02  0.00 - 0.20 K/uL Final    Immature Granulocytes, Absolute 03/21/2023 0.01  0.00 - 0.04 K/uL Final    nRBC, Absolute 03/21/2023 0.00  <=0.00 x10e3/uL Final    Diff Type 03/21/2023 Auto   Final   Office Visit on 03/08/2023   Component Date Value Ref Range Status    Chlamydia by PCR 03/08/2023 Negative  Negative, Invalid Final    N. gonorrhoeae (GC) by PCR 03/08/2023 Negative  Negative, Invalid Final    Candida Species 03/08/2023 Negative  Negative, Invalid Final    Gardnerella 03/08/2023 Positive (A)  Negative, Invalid Final    Trichomonas 03/08/2023 Negative  Negative, Invalid Final         No orders of the defined types were placed in this encounter.      Requested Prescriptions     Pending Prescriptions Disp Refills    pregabalin (LYRICA) 50 MG capsule 60 capsule 2     Sig: Take 1 capsule (50 mg total) by mouth 3 (three) times daily.    amitriptyline (ELAVIL) 25 MG tablet 90 tablet 2     Sig: Take 1 tablet (25 mg total) by mouth nightly.    cyclobenzaprine (FLEXERIL) 10 MG tablet 90 tablet 2     Sig: Take 1 tablet (10 mg total) by mouth 3 (three) times daily as needed for Muscle spasms.       Assessment:     1. Thoracic spine pain    2. Scoliosis of lumbar spine, unspecified scoliosis type    3. Chronic bilateral low back pain without sciatica         A's of Opioid Risk Assessment  Activity:Patient can perform ADL.   Analgesia:Patients pain is partially controlled by current medication. Patient has tried OTC medications such as Tylenol and Ibuprofen with out relief.   Adverse Effects: Patient denies constipation or sedation.  Aberrant Behavior:  reviewed with no aberrant drug seeking/taking behavior.  Overdose  reversal drug naloxone discussed    Drug screen reviewed      Plan:     Not using narcotics from our office    History of Falcon gilles placement October 2021 subsequent explant of hardware due to pain    She has no new complaints today she states current medication does help primary care provider discontinued Celebrex    Continue nonnarcotic medication     She would like to continue conservative management     Follow-up 3 months     Dr. Luciano, March 2024    Bring original prescription medication bottles/container/box with labels to each visit

## 2023-06-08 ENCOUNTER — OFFICE VISIT (OUTPATIENT)
Dept: PAIN MEDICINE | Facility: CLINIC | Age: 23
End: 2023-06-08
Payer: MEDICAID

## 2023-06-08 VITALS
SYSTOLIC BLOOD PRESSURE: 101 MMHG | HEIGHT: 66 IN | BODY MASS INDEX: 21.38 KG/M2 | WEIGHT: 133 LBS | DIASTOLIC BLOOD PRESSURE: 66 MMHG | HEART RATE: 94 BPM | RESPIRATION RATE: 16 BRPM

## 2023-06-08 DIAGNOSIS — M41.9 SCOLIOSIS OF LUMBAR SPINE, UNSPECIFIED SCOLIOSIS TYPE: Chronic | ICD-10-CM

## 2023-06-08 DIAGNOSIS — M54.50 CHRONIC BILATERAL LOW BACK PAIN WITHOUT SCIATICA: Chronic | ICD-10-CM

## 2023-06-08 DIAGNOSIS — M54.6 THORACIC SPINE PAIN: Primary | Chronic | ICD-10-CM

## 2023-06-08 DIAGNOSIS — G89.29 CHRONIC BILATERAL LOW BACK PAIN WITHOUT SCIATICA: Chronic | ICD-10-CM

## 2023-06-08 PROCEDURE — 99214 OFFICE O/P EST MOD 30 MIN: CPT | Mod: PBBFAC | Performed by: PHYSICIAN ASSISTANT

## 2023-06-08 PROCEDURE — 3074F SYST BP LT 130 MM HG: CPT | Mod: CPTII,,, | Performed by: PHYSICIAN ASSISTANT

## 2023-06-08 PROCEDURE — 3074F PR MOST RECENT SYSTOLIC BLOOD PRESSURE < 130 MM HG: ICD-10-PCS | Mod: CPTII,,, | Performed by: PHYSICIAN ASSISTANT

## 2023-06-08 PROCEDURE — 99214 OFFICE O/P EST MOD 30 MIN: CPT | Mod: S$PBB,,, | Performed by: PHYSICIAN ASSISTANT

## 2023-06-08 PROCEDURE — 3078F PR MOST RECENT DIASTOLIC BLOOD PRESSURE < 80 MM HG: ICD-10-PCS | Mod: CPTII,,, | Performed by: PHYSICIAN ASSISTANT

## 2023-06-08 PROCEDURE — 1159F MED LIST DOCD IN RCRD: CPT | Mod: CPTII,,, | Performed by: PHYSICIAN ASSISTANT

## 2023-06-08 PROCEDURE — 1159F PR MEDICATION LIST DOCUMENTED IN MEDICAL RECORD: ICD-10-PCS | Mod: CPTII,,, | Performed by: PHYSICIAN ASSISTANT

## 2023-06-08 PROCEDURE — 3008F PR BODY MASS INDEX (BMI) DOCUMENTED: ICD-10-PCS | Mod: CPTII,,, | Performed by: PHYSICIAN ASSISTANT

## 2023-06-08 PROCEDURE — 3078F DIAST BP <80 MM HG: CPT | Mod: CPTII,,, | Performed by: PHYSICIAN ASSISTANT

## 2023-06-08 PROCEDURE — 3008F BODY MASS INDEX DOCD: CPT | Mod: CPTII,,, | Performed by: PHYSICIAN ASSISTANT

## 2023-06-08 PROCEDURE — 99214 PR OFFICE/OUTPT VISIT, EST, LEVL IV, 30-39 MIN: ICD-10-PCS | Mod: S$PBB,,, | Performed by: PHYSICIAN ASSISTANT

## 2023-06-08 RX ORDER — PREGABALIN 50 MG/1
50 CAPSULE ORAL 3 TIMES DAILY
Qty: 60 CAPSULE | Refills: 2 | Status: SHIPPED | OUTPATIENT
Start: 2023-06-08 | End: 2023-06-26 | Stop reason: SDUPTHER

## 2023-06-08 RX ORDER — CYCLOBENZAPRINE HCL 10 MG
10 TABLET ORAL 3 TIMES DAILY PRN
Qty: 90 TABLET | Refills: 2 | Status: SHIPPED | OUTPATIENT
Start: 2023-06-08 | End: 2023-09-06 | Stop reason: SDUPTHER

## 2023-06-08 RX ORDER — AMITRIPTYLINE HYDROCHLORIDE 25 MG/1
25 TABLET, FILM COATED ORAL NIGHTLY
Qty: 90 TABLET | Refills: 2 | Status: SHIPPED | OUTPATIENT
Start: 2023-06-08 | End: 2023-06-26 | Stop reason: SDUPTHER

## 2023-06-26 ENCOUNTER — OFFICE VISIT (OUTPATIENT)
Dept: PAIN MEDICINE | Facility: CLINIC | Age: 23
End: 2023-06-26
Payer: MEDICAID

## 2023-06-26 VITALS
SYSTOLIC BLOOD PRESSURE: 121 MMHG | DIASTOLIC BLOOD PRESSURE: 59 MMHG | RESPIRATION RATE: 18 BRPM | HEIGHT: 66 IN | WEIGHT: 134 LBS | BODY MASS INDEX: 21.53 KG/M2 | HEART RATE: 81 BPM

## 2023-06-26 DIAGNOSIS — M54.50 CHRONIC BILATERAL LOW BACK PAIN WITHOUT SCIATICA: Chronic | ICD-10-CM

## 2023-06-26 DIAGNOSIS — M41.9 SCOLIOSIS OF LUMBAR SPINE, UNSPECIFIED SCOLIOSIS TYPE: Primary | Chronic | ICD-10-CM

## 2023-06-26 DIAGNOSIS — G89.29 CHRONIC BILATERAL LOW BACK PAIN WITHOUT SCIATICA: Chronic | ICD-10-CM

## 2023-06-26 DIAGNOSIS — M54.6 THORACIC SPINE PAIN: Chronic | ICD-10-CM

## 2023-06-26 PROCEDURE — 1159F MED LIST DOCD IN RCRD: CPT | Mod: CPTII,,, | Performed by: PHYSICIAN ASSISTANT

## 2023-06-26 PROCEDURE — 3074F SYST BP LT 130 MM HG: CPT | Mod: CPTII,,, | Performed by: PHYSICIAN ASSISTANT

## 2023-06-26 PROCEDURE — 99214 PR OFFICE/OUTPT VISIT, EST, LEVL IV, 30-39 MIN: ICD-10-PCS | Mod: S$PBB,25,, | Performed by: PHYSICIAN ASSISTANT

## 2023-06-26 PROCEDURE — 99214 OFFICE O/P EST MOD 30 MIN: CPT | Mod: PBBFAC,25 | Performed by: PHYSICIAN ASSISTANT

## 2023-06-26 PROCEDURE — 99214 OFFICE O/P EST MOD 30 MIN: CPT | Mod: S$PBB,25,, | Performed by: PHYSICIAN ASSISTANT

## 2023-06-26 PROCEDURE — 1159F PR MEDICATION LIST DOCUMENTED IN MEDICAL RECORD: ICD-10-PCS | Mod: CPTII,,, | Performed by: PHYSICIAN ASSISTANT

## 2023-06-26 PROCEDURE — 3008F PR BODY MASS INDEX (BMI) DOCUMENTED: ICD-10-PCS | Mod: CPTII,,, | Performed by: PHYSICIAN ASSISTANT

## 2023-06-26 PROCEDURE — 3008F BODY MASS INDEX DOCD: CPT | Mod: CPTII,,, | Performed by: PHYSICIAN ASSISTANT

## 2023-06-26 PROCEDURE — 3078F DIAST BP <80 MM HG: CPT | Mod: CPTII,,, | Performed by: PHYSICIAN ASSISTANT

## 2023-06-26 PROCEDURE — 96372 THER/PROPH/DIAG INJ SC/IM: CPT | Mod: PBBFAC | Performed by: PHYSICIAN ASSISTANT

## 2023-06-26 PROCEDURE — 3074F PR MOST RECENT SYSTOLIC BLOOD PRESSURE < 130 MM HG: ICD-10-PCS | Mod: CPTII,,, | Performed by: PHYSICIAN ASSISTANT

## 2023-06-26 PROCEDURE — 3078F PR MOST RECENT DIASTOLIC BLOOD PRESSURE < 80 MM HG: ICD-10-PCS | Mod: CPTII,,, | Performed by: PHYSICIAN ASSISTANT

## 2023-06-26 RX ORDER — PREGABALIN 50 MG/1
50 CAPSULE ORAL 3 TIMES DAILY
Qty: 60 CAPSULE | Refills: 2 | Status: SHIPPED | OUTPATIENT
Start: 2023-06-26 | End: 2023-09-06 | Stop reason: SDUPTHER

## 2023-06-26 RX ORDER — AMITRIPTYLINE HYDROCHLORIDE 25 MG/1
25 TABLET, FILM COATED ORAL NIGHTLY
Qty: 90 TABLET | Refills: 2 | Status: SHIPPED | OUTPATIENT
Start: 2023-06-26 | End: 2023-09-06 | Stop reason: SDUPTHER

## 2023-06-26 RX ORDER — CYCLOBENZAPRINE HCL 10 MG
10 TABLET ORAL 3 TIMES DAILY PRN
Qty: 90 TABLET | Refills: 2 | Status: CANCELLED | OUTPATIENT
Start: 2023-06-26

## 2023-06-26 RX ORDER — METHOCARBAMOL 500 MG/1
500 TABLET, FILM COATED ORAL 3 TIMES DAILY PRN
Qty: 90 TABLET | Refills: 2 | Status: SHIPPED | OUTPATIENT
Start: 2023-06-26 | End: 2023-09-06 | Stop reason: SDUPTHER

## 2023-06-26 RX ORDER — KETOROLAC TROMETHAMINE 30 MG/ML
60 INJECTION, SOLUTION INTRAMUSCULAR; INTRAVENOUS
Status: COMPLETED | OUTPATIENT
Start: 2023-06-26 | End: 2023-06-26

## 2023-06-26 RX ADMIN — KETOROLAC TROMETHAMINE 60 MG: 60 INJECTION, SOLUTION INTRAMUSCULAR at 02:06

## 2023-06-26 NOTE — PROGRESS NOTES
Subjective:         Patient ID: Kd Joshua is a 23 y.o. female.    Chief Complaint: Low-back Pain      Pain  This is a chronic problem. The current episode started more than 1 year ago. The problem occurs daily. The problem has been unchanged. Associated symptoms include arthralgias. Pertinent negatives include no anorexia, change in bowel habit, chest pain, chills, coughing, diaphoresis, fever, neck pain, sore throat, swollen glands, urinary symptoms, vertigo or vomiting.   Review of Systems   Constitutional:  Negative for activity change, appetite change, chills, diaphoresis, fever and unexpected weight change.   HENT:  Negative for drooling, ear discharge, ear pain, facial swelling, nosebleeds, sore throat, trouble swallowing, voice change and goiter.    Eyes:  Negative for photophobia, pain, discharge, redness and visual disturbance.   Respiratory:  Negative for apnea, cough, choking, chest tightness, shortness of breath, wheezing and stridor.    Cardiovascular:  Negative for chest pain, palpitations and leg swelling.   Gastrointestinal:  Negative for abdominal distention, anorexia, change in bowel habit, diarrhea, rectal pain, vomiting, fecal incontinence and change in bowel habit.   Endocrine: Negative for cold intolerance, heat intolerance, polydipsia, polyphagia and polyuria.   Genitourinary:  Negative for bladder incontinence, dysuria, flank pain, frequency and hot flashes.   Musculoskeletal:  Positive for arthralgias, back pain and leg pain. Negative for neck pain.   Integumentary:  Negative for color change and pallor.   Allergic/Immunologic: Negative for immunocompromised state.   Neurological:  Negative for dizziness, vertigo, seizures, syncope, facial asymmetry, speech difficulty, light-headedness, coordination difficulties, memory loss and coordination difficulties.   Hematological:  Negative for adenopathy. Does not bruise/bleed easily.   Psychiatric/Behavioral:  Negative for agitation,  behavioral problems, confusion, decreased concentration, dysphoric mood, hallucinations, self-injury and suicidal ideas. The patient is not nervous/anxious and is not hyperactive.          Past Medical History:   Diagnosis Date    Amenorrhea 2014    Started to regulate after 2years on depo (2019)    Anxiety 2020    Celiac disease     Chlamydia infection     Depression 2020    Dyspareunia 2020    Endometriosis, herpes, birth control, cysts    Endometriosis     Heart murmur 2017    Benign    Herpes simplex virus (HSV) infection     Mental disorder     anexity and depression    Ovarian cyst     Scoliosis     STD (sexually transmitted disease) 2020    Herpes     Past Surgical History:   Procedure Laterality Date    ENDOMETRIAL ABLATION  2016    HYSTEROSCOPY      PELVIC LAPAROSCOPY  2022    SPINAL FUSION  10/03/2017    SURGICAL REMOVAL OF ENDOMETRIOSIS  2014    TONSILLECTOMY  2014     Social History     Socioeconomic History    Marital status: Single   Tobacco Use    Smoking status: Never    Smokeless tobacco: Never   Substance and Sexual Activity    Alcohol use: Never    Drug use: Not Currently     Types: Marijuana     Comment: every couple of days    Sexual activity: Yes     Partners: Male     Birth control/protection: Condom, Rhythm, None     Comment: Last sexual intercourse 9/9/2021     Family History   Problem Relation Age of Onset    Lupus Paternal Grandmother     Ovarian cancer Maternal Grandmother     Cancer Maternal Grandmother         Ovarian cancer    Diabetes Maternal Grandfather     Heart failure Maternal Grandfather     Breast cancer Maternal Aunt         Breast cancer reoccurring    Cancer Maternal Aunt     Cancer Maternal Uncle     Cancer Paternal Aunt     Diabetes Paternal Aunt         Diabetes    Heart failure Father     Cancer Maternal Aunt         Breast cancer reoccurring    Heart failure Maternal Aunt      Review of patient's allergies indicates:   Allergen  "Reactions    Codeine     Oxycodone Itching        Objective:  Vitals:    06/26/23 1358   BP: (!) 121/59   Pulse: 81   Resp: 18   Weight: 60.8 kg (134 lb)   Height: 5' 6" (1.676 m)   PainSc:   8         Physical Exam  Vitals and nursing note reviewed. Exam conducted with a chaperone present.   Constitutional:       General: She is awake. She is not in acute distress.     Appearance: Normal appearance. She is not ill-appearing, toxic-appearing or diaphoretic.   HENT:      Head: Normocephalic and atraumatic.      Nose: Nose normal.      Mouth/Throat:      Mouth: Mucous membranes are moist.      Pharynx: Oropharynx is clear.   Eyes:      Conjunctiva/sclera: Conjunctivae normal.      Pupils: Pupils are equal, round, and reactive to light.   Cardiovascular:      Rate and Rhythm: Normal rate.   Pulmonary:      Effort: Pulmonary effort is normal. No respiratory distress.   Abdominal:      Palpations: Abdomen is soft.      Tenderness: There is no guarding.   Musculoskeletal:         General: Normal range of motion.      Cervical back: Normal range of motion and neck supple. No rigidity.   Skin:     General: Skin is warm and dry.      Coloration: Skin is not jaundiced or pale.   Neurological:      General: No focal deficit present.      Mental Status: She is alert and oriented to person, place, and time. Mental status is at baseline.      Cranial Nerves: No cranial nerve deficit (II-XII).   Psychiatric:         Mood and Affect: Mood normal.         Behavior: Behavior normal. Behavior is cooperative.         Thought Content: Thought content normal.         US Pelvis Complete Non OB  Narrative: EXAMINATION:  US PELVIS COMPLETE NON OB    CLINICAL HISTORY:  Personal history of other diseases of the female genital tract    TECHNIQUE:  Transabdominal grayscale and color Doppler ultrasound performed of the uterus and adnexa.  Ultrasound images captured and stored.    COMPARISON:  07/18/2022    FINDINGS:  Uterus appears normal " measuring 7.3 x 3.6 x 4.8 cm.  Endometrial stripe 1 cm.  Right ovary is normal.  There is a simple cyst left ovary measuring 3 x 1.7 x 3.3 cm.  Small amount of free fluid in the cul-de-sac.  Impression: Simple appearing left ovarian cyst.    Electronically signed by: Angel Conroy  Date:    03/24/2023  Time:    16:19       Lab Visit on 05/11/2023   Component Date Value Ref Range Status    Pregnancy, Serum 05/11/2023 Negative  Negative, QNS Final    WBC 05/11/2023 6.28  4.50 - 11.00 K/uL Final    RBC 05/11/2023 4.98  4.20 - 5.40 M/uL Final    Hemoglobin 05/11/2023 14.8  12.0 - 16.0 g/dL Final    Hematocrit 05/11/2023 47.5 (H)  38.0 - 47.0 % Final    MCV 05/11/2023 95.4  80.0 - 96.0 fL Final    MCH 05/11/2023 29.7  27.0 - 31.0 pg Final    MCHC 05/11/2023 31.2 (L)  32.0 - 36.0 g/dL Final    RDW 05/11/2023 14.2  11.5 - 14.5 % Final    Platelet Count 05/11/2023 305  150 - 400 K/uL Final    MPV 05/11/2023 10.3  9.4 - 12.4 fL Final    Neutrophils % 05/11/2023 42.4 (L)  53.0 - 65.0 % Final    Lymphocytes % 05/11/2023 44.9 (H)  27.0 - 41.0 % Final    Monocytes % 05/11/2023 9.9 (H)  2.0 - 6.0 % Final    Eosinophils % 05/11/2023 2.1  1.0 - 4.0 % Final    Basophils % 05/11/2023 0.5  0.0 - 1.0 % Final    Immature Granulocytes % 05/11/2023 0.2  0.0 - 0.4 % Final    nRBC, Auto 05/11/2023 0.0  <=0.0 % Final    Neutrophils, Abs 05/11/2023 2.67  1.80 - 7.70 K/uL Final    Lymphocytes, Absolute 05/11/2023 2.82  1.00 - 4.80 K/uL Final    Monocytes, Absolute 05/11/2023 0.62  0.00 - 0.80 K/uL Final    Eosinophils, Absolute 05/11/2023 0.13  0.00 - 0.50 K/uL Final    Basophils, Absolute 05/11/2023 0.03  0.00 - 0.20 K/uL Final    Immature Granulocytes, Absolute 05/11/2023 0.01  0.00 - 0.04 K/uL Final    nRBC, Absolute 05/11/2023 0.00  <=0.00 x10e3/uL Final    Diff Type 05/11/2023 Auto   Final   Office Visit on 05/11/2023   Component Date Value Ref Range Status    Case Report 05/11/2023    Final                     Value:Pap Cytology                                      Case: [id number]                                   Authorizing Provider:  Eladio Coyle MD      Collected:           05/11/2023 10:59 AM          Ordering Location:     [facility] Received:            05/12/2023 08:19 AM                                 - Obstetrics And                                                                                    Gynecology                                                                   First Screen:          SAMMY Wilson(ASCP)                                                    Pathologist:           Demario Foster III, MD                                                                           Specimen:    Liquid-Based Pap Test, Screening, Endocervix                                               Interpretation 05/11/2023 Atypical Squamous Cells of Undetermined Significance (A)              Final    General Categorization 05/11/2023 Epithelial Cell Abnormality   Final    Specimen Adequacy 05/11/2023 Satisfactory for evaluation   Final    Clinical Information 05/11/2023    Final                    Value:This result contains rich text formatting which cannot be displayed here.    Disclaimer 05/11/2023    Final                    Value:This result contains rich text formatting which cannot be displayed here.    HPV 16 05/11/2023 Negative  Negative, Invalid Final    HPV 18 05/11/2023 Negative  Negative, Invalid Final    HPV Other 05/11/2023 Positive (A)  Negative, Invalid Final   Office Visit on 04/27/2023   Component Date Value Ref Range Status    Case Report 04/27/2023    Final                    Value:Pap Cytology                                      Case: [id number]                                   Authorizing Provider:  Naheed Peacock CNM        Collected:           04/27/2023 11:57 AM          Ordering  Location:     [facility]       Received:            04/27/2023 11:57 AM                                 Clinic - Obstetrics and                                                                             Gynecology                                                                   First Screen:          SAMMY Up(ASCP)                                                        Pathologist:           Eulalio Zhang MD                                                       Specimen:    Liquid-Based Pap Test, Screening, Cervix                                                   Interpretation 04/27/2023 Low Grade Squamous Intraepithelial Lesion (A)              Final    General Categorization 04/27/2023 Epithelial Cell Abnormality   Final    Specimen Adequacy 04/27/2023 Satisfactory for evaluation   Final    Clinical Information 04/27/2023    Final                    Value:This result contains rich text formatting which cannot be displayed here.    Disclaimer 04/27/2023    Final                    Value:This result contains rich text formatting which cannot be displayed here.   Office Visit on 03/21/2023   Component Date Value Ref Range Status    WBC 03/21/2023 6.53  4.50 - 11.00 K/uL Final    RBC 03/21/2023 4.52  4.20 - 5.40 M/uL Final    Hemoglobin 03/21/2023 13.5  12.0 - 16.0 g/dL Final    Hematocrit 03/21/2023 42.0  38.0 - 47.0 % Final    MCV 03/21/2023 92.9  80.0 - 96.0 fL Final    MCH 03/21/2023 29.9  27.0 - 31.0 pg Final    MCHC 03/21/2023 32.1  32.0 - 36.0 g/dL Final    RDW 03/21/2023 14.6 (H)  11.5 - 14.5 % Final    Platelet Count 03/21/2023 349  150 - 400 K/uL Final    MPV 03/21/2023 9.2 (L)  9.4 - 12.4 fL Final    Neutrophils % 03/21/2023 51.3 (L)  53.0 - 65.0 % Final    Lymphocytes % 03/21/2023 38.3  27.0 - 41.0 % Final    Monocytes % 03/21/2023 8.1 (H)  2.0 - 6.0 % Final    Eosinophils % 03/21/2023 1.8  1.0 - 4.0 % Final    Basophils % 03/21/2023 0.3  0.0 - 1.0 % Final     Immature Granulocytes % 03/21/2023 0.2  0.0 - 0.4 % Final    nRBC, Auto 03/21/2023 0.0  <=0.0 % Final    Neutrophils, Abs 03/21/2023 3.35  1.80 - 7.70 K/uL Final    Lymphocytes, Absolute 03/21/2023 2.50  1.00 - 4.80 K/uL Final    Monocytes, Absolute 03/21/2023 0.53  0.00 - 0.80 K/uL Final    Eosinophils, Absolute 03/21/2023 0.12  0.00 - 0.50 K/uL Final    Basophils, Absolute 03/21/2023 0.02  0.00 - 0.20 K/uL Final    Immature Granulocytes, Absolute 03/21/2023 0.01  0.00 - 0.04 K/uL Final    nRBC, Absolute 03/21/2023 0.00  <=0.00 x10e3/uL Final    Diff Type 03/21/2023 Auto   Final   Office Visit on 03/08/2023   Component Date Value Ref Range Status    Chlamydia by PCR 03/08/2023 Negative  Negative, Invalid Final    N. gonorrhoeae (GC) by PCR 03/08/2023 Negative  Negative, Invalid Final    Candida Species 03/08/2023 Negative  Negative, Invalid Final    Gardnerella 03/08/2023 Positive (A)  Negative, Invalid Final    Trichomonas 03/08/2023 Negative  Negative, Invalid Final         No orders of the defined types were placed in this encounter.      Requested Prescriptions     Signed Prescriptions Disp Refills    pregabalin (LYRICA) 50 MG capsule 60 capsule 2     Sig: Take 1 capsule (50 mg total) by mouth 3 (three) times daily.    amitriptyline (ELAVIL) 25 MG tablet 90 tablet 2     Sig: Take 1 tablet (25 mg total) by mouth nightly.    methocarbamoL (ROBAXIN) 500 MG Tab 90 tablet 2     Sig: Take 1 tablet (500 mg total) by mouth 3 (three) times daily as needed (Muscle spasm).       Assessment:     1. Scoliosis of lumbar spine, unspecified scoliosis type    2. Thoracic spine pain    3. Chronic bilateral low back pain without sciatica         A's of Opioid Risk Assessment  Activity:Patient can perform ADL.   Analgesia:Patients pain is partially controlled by current medication. Patient has tried OTC medications such as Tylenol and Ibuprofen with out relief.   Adverse Effects: Patient denies constipation or  sedation.  Aberrant Behavior:  reviewed with no aberrant drug seeking/taking behavior.  Overdose reversal drug naloxone discussed    Drug screen reviewed      Plan:     Not using narcotics from our office    History of Falcon gilles placement October 2021 subsequent explant of hardware due to pain    Requesting to discontinue cyclobenzaprine not helping     She would like to resume methocarbamol    Methocarbamol 500 mg 1 p.o. q.8 hours as needed     She would like to continue conservative management     Follow-up 3 months     Dr. Luciano, March 2024    Bring original prescription medication bottles/container/box with labels to each visit

## 2023-07-10 ENCOUNTER — PROCEDURE VISIT (OUTPATIENT)
Dept: OBSTETRICS AND GYNECOLOGY | Facility: CLINIC | Age: 23
End: 2023-07-10

## 2023-07-10 VITALS — DIASTOLIC BLOOD PRESSURE: 60 MMHG | SYSTOLIC BLOOD PRESSURE: 118 MMHG

## 2023-07-10 DIAGNOSIS — R87.618 PAP SMEAR ABNORMALITY OF CERVIX/HUMAN PAPILLOMAVIRUS (HPV) POSITIVE: ICD-10-CM

## 2023-07-10 PROCEDURE — 57452 COLPOSCOPY: ICD-10-PCS | Mod: S$PBB,,, | Performed by: OBSTETRICS & GYNECOLOGY

## 2023-07-10 PROCEDURE — 57452 EXAM OF CERVIX W/SCOPE: CPT | Mod: PBBFAC | Performed by: OBSTETRICS & GYNECOLOGY

## 2023-07-10 NOTE — PROCEDURES
Colposcopy    Date/Time: 7/10/2023 3:00 PM  Performed by: Eladio Coyle MD  Authorized by: Eladio Coyle MD     Consent Done?:  Yes (Written)  Timeout:Immediately prior to procedure a time out was called to verify the correct patient, procedure, equipment, support staff and site/side marked as required  Prep:Patient was prepped and draped in the usual sterile fashion  Assistants?: Yes      Colposcopy Site:  Cervix  Acrowhite Lesion: No    Atypical Vessels: No    Transformation Zone Adequate?: Yes    Biopsy?: No    ECC Performed?: No    LEEP Performed?: No       Cervix visualized.  5% ascetic acid placed.  Cervix very smooth.  Thorough colposcopic exam was performed.  The squamocolumnar junction was seen 360°.  At 12:00 p.m. position there was faint aceto-white area of approximally 3-4 mm which appeared to be metaplasia rather than dysplasia.  There were no abnormal cells there was no punctation.      There were no abnormal cells seen with in the endocervical canal no aceto-white areas.    I felt that at most this may be metaplasia versus mild dysplasia.  Therefore biopsies were not taken.

## 2023-07-10 NOTE — PATIENT INSTRUCTIONS
I recommend follow-up in 8 months for repeat Pap and HPV testing.    She will continue with oral contraceptives    Also recommended condoms     Also have strongly recommended HPV vaccination series.  Recommended [facility].

## 2023-08-12 ENCOUNTER — PATIENT MESSAGE (OUTPATIENT)
Dept: OBSTETRICS AND GYNECOLOGY | Facility: CLINIC | Age: 23
End: 2023-08-12
Payer: COMMERCIAL

## 2023-08-12 DIAGNOSIS — Z30.9 ENCOUNTER FOR CONTRACEPTIVE MANAGEMENT, UNSPECIFIED TYPE: ICD-10-CM

## 2023-08-14 DIAGNOSIS — Z30.9 ENCOUNTER FOR CONTRACEPTIVE MANAGEMENT, UNSPECIFIED TYPE: Primary | ICD-10-CM

## 2023-08-14 RX ORDER — ACETAMINOPHEN AND CODEINE PHOSPHATE 120; 12 MG/5ML; MG/5ML
1 SOLUTION ORAL DAILY
Qty: 30 TABLET | Refills: 5 | Status: SHIPPED | OUTPATIENT
Start: 2023-08-14 | End: 2023-08-31 | Stop reason: SDUPTHER

## 2023-08-14 RX ORDER — NORETHINDRONE ACETATE AND ETHINYL ESTRADIOL 1.5-30(21)
1 KIT ORAL DAILY
Qty: 30 TABLET | Refills: 2 | Status: SHIPPED | OUTPATIENT
Start: 2023-08-14 | End: 2024-02-08

## 2023-08-15 ENCOUNTER — PATIENT MESSAGE (OUTPATIENT)
Dept: ADMINISTRATIVE | Facility: HOSPITAL | Age: 23
End: 2023-08-15
Payer: COMMERCIAL

## 2023-08-16 ENCOUNTER — OFFICE VISIT (OUTPATIENT)
Dept: OBSTETRICS AND GYNECOLOGY | Facility: CLINIC | Age: 23
End: 2023-08-16
Payer: COMMERCIAL

## 2023-08-16 ENCOUNTER — TELEPHONE (OUTPATIENT)
Dept: OBSTETRICS AND GYNECOLOGY | Facility: CLINIC | Age: 23
End: 2023-08-16
Payer: COMMERCIAL

## 2023-08-16 VITALS
SYSTOLIC BLOOD PRESSURE: 114 MMHG | BODY MASS INDEX: 22.08 KG/M2 | WEIGHT: 137.38 LBS | DIASTOLIC BLOOD PRESSURE: 60 MMHG | HEIGHT: 66 IN

## 2023-08-16 DIAGNOSIS — B97.7 HPV IN FEMALE: Primary | ICD-10-CM

## 2023-08-16 PROCEDURE — 99499 NO LOS: ICD-10-PCS | Mod: S$PBB,,, | Performed by: OBSTETRICS & GYNECOLOGY

## 2023-08-16 PROCEDURE — 99213 OFFICE O/P EST LOW 20 MIN: CPT | Mod: PBBFAC | Performed by: OBSTETRICS & GYNECOLOGY

## 2023-08-16 PROCEDURE — 99499 UNLISTED E&M SERVICE: CPT | Mod: S$PBB,,, | Performed by: OBSTETRICS & GYNECOLOGY

## 2023-08-16 NOTE — PROGRESS NOTES
Subjective:       Patient ID: Kd Joshua is a 23 y.o. female.    Chief Complaint: Follow-up (Here for f/u after colpo done 7/10/23,  and has questions about birth control.)    Presents today for discussion only.  Follow-up of colposcopic exam done July 2023.      No biopsies were taken.  Minimal findings.      Previous Pap done by me May 2023 revealed ASCUS with positive other HPV.      On previous visit she was encouraged to have HPV vaccination.  However upon review of records by patient at the [facility] she is already had HPV vaccination.     She was previously on progestin only oral contraceptives.  She is now on Loestrin 1.5/30 combination pill.  She will notify me if she has any worsening dysmenorrhea or menorrhagia.                Follow-up    Review of Systems      Objective:      Physical Exam    Assessment:       1. HPV in female        Plan:       Patient Instructions   Discussed follow-up with me in March 2024.     We will repeat Pap and follow-up with repeat colposcopic exam if necessary.

## 2023-08-16 NOTE — PATIENT INSTRUCTIONS
Discussed follow-up with me in March 2024.     We will repeat Pap and follow-up with repeat colposcopic exam if necessary.

## 2023-08-16 NOTE — TELEPHONE ENCOUNTER
Discussed photographs sent to me on patient message was a progestin only oral contraceptive.  However she is presently on combination oral contraceptive and I instructed her to continue this oral contraceptive.    She will notify me if any worsening dysmenorrhea menorrhagia.  Otherwise follow-up with me in March 2024.

## 2023-08-31 ENCOUNTER — OFFICE VISIT (OUTPATIENT)
Dept: OBSTETRICS AND GYNECOLOGY | Facility: CLINIC | Age: 23
End: 2023-08-31
Payer: COMMERCIAL

## 2023-08-31 VITALS — SYSTOLIC BLOOD PRESSURE: 112 MMHG | DIASTOLIC BLOOD PRESSURE: 60 MMHG

## 2023-08-31 DIAGNOSIS — Z30.9 ENCOUNTER FOR CONTRACEPTIVE MANAGEMENT, UNSPECIFIED TYPE: ICD-10-CM

## 2023-08-31 DIAGNOSIS — N89.8 VAGINAL DISCHARGE: Primary | ICD-10-CM

## 2023-08-31 PROCEDURE — 3074F SYST BP LT 130 MM HG: CPT | Mod: CPTII,,, | Performed by: OBSTETRICS & GYNECOLOGY

## 2023-08-31 PROCEDURE — 3078F DIAST BP <80 MM HG: CPT | Mod: CPTII,,, | Performed by: OBSTETRICS & GYNECOLOGY

## 2023-08-31 PROCEDURE — 3074F PR MOST RECENT SYSTOLIC BLOOD PRESSURE < 130 MM HG: ICD-10-PCS | Mod: CPTII,,, | Performed by: OBSTETRICS & GYNECOLOGY

## 2023-08-31 PROCEDURE — 99213 OFFICE O/P EST LOW 20 MIN: CPT | Mod: PBBFAC | Performed by: OBSTETRICS & GYNECOLOGY

## 2023-08-31 PROCEDURE — 3078F PR MOST RECENT DIASTOLIC BLOOD PRESSURE < 80 MM HG: ICD-10-PCS | Mod: CPTII,,, | Performed by: OBSTETRICS & GYNECOLOGY

## 2023-08-31 PROCEDURE — 99212 PR OFFICE/OUTPT VISIT, EST, LEVL II, 10-19 MIN: ICD-10-PCS | Mod: S$PBB,,, | Performed by: OBSTETRICS & GYNECOLOGY

## 2023-08-31 PROCEDURE — 99212 OFFICE O/P EST SF 10 MIN: CPT | Mod: S$PBB,,, | Performed by: OBSTETRICS & GYNECOLOGY

## 2023-08-31 RX ORDER — TERCONAZOLE 8 MG/G
1 CREAM VAGINAL NIGHTLY
Qty: 20 G | Refills: 1 | Status: SHIPPED | OUTPATIENT
Start: 2023-08-31

## 2023-08-31 RX ORDER — ACETAMINOPHEN AND CODEINE PHOSPHATE 120; 12 MG/5ML; MG/5ML
1 SOLUTION ORAL DAILY
Qty: 30 TABLET | Refills: 10 | Status: SHIPPED | OUTPATIENT
Start: 2023-08-31 | End: 2024-01-18 | Stop reason: SDUPTHER

## 2023-08-31 NOTE — PROGRESS NOTES
Subjective:       Patient ID: Kd Joshua is a 23 y.o. female.    Chief Complaint: Pelvic Pain (Pt presents increasing nausea/lower right side ovarian pain since birth control change, wants to go back on progesterone pill only, also thinks she might have a yeast infection. )    Presents today complaining of possible yeast infection.    Vaginal discharge with itching.      In addition she has tried combination oral contraceptives.  However these cause nausea.  She would like to return to progestin only oral contraceptives.    Pelvic Pain  The patient's primary symptoms include pelvic pain.           Objective:      Physical Exam  Genitourinary:     Comments: External normal vault reveals whitish discharge slightly clumping.  Bacterial vaginosis probe taken.  Bimanual exam unremarkable.        Assessment:       1. Vaginal discharge    2. Encounter for contraceptive management, unspecified type        Plan:       Patient Instructions   Discussed Terazol 3 vaginal cream x3 nights.      Discussed returning to progestin only oral contraceptives.  She will finish out present pack of oral contraceptives.  Shehas not missed menses.

## 2023-08-31 NOTE — PATIENT INSTRUCTIONS
Discussed Terazol 3 vaginal cream x3 nights.      Discussed returning to progestin only oral contraceptives.  She will finish out present pack of oral contraceptives.  Shehas not missed menses.

## 2023-09-05 PROBLEM — R52 PAIN: Status: RESOLVED | Noted: 2022-10-04 | Resolved: 2023-09-05

## 2023-09-06 ENCOUNTER — OFFICE VISIT (OUTPATIENT)
Dept: PAIN MEDICINE | Facility: CLINIC | Age: 23
End: 2023-09-06
Payer: COMMERCIAL

## 2023-09-06 VITALS
HEIGHT: 66 IN | HEART RATE: 72 BPM | SYSTOLIC BLOOD PRESSURE: 121 MMHG | DIASTOLIC BLOOD PRESSURE: 73 MMHG | BODY MASS INDEX: 22.02 KG/M2 | WEIGHT: 137 LBS | RESPIRATION RATE: 16 BRPM

## 2023-09-06 DIAGNOSIS — M41.9 SCOLIOSIS OF LUMBAR SPINE, UNSPECIFIED SCOLIOSIS TYPE: Primary | Chronic | ICD-10-CM

## 2023-09-06 DIAGNOSIS — G89.29 CHRONIC BILATERAL LOW BACK PAIN WITHOUT SCIATICA: Chronic | ICD-10-CM

## 2023-09-06 DIAGNOSIS — R11.0 NAUSEA: ICD-10-CM

## 2023-09-06 DIAGNOSIS — M54.6 THORACIC SPINE PAIN: Chronic | ICD-10-CM

## 2023-09-06 DIAGNOSIS — M54.50 CHRONIC BILATERAL LOW BACK PAIN WITHOUT SCIATICA: Chronic | ICD-10-CM

## 2023-09-06 PROCEDURE — 3078F DIAST BP <80 MM HG: CPT | Mod: CPTII,,, | Performed by: PHYSICIAN ASSISTANT

## 2023-09-06 PROCEDURE — 99999PBSHW PR PBB SHADOW TECHNICAL ONLY FILED TO HB: ICD-10-PCS | Mod: PBBFAC,,,

## 2023-09-06 PROCEDURE — 96372 THER/PROPH/DIAG INJ SC/IM: CPT | Mod: PBBFAC | Performed by: PHYSICIAN ASSISTANT

## 2023-09-06 PROCEDURE — 99214 OFFICE O/P EST MOD 30 MIN: CPT | Mod: S$PBB,25,, | Performed by: PHYSICIAN ASSISTANT

## 2023-09-06 PROCEDURE — 3008F BODY MASS INDEX DOCD: CPT | Mod: CPTII,,, | Performed by: PHYSICIAN ASSISTANT

## 2023-09-06 PROCEDURE — 99214 PR OFFICE/OUTPT VISIT, EST, LEVL IV, 30-39 MIN: ICD-10-PCS | Mod: S$PBB,25,, | Performed by: PHYSICIAN ASSISTANT

## 2023-09-06 PROCEDURE — 99999PBSHW PR PBB SHADOW TECHNICAL ONLY FILED TO HB: Mod: PBBFAC,,,

## 2023-09-06 PROCEDURE — 3008F PR BODY MASS INDEX (BMI) DOCUMENTED: ICD-10-PCS | Mod: CPTII,,, | Performed by: PHYSICIAN ASSISTANT

## 2023-09-06 PROCEDURE — 99214 OFFICE O/P EST MOD 30 MIN: CPT | Mod: PBBFAC,25 | Performed by: PHYSICIAN ASSISTANT

## 2023-09-06 PROCEDURE — 3074F PR MOST RECENT SYSTOLIC BLOOD PRESSURE < 130 MM HG: ICD-10-PCS | Mod: CPTII,,, | Performed by: PHYSICIAN ASSISTANT

## 2023-09-06 PROCEDURE — 3074F SYST BP LT 130 MM HG: CPT | Mod: CPTII,,, | Performed by: PHYSICIAN ASSISTANT

## 2023-09-06 PROCEDURE — 1159F PR MEDICATION LIST DOCUMENTED IN MEDICAL RECORD: ICD-10-PCS | Mod: CPTII,,, | Performed by: PHYSICIAN ASSISTANT

## 2023-09-06 PROCEDURE — 1159F MED LIST DOCD IN RCRD: CPT | Mod: CPTII,,, | Performed by: PHYSICIAN ASSISTANT

## 2023-09-06 PROCEDURE — 3078F PR MOST RECENT DIASTOLIC BLOOD PRESSURE < 80 MM HG: ICD-10-PCS | Mod: CPTII,,, | Performed by: PHYSICIAN ASSISTANT

## 2023-09-06 RX ORDER — METHOCARBAMOL 500 MG/1
500 TABLET, FILM COATED ORAL 3 TIMES DAILY PRN
Qty: 90 TABLET | Refills: 2 | Status: SHIPPED | OUTPATIENT
Start: 2023-09-06 | End: 2023-10-19 | Stop reason: SDUPTHER

## 2023-09-06 RX ORDER — PREGABALIN 50 MG/1
50 CAPSULE ORAL 3 TIMES DAILY
Qty: 60 CAPSULE | Refills: 2 | Status: SHIPPED | OUTPATIENT
Start: 2023-09-06 | End: 2023-10-19 | Stop reason: SDUPTHER

## 2023-09-06 RX ORDER — KETOROLAC TROMETHAMINE 30 MG/ML
60 INJECTION, SOLUTION INTRAMUSCULAR; INTRAVENOUS
Status: COMPLETED | OUTPATIENT
Start: 2023-09-06 | End: 2023-09-06

## 2023-09-06 RX ORDER — AMITRIPTYLINE HYDROCHLORIDE 25 MG/1
25 TABLET, FILM COATED ORAL NIGHTLY
Qty: 90 TABLET | Refills: 2 | Status: SHIPPED | OUTPATIENT
Start: 2023-09-06 | End: 2023-10-19 | Stop reason: SDUPTHER

## 2023-09-06 RX ORDER — ONDANSETRON 4 MG/1
4 TABLET, ORALLY DISINTEGRATING ORAL 2 TIMES DAILY
Qty: 30 TABLET | Refills: 0 | Status: SHIPPED | OUTPATIENT
Start: 2023-09-06

## 2023-09-06 RX ORDER — CYCLOBENZAPRINE HCL 10 MG
10 TABLET ORAL 3 TIMES DAILY PRN
Qty: 90 TABLET | Refills: 2 | Status: SHIPPED | OUTPATIENT
Start: 2023-09-06 | End: 2023-10-19 | Stop reason: SDUPTHER

## 2023-09-06 RX ADMIN — KETOROLAC TROMETHAMINE 60 MG: 60 INJECTION, SOLUTION INTRAMUSCULAR at 01:09

## 2023-09-06 NOTE — PROGRESS NOTES
Subjective:         Patient ID: Kd Joshua is a 23 y.o. female.    Chief Complaint: Mid-back Pain and Low-back Pain      Pain  This is a chronic problem. The current episode started more than 1 year ago. The problem occurs daily. The problem has been waxing and waning. Associated symptoms include arthralgias. Pertinent negatives include no anorexia, change in bowel habit, chest pain, chills, coughing, diaphoresis, fever, neck pain, sore throat, swollen glands, urinary symptoms, vertigo or vomiting.     Review of Systems   Constitutional:  Negative for activity change, appetite change, chills, diaphoresis, fever and unexpected weight change.   HENT:  Negative for drooling, ear discharge, ear pain, facial swelling, nosebleeds, sore throat, trouble swallowing, voice change and goiter.    Eyes:  Negative for photophobia, pain, discharge, redness and visual disturbance.   Respiratory:  Negative for apnea, cough, choking, chest tightness, shortness of breath, wheezing and stridor.    Cardiovascular:  Negative for chest pain, palpitations and leg swelling.   Gastrointestinal:  Negative for abdominal distention, anorexia, change in bowel habit, diarrhea, rectal pain, vomiting, fecal incontinence and change in bowel habit.   Endocrine: Negative for cold intolerance, heat intolerance, polydipsia, polyphagia and polyuria.   Genitourinary:  Negative for bladder incontinence, dysuria, flank pain, frequency and hot flashes.   Musculoskeletal:  Positive for arthralgias, back pain and leg pain. Negative for neck pain.   Integumentary:  Negative for color change and pallor.   Allergic/Immunologic: Negative for immunocompromised state.   Neurological:  Negative for dizziness, vertigo, seizures, syncope, facial asymmetry, speech difficulty, light-headedness, coordination difficulties, memory loss and coordination difficulties.   Hematological:  Negative for adenopathy. Does not bruise/bleed easily.   Psychiatric/Behavioral:   Negative for agitation, behavioral problems, confusion, decreased concentration, dysphoric mood, hallucinations, self-injury and suicidal ideas. The patient is not nervous/anxious and is not hyperactive.            Past Medical History:   Diagnosis Date    Amenorrhea 2014    Started to regulate after 2years on depo (2019)    Anxiety 2020    Celiac disease     Chlamydia infection     Depression 2020    Dyspareunia 2020    Endometriosis, herpes, birth control, cysts    Endometriosis     Heart murmur 2017    Benign    Herpes simplex virus (HSV) infection     Mental disorder     anexity and depression    Ovarian cyst     Scoliosis     STD (sexually transmitted disease) 2020    Herpes     Past Surgical History:   Procedure Laterality Date    ENDOMETRIAL ABLATION  2016    HYSTEROSCOPY      PELVIC LAPAROSCOPY  2022    SPINAL FUSION  10/03/2017    SURGICAL REMOVAL OF ENDOMETRIOSIS  2014    TONSILLECTOMY  2014     Social History     Socioeconomic History    Marital status: Single   Tobacco Use    Smoking status: Never    Smokeless tobacco: Never   Substance and Sexual Activity    Alcohol use: Never    Drug use: Not Currently     Types: Marijuana     Comment: every couple of days    Sexual activity: Yes     Partners: Male     Birth control/protection: Condom, Rhythm, None     Comment: Last sexual intercourse 9/9/2021     Family History   Problem Relation Age of Onset    Lupus Paternal Grandmother     Ovarian cancer Maternal Grandmother     Cancer Maternal Grandmother         Ovarian cancer    Diabetes Maternal Grandfather     Heart failure Maternal Grandfather     Breast cancer Maternal Aunt         Breast cancer reoccurring    Cancer Maternal Aunt     Cancer Maternal Uncle     Cancer Paternal Aunt     Diabetes Paternal Aunt         Diabetes    Heart failure Father     Cancer Maternal Aunt         Breast cancer reoccurring    Heart failure Maternal Aunt      Review of patient's allergies indicates:   Allergen Reactions     "Codeine     Oxycodone Itching        Objective:  Vitals:    09/06/23 1257   BP: 121/73   Pulse: 72   Resp: 16   Weight: 62.1 kg (137 lb)   Height: 5' 6" (1.676 m)   PainSc:   9         Physical Exam  Vitals and nursing note reviewed. Exam conducted with a chaperone present.   Constitutional:       General: She is awake. She is not in acute distress.     Appearance: Normal appearance. She is not ill-appearing, toxic-appearing or diaphoretic.   HENT:      Head: Normocephalic and atraumatic.      Nose: Nose normal.      Mouth/Throat:      Mouth: Mucous membranes are moist.      Pharynx: Oropharynx is clear.   Eyes:      Conjunctiva/sclera: Conjunctivae normal.      Pupils: Pupils are equal, round, and reactive to light.   Cardiovascular:      Rate and Rhythm: Normal rate.   Pulmonary:      Effort: Pulmonary effort is normal. No respiratory distress.   Abdominal:      Palpations: Abdomen is soft.      Tenderness: There is no guarding.   Musculoskeletal:         General: Normal range of motion.      Cervical back: Normal range of motion and neck supple. No rigidity.   Skin:     General: Skin is warm and dry.      Coloration: Skin is not jaundiced or pale.   Neurological:      General: No focal deficit present.      Mental Status: She is alert and oriented to person, place, and time. Mental status is at baseline.      Cranial Nerves: No cranial nerve deficit (II-XII).   Psychiatric:         Mood and Affect: Mood normal.         Behavior: Behavior normal. Behavior is cooperative.         Thought Content: Thought content normal.           US Pelvis Complete Non OB  Narrative: EXAMINATION:  US PELVIS COMPLETE NON OB    CLINICAL HISTORY:  Personal history of other diseases of the female genital tract    TECHNIQUE:  Transabdominal grayscale and color Doppler ultrasound performed of the uterus and adnexa.  Ultrasound images captured and stored.    COMPARISON:  07/18/2022    FINDINGS:  Uterus appears normal measuring 7.3 x 3.6 x " 4.8 cm.  Endometrial stripe 1 cm.  Right ovary is normal.  There is a simple cyst left ovary measuring 3 x 1.7 x 3.3 cm.  Small amount of free fluid in the cul-de-sac.  Impression: Simple appearing left ovarian cyst.    Electronically signed by: Angel Conroy  Date:    03/24/2023  Time:    16:19       Lab Visit on 05/11/2023   Component Date Value Ref Range Status    Pregnancy, Serum 05/11/2023 Negative  Negative, QNS Final    WBC 05/11/2023 6.28  4.50 - 11.00 K/uL Final    RBC 05/11/2023 4.98  4.20 - 5.40 M/uL Final    Hemoglobin 05/11/2023 14.8  12.0 - 16.0 g/dL Final    Hematocrit 05/11/2023 47.5 (H)  38.0 - 47.0 % Final    MCV 05/11/2023 95.4  80.0 - 96.0 fL Final    MCH 05/11/2023 29.7  27.0 - 31.0 pg Final    MCHC 05/11/2023 31.2 (L)  32.0 - 36.0 g/dL Final    RDW 05/11/2023 14.2  11.5 - 14.5 % Final    Platelet Count 05/11/2023 305  150 - 400 K/uL Final    MPV 05/11/2023 10.3  9.4 - 12.4 fL Final    Neutrophils % 05/11/2023 42.4 (L)  53.0 - 65.0 % Final    Lymphocytes % 05/11/2023 44.9 (H)  27.0 - 41.0 % Final    Monocytes % 05/11/2023 9.9 (H)  2.0 - 6.0 % Final    Eosinophils % 05/11/2023 2.1  1.0 - 4.0 % Final    Basophils % 05/11/2023 0.5  0.0 - 1.0 % Final    Immature Granulocytes % 05/11/2023 0.2  0.0 - 0.4 % Final    nRBC, Auto 05/11/2023 0.0  <=0.0 % Final    Neutrophils, Abs 05/11/2023 2.67  1.80 - 7.70 K/uL Final    Lymphocytes, Absolute 05/11/2023 2.82  1.00 - 4.80 K/uL Final    Monocytes, Absolute 05/11/2023 0.62  0.00 - 0.80 K/uL Final    Eosinophils, Absolute 05/11/2023 0.13  0.00 - 0.50 K/uL Final    Basophils, Absolute 05/11/2023 0.03  0.00 - 0.20 K/uL Final    Immature Granulocytes, Absolute 05/11/2023 0.01  0.00 - 0.04 K/uL Final    nRBC, Absolute 05/11/2023 0.00  <=0.00 x10e3/uL Final    Diff Type 05/11/2023 Auto   Final   Office Visit on 05/11/2023   Component Date Value Ref Range Status    Case Report 05/11/2023    Final                    Value:Pap Cytology                                       Case: [id number]                                   Authorizing Provider:  Eladio Coyle MD      Collected:           05/11/2023 10:59 AM          Ordering Location:     [facility] Received:            05/12/2023 08:19 AM                                 - Obstetrics And                                                                                    Gynecology                                                                   First Screen:          SAMMY Wilson(ASCP)                                                    Pathologist:           Demario Foster III, MD                                                                           Specimen:    Liquid-Based Pap Test, Screening, Endocervix                                               Interpretation 05/11/2023 Atypical Squamous Cells of Undetermined Significance (A)              Final    General Categorization 05/11/2023 Epithelial Cell Abnormality   Final    Specimen Adequacy 05/11/2023 Satisfactory for evaluation   Final    Clinical Information 05/11/2023    Final                    Value:This result contains rich text formatting which cannot be displayed here.    Disclaimer 05/11/2023    Final                    Value:This result contains rich text formatting which cannot be displayed here.    HPV 16 05/11/2023 Negative  Negative, Invalid Final    HPV 18 05/11/2023 Negative  Negative, Invalid Final    HPV Other 05/11/2023 Positive (A)  Negative, Invalid Final   Office Visit on 04/27/2023   Component Date Value Ref Range Status    Case Report 04/27/2023    Final                    Value:Pap Cytology                                      Case: [id number]                                   Authorizing Provider:  Naheed Peacock CNM        Collected:           04/27/2023 11:57 AM          Ordering Location:     [facility]       Received:             04/27/2023 11:57 AM                                 Clinic - Obstetrics and                                                                             Gynecology                                                                   First Screen:          SAMMY Up(ASCP)                                                        Pathologist:           Eulalio Zhang MD                                                       Specimen:    Liquid-Based Pap Test, Screening, Cervix                                                   Interpretation 04/27/2023 Low Grade Squamous Intraepithelial Lesion (A)              Final    General Categorization 04/27/2023 Epithelial Cell Abnormality   Final    Specimen Adequacy 04/27/2023 Satisfactory for evaluation   Final    Clinical Information 04/27/2023    Final                    Value:This result contains rich text formatting which cannot be displayed here.    Disclaimer 04/27/2023    Final                    Value:This result contains rich text formatting which cannot be displayed here.   Office Visit on 03/21/2023   Component Date Value Ref Range Status    WBC 03/21/2023 6.53  4.50 - 11.00 K/uL Final    RBC 03/21/2023 4.52  4.20 - 5.40 M/uL Final    Hemoglobin 03/21/2023 13.5  12.0 - 16.0 g/dL Final    Hematocrit 03/21/2023 42.0  38.0 - 47.0 % Final    MCV 03/21/2023 92.9  80.0 - 96.0 fL Final    MCH 03/21/2023 29.9  27.0 - 31.0 pg Final    MCHC 03/21/2023 32.1  32.0 - 36.0 g/dL Final    RDW 03/21/2023 14.6 (H)  11.5 - 14.5 % Final    Platelet Count 03/21/2023 349  150 - 400 K/uL Final    MPV 03/21/2023 9.2 (L)  9.4 - 12.4 fL Final    Neutrophils % 03/21/2023 51.3 (L)  53.0 - 65.0 % Final    Lymphocytes % 03/21/2023 38.3  27.0 - 41.0 % Final    Monocytes % 03/21/2023 8.1 (H)  2.0 - 6.0 % Final    Eosinophils % 03/21/2023 1.8  1.0 - 4.0 % Final    Basophils % 03/21/2023 0.3  0.0 - 1.0 % Final    Immature Granulocytes % 03/21/2023 0.2  0.0 - 0.4 % Final    nRBC, Auto 03/21/2023  0.0  <=0.0 % Final    Neutrophils, Abs 03/21/2023 3.35  1.80 - 7.70 K/uL Final    Lymphocytes, Absolute 03/21/2023 2.50  1.00 - 4.80 K/uL Final    Monocytes, Absolute 03/21/2023 0.53  0.00 - 0.80 K/uL Final    Eosinophils, Absolute 03/21/2023 0.12  0.00 - 0.50 K/uL Final    Basophils, Absolute 03/21/2023 0.02  0.00 - 0.20 K/uL Final    Immature Granulocytes, Absolute 03/21/2023 0.01  0.00 - 0.04 K/uL Final    nRBC, Absolute 03/21/2023 0.00  <=0.00 x10e3/uL Final    Diff Type 03/21/2023 Auto   Final   Office Visit on 03/08/2023   Component Date Value Ref Range Status    Chlamydia by PCR 03/08/2023 Negative  Negative, Invalid Final    N. gonorrhoeae (GC) by PCR 03/08/2023 Negative  Negative, Invalid Final    Candida Species 03/08/2023 Negative  Negative, Invalid Final    Gardnerella 03/08/2023 Positive (A)  Negative, Invalid Final    Trichomonas 03/08/2023 Negative  Negative, Invalid Final         No orders of the defined types were placed in this encounter.      Requested Prescriptions     Pending Prescriptions Disp Refills    pregabalin (LYRICA) 50 MG capsule 60 capsule 2     Sig: Take 1 capsule (50 mg total) by mouth 3 (three) times daily.    methocarbamoL (ROBAXIN) 500 MG Tab 90 tablet 2     Sig: Take 1 tablet (500 mg total) by mouth 3 (three) times daily as needed (Muscle spasm).    cyclobenzaprine (FLEXERIL) 10 MG tablet 90 tablet 2     Sig: Take 1 tablet (10 mg total) by mouth 3 (three) times daily as needed for Muscle spasms.    amitriptyline (ELAVIL) 25 MG tablet 90 tablet 2     Sig: Take 1 tablet (25 mg total) by mouth nightly.       Assessment:     1. Scoliosis of lumbar spine, unspecified scoliosis type    2. Thoracic spine pain    3. Chronic bilateral low back pain without sciatica         A's of Opioid Risk Assessment  Activity:Patient can perform ADL.   Analgesia:Patients pain is partially controlled by current medication. Patient has tried OTC medications such as Tylenol and Ibuprofen with out relief.    Adverse Effects: Patient denies constipation or sedation.  Aberrant Behavior:  reviewed with no aberrant drug seeking/taking behavior.  Overdose reversal drug naloxone discussed    Drug screen reviewed      Plan:    Not using narcotics from our office      History of Falcon gilles placement October 2021 subsequent explant of hardware due to pain    Requesting refill nonnarcotic medication     She states Lyrica is helping with her chronic discomfort would like to continue with conservative management    Requesting Toradol injection    Toradol 60 mg IM, tolerated well    Continue current medication    Continue home exercise program as directed    Follow-up 3 months     Dr. Luciano, March 2024    Bring original prescription medication bottles/container/box with labels to each visit

## 2023-09-07 ENCOUNTER — PATIENT MESSAGE (OUTPATIENT)
Dept: PAIN MEDICINE | Facility: CLINIC | Age: 23
End: 2023-09-07
Payer: COMMERCIAL

## 2023-09-28 ENCOUNTER — OFFICE VISIT (OUTPATIENT)
Dept: OBSTETRICS AND GYNECOLOGY | Facility: CLINIC | Age: 23
End: 2023-09-28
Payer: COMMERCIAL

## 2023-09-28 VITALS — SYSTOLIC BLOOD PRESSURE: 118 MMHG | DIASTOLIC BLOOD PRESSURE: 60 MMHG

## 2023-09-28 DIAGNOSIS — N80.C19 ENDOMETRIOSIS OF ANTERIOR ABDOMINAL WALL: Primary | ICD-10-CM

## 2023-09-28 PROCEDURE — 99212 PR OFFICE/OUTPT VISIT, EST, LEVL II, 10-19 MIN: ICD-10-PCS | Mod: S$PBB,,, | Performed by: OBSTETRICS & GYNECOLOGY

## 2023-09-28 PROCEDURE — 3074F SYST BP LT 130 MM HG: CPT | Mod: CPTII,,, | Performed by: OBSTETRICS & GYNECOLOGY

## 2023-09-28 PROCEDURE — 99212 OFFICE O/P EST SF 10 MIN: CPT | Mod: S$PBB,,, | Performed by: OBSTETRICS & GYNECOLOGY

## 2023-09-28 PROCEDURE — 3078F DIAST BP <80 MM HG: CPT | Mod: CPTII,,, | Performed by: OBSTETRICS & GYNECOLOGY

## 2023-09-28 PROCEDURE — 99213 OFFICE O/P EST LOW 20 MIN: CPT | Mod: PBBFAC | Performed by: OBSTETRICS & GYNECOLOGY

## 2023-09-28 PROCEDURE — 3074F PR MOST RECENT SYSTOLIC BLOOD PRESSURE < 130 MM HG: ICD-10-PCS | Mod: CPTII,,, | Performed by: OBSTETRICS & GYNECOLOGY

## 2023-09-28 PROCEDURE — 3078F PR MOST RECENT DIASTOLIC BLOOD PRESSURE < 80 MM HG: ICD-10-PCS | Mod: CPTII,,, | Performed by: OBSTETRICS & GYNECOLOGY

## 2023-09-28 NOTE — PROGRESS NOTES
Subjective:       Patient ID: Kd Joshua is a 23 y.o. female.    Chief Complaint: Follow-up (Pt presents c/o swelling x 2 days in lower right pelvic area. Wanted you to check area while she can see it. )    Previously patient had complained of swelling in the right mons pubis area.      Interestingly she had laparoscopic exam done at age 16 and was told she had endometriosis.      Previously I had her on the combination oral contraceptives however this made intermittent swelling in the mons pubis area worse.  She is now been on progestin only oral contraceptives for 1 month.      Patient has had her menses within the last week.  During the onset of menses she noted a swelling in the right mons pubis area and actually video this in detail.  I have reviewed her video today.  There was obvious swelling in the right mons pubis area.  However she finished her menses today in swelling has resolved.  On examination there was no fluctuance noted as was previously noted on video.    Follow-up      Review of Systems      Objective:      Physical Exam  Genitourinary:     Comments: No swelling noted in mons pubis area or groin.              Assessment:       1. Endometriosis of anterior abdominal wall        Plan:       Patient Instructions   Discussed with patient she is done an excellent job in video in this problem.      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      My recommendation now is to continue progestin only oral contraceptives.  However if symptoms persist we have discussed the possibility of excision and drainage during episode of swelling    She will continue progestin only oral contraceptives follow-up with me in 4 months

## 2023-09-28 NOTE — PATIENT INSTRUCTIONS
Discussed with patient she is done an excellent job in recording on video  this problem.      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      My recommendation now is to continue progestin only oral contraceptives.  However if symptoms persist we have discussed the possibility of excision and drainage during episode of swelling    She will continue progestin only oral contraceptives follow-up with me in 4 months

## 2023-10-19 ENCOUNTER — OFFICE VISIT (OUTPATIENT)
Dept: OBSTETRICS AND GYNECOLOGY | Facility: CLINIC | Age: 23
End: 2023-10-19
Payer: COMMERCIAL

## 2023-10-19 VITALS — SYSTOLIC BLOOD PRESSURE: 120 MMHG | DIASTOLIC BLOOD PRESSURE: 74 MMHG

## 2023-10-19 DIAGNOSIS — N80.9 ENDOMETRIOSIS: Primary | ICD-10-CM

## 2023-10-19 PROCEDURE — 99213 OFFICE O/P EST LOW 20 MIN: CPT | Mod: PBBFAC | Performed by: OBSTETRICS & GYNECOLOGY

## 2023-10-19 PROCEDURE — 99499 UNLISTED E&M SERVICE: CPT | Mod: S$PBB,,, | Performed by: OBSTETRICS & GYNECOLOGY

## 2023-10-19 PROCEDURE — 99499 NO LOS: ICD-10-PCS | Mod: S$PBB,,, | Performed by: OBSTETRICS & GYNECOLOGY

## 2023-10-19 NOTE — PATIENT INSTRUCTIONS
We discussed continuing progestin only oral contraceptives.    She has a follow-up appointment in January 2024.      We have discussed if significant swelling occurs with each menses we will explore area for possible endometriosis.    However it is much improved on 1st course of progestin only oral contraceptives.

## 2023-10-19 NOTE — PROGRESS NOTES
Subjective:       Patient ID: Kd Joshua is a 23 y.o. female.    Chief Complaint: Follow-up (Here for 3 week f/u-started menses yesterday and is having the swelling she want you to see (see previous noteA))    Patient now on menses.  She presented to me for re-examination of right mons pubis area.  This is the area of swelling during menses.  Previously demonstrated on photographs.    She is now on progestin only oral contraceptives.  Interestingly with this menses there is much less swelling.    Follow-up    Review of Systems      Objective:      Physical Exam  Genitourinary:     Comments: On examination I can feel some slight fluctuance in the right mons pubis area.  However this was not as prominent as noted on previous photographs.        Assessment:       1. Endometriosis        Plan:       Patient Instructions   We discussed continuing progestin only oral contraceptives.    She has a follow-up appointment in January 2024.      We have discussed if significant swelling occurs with each menses we will explore area for possible endometriosis.    However it is much improved on 1st course of progestin only oral contraceptives.

## 2023-10-19 NOTE — PROGRESS NOTES
Subjective:         Patient ID: Kd Joshua is a 23 y.o. female.    Chief Complaint: Low-back Pain      Pain  This is a chronic problem. The current episode started more than 1 year ago. The problem occurs daily. The problem has been unchanged. Associated symptoms include arthralgias. Pertinent negatives include no anorexia, change in bowel habit, chest pain, chills, coughing, diaphoresis, fever, neck pain, sore throat, swollen glands, urinary symptoms, vertigo or vomiting.     Review of Systems   Constitutional:  Negative for activity change, appetite change, chills, diaphoresis, fever and unexpected weight change.   HENT:  Negative for drooling, ear discharge, ear pain, facial swelling, nosebleeds, sore throat, trouble swallowing, voice change and goiter.    Eyes:  Negative for photophobia, pain, discharge, redness and visual disturbance.   Respiratory:  Negative for apnea, cough, choking, chest tightness, shortness of breath, wheezing and stridor.    Cardiovascular:  Negative for chest pain, palpitations and leg swelling.   Gastrointestinal:  Negative for abdominal distention, anorexia, change in bowel habit, diarrhea, rectal pain, vomiting and fecal incontinence.   Endocrine: Negative for cold intolerance, heat intolerance, polydipsia, polyphagia and polyuria.   Genitourinary:  Negative for bladder incontinence, dysuria, flank pain, frequency and hot flashes.   Musculoskeletal:  Positive for arthralgias, back pain and leg pain. Negative for neck pain.   Integumentary:  Negative for color change and pallor.   Allergic/Immunologic: Negative for immunocompromised state.   Neurological:  Negative for dizziness, vertigo, seizures, syncope, facial asymmetry, speech difficulty, light-headedness, memory loss and coordination difficulties.   Hematological:  Negative for adenopathy. Does not bruise/bleed easily.   Psychiatric/Behavioral:  Negative for agitation, behavioral problems, confusion, decreased  concentration, dysphoric mood, hallucinations, self-injury and suicidal ideas. The patient is not nervous/anxious and is not hyperactive.            Past Medical History:   Diagnosis Date    Amenorrhea 2014    Started to regulate after 2years on depo (2019)    Anxiety 2020    Celiac disease     Chlamydia infection     Depression 2020    Dyspareunia 2020    Endometriosis, herpes, birth control, cysts    Endometriosis     Heart murmur 2017    Benign    Herpes simplex virus (HSV) infection     Mental disorder     anexity and depression    Ovarian cyst     Scoliosis     STD (sexually transmitted disease) 2020    Herpes     Past Surgical History:   Procedure Laterality Date    ENDOMETRIAL ABLATION  2016    HYSTEROSCOPY      PELVIC LAPAROSCOPY  2022    SPINAL FUSION  10/03/2017    SURGICAL REMOVAL OF ENDOMETRIOSIS  2014    TONSILLECTOMY  2014     Social History     Socioeconomic History    Marital status: Single   Tobacco Use    Smoking status: Never    Smokeless tobacco: Never   Substance and Sexual Activity    Alcohol use: Never    Drug use: Not Currently     Types: Marijuana     Comment: every couple of days    Sexual activity: Yes     Partners: Male     Birth control/protection: Condom, Rhythm, None     Comment: Last sexual intercourse 9/9/2021     Family History   Problem Relation Age of Onset    Lupus Paternal Grandmother     Ovarian cancer Maternal Grandmother     Cancer Maternal Grandmother         Ovarian cancer    Diabetes Maternal Grandfather     Heart failure Maternal Grandfather     Breast cancer Maternal Aunt         Breast cancer reoccurring    Cancer Maternal Aunt     Cancer Maternal Uncle     Cancer Paternal Aunt     Diabetes Paternal Aunt         Diabetes    Heart failure Father     Cancer Maternal Aunt         Breast cancer reoccurring    Heart failure Maternal Aunt      Review of patient's allergies indicates:   Allergen Reactions    Codeine     Oxycodone Itching        Objective:  Vitals:    10/24/23  "1006   BP: 108/69   Pulse: 77   Resp: 16   Weight: 62.6 kg (138 lb)   Height: 5' 6" (1.676 m)   PainSc:   8           Physical Exam  Vitals and nursing note reviewed. Exam conducted with a chaperone present.   Constitutional:       General: She is awake. She is not in acute distress.     Appearance: Normal appearance. She is not ill-appearing, toxic-appearing or diaphoretic.   HENT:      Head: Normocephalic and atraumatic.      Nose: Nose normal.      Mouth/Throat:      Mouth: Mucous membranes are moist.      Pharynx: Oropharynx is clear.   Eyes:      Conjunctiva/sclera: Conjunctivae normal.      Pupils: Pupils are equal, round, and reactive to light.   Cardiovascular:      Rate and Rhythm: Normal rate.   Pulmonary:      Effort: Pulmonary effort is normal. No respiratory distress.   Abdominal:      Palpations: Abdomen is soft.      Tenderness: There is no guarding.   Musculoskeletal:         General: Normal range of motion.      Cervical back: Normal range of motion and neck supple. No rigidity.   Skin:     General: Skin is warm and dry.      Coloration: Skin is not jaundiced or pale.   Neurological:      General: No focal deficit present.      Mental Status: She is alert and oriented to person, place, and time. Mental status is at baseline.      Cranial Nerves: No cranial nerve deficit (II-XII).   Psychiatric:         Mood and Affect: Mood normal.         Behavior: Behavior normal. Behavior is cooperative.         Thought Content: Thought content normal.           US Pelvis Complete Non OB  Narrative: EXAMINATION:  US PELVIS COMPLETE NON OB    CLINICAL HISTORY:  Personal history of other diseases of the female genital tract    TECHNIQUE:  Transabdominal grayscale and color Doppler ultrasound performed of the uterus and adnexa.  Ultrasound images captured and stored.    COMPARISON:  07/18/2022    FINDINGS:  Uterus appears normal measuring 7.3 x 3.6 x 4.8 cm.  Endometrial stripe 1 cm.  Right ovary is normal.  There " is a simple cyst left ovary measuring 3 x 1.7 x 3.3 cm.  Small amount of free fluid in the cul-de-sac.  Impression: Simple appearing left ovarian cyst.    Electronically signed by: Angel Conroy  Date:    03/24/2023  Time:    16:19       Lab Visit on 05/11/2023   Component Date Value Ref Range Status    Pregnancy, Serum 05/11/2023 Negative  Negative, QNS Final    WBC 05/11/2023 6.28  4.50 - 11.00 K/uL Final    RBC 05/11/2023 4.98  4.20 - 5.40 M/uL Final    Hemoglobin 05/11/2023 14.8  12.0 - 16.0 g/dL Final    Hematocrit 05/11/2023 47.5 (H)  38.0 - 47.0 % Final    MCV 05/11/2023 95.4  80.0 - 96.0 fL Final    MCH 05/11/2023 29.7  27.0 - 31.0 pg Final    MCHC 05/11/2023 31.2 (L)  32.0 - 36.0 g/dL Final    RDW 05/11/2023 14.2  11.5 - 14.5 % Final    Platelet Count 05/11/2023 305  150 - 400 K/uL Final    MPV 05/11/2023 10.3  9.4 - 12.4 fL Final    Neutrophils % 05/11/2023 42.4 (L)  53.0 - 65.0 % Final    Lymphocytes % 05/11/2023 44.9 (H)  27.0 - 41.0 % Final    Monocytes % 05/11/2023 9.9 (H)  2.0 - 6.0 % Final    Eosinophils % 05/11/2023 2.1  1.0 - 4.0 % Final    Basophils % 05/11/2023 0.5  0.0 - 1.0 % Final    Immature Granulocytes % 05/11/2023 0.2  0.0 - 0.4 % Final    nRBC, Auto 05/11/2023 0.0  <=0.0 % Final    Neutrophils, Abs 05/11/2023 2.67  1.80 - 7.70 K/uL Final    Lymphocytes, Absolute 05/11/2023 2.82  1.00 - 4.80 K/uL Final    Monocytes, Absolute 05/11/2023 0.62  0.00 - 0.80 K/uL Final    Eosinophils, Absolute 05/11/2023 0.13  0.00 - 0.50 K/uL Final    Basophils, Absolute 05/11/2023 0.03  0.00 - 0.20 K/uL Final    Immature Granulocytes, Absolute 05/11/2023 0.01  0.00 - 0.04 K/uL Final    nRBC, Absolute 05/11/2023 0.00  <=0.00 x10e3/uL Final    Diff Type 05/11/2023 Auto   Final   Office Visit on 05/11/2023   Component Date Value Ref Range Status    Case Report 05/11/2023    Final                    Value:Pap Cytology                                      Case: [id number]                                   Authorizing  Provider:  Eladio Coyle MD      Collected:           05/11/2023 10:59 AM          Ordering Location:     [facility] Received:            05/12/2023 08:19 AM                                 - Obstetrics And                                                                                    Gynecology                                                                   First Screen:          SAMMY Wilson(ASCP)                                                    Pathologist:           Demario Foster III, MD                                                                           Specimen:    Liquid-Based Pap Test, Screening, Endocervix                                               Interpretation 05/11/2023 Atypical Squamous Cells of Undetermined Significance (A)              Final    General Categorization 05/11/2023 Epithelial Cell Abnormality   Final    Specimen Adequacy 05/11/2023 Satisfactory for evaluation   Final    Clinical Information 05/11/2023    Final                    Value:This result contains rich text formatting which cannot be displayed here.    Disclaimer 05/11/2023    Final                    Value:This result contains rich text formatting which cannot be displayed here.    HPV 16 05/11/2023 Negative  Negative, Invalid Final    HPV 18 05/11/2023 Negative  Negative, Invalid Final    HPV Other 05/11/2023 Positive (A)  Negative, Invalid Final   Office Visit on 04/27/2023   Component Date Value Ref Range Status    Case Report 04/27/2023    Final                    Value:Pap Cytology                                      Case: [id number]                                   Authorizing Provider:  Naheed Peacock CNM        Collected:           04/27/2023 11:57 AM          Ordering Location:     [facility]       Received:            04/27/2023 11:57 AM                                 Clinic -  Obstetrics and                                                                             Gynecology                                                                   First Screen:          SAMMY Up(ASCP)                                                        Pathologist:           Eulalio Zhang MD                                                       Specimen:    Liquid-Based Pap Test, Screening, Cervix                                                   Interpretation 04/27/2023 Low Grade Squamous Intraepithelial Lesion (A)              Final    General Categorization 04/27/2023 Epithelial Cell Abnormality   Final    Specimen Adequacy 04/27/2023 Satisfactory for evaluation   Final    Clinical Information 04/27/2023    Final                    Value:This result contains rich text formatting which cannot be displayed here.    Disclaimer 04/27/2023    Final                    Value:This result contains rich text formatting which cannot be displayed here.         Orders Placed This Encounter   Procedures    Ambulatory referral/consult to Gastroenterology     Standing Status:   Future     Standing Expiration Date:   11/24/2024     Referral Priority:   Routine     Referral Type:   Consultation     Referral Reason:   Specialty Services Required     Requested Specialty:   Gastroenterology     Number of Visits Requested:   1       Requested Prescriptions     Signed Prescriptions Disp Refills    cyclobenzaprine (FLEXERIL) 10 MG tablet 90 tablet 2     Sig: Take 1 tablet (10 mg total) by mouth 3 (three) times daily as needed for Muscle spasms.    amitriptyline (ELAVIL) 25 MG tablet 90 tablet 2     Sig: Take 1 tablet (25 mg total) by mouth nightly.    pregabalin (LYRICA) 50 MG capsule 60 capsule 2     Sig: Take 1 capsule (50 mg total) by mouth 3 (three) times daily.    methocarbamoL (ROBAXIN) 500 MG Tab 90 tablet 2     Sig: Take 1 tablet (500 mg total) by mouth 3 (three) times daily as needed (Muscle spasm).        Assessment:     1. Thoracic radiculopathy    2. Thoracic spine pain    3. Scoliosis of lumbar spine, unspecified scoliosis type    4. Chronic bilateral low back pain without sciatica    5. Epigastric pain         A's of Opioid Risk Assessment  Activity:Patient can perform ADL.   Analgesia:Patients pain is partially controlled by current medication. Patient has tried OTC medications such as Tylenol and Ibuprofen with out relief.   Adverse Effects: Patient denies constipation or sedation.  Aberrant Behavior:  reviewed with no aberrant drug seeking/taking behavior.  Overdose reversal drug naloxone discussed    Drug screen reviewed      Plan:    Not using narcotics from our office      History of Falcon gilles placement October 2021 subsequent explant of hardware due to pain    Requesting refill nonnarcotic medication     Complaint increasing thoracic radicular type symptoms radiating to anterior chest wall pain numbness and tingling     She denies difficulty using her hands or arms denies difficulty ambulating denies loss of bowel or bladder function    She states Lyrica is helping with her chronic discomfort would like to continue with conservative management    Requesting Kenalog injection    Kenalog mg IM, tolerated well    Requesting referral gastroenterology abdominal pain chronic constipation    Continue current medication    Continue home exercise program as directed    Follow-up 3 months     Dr. Luciano, March 2024    Bring original prescription medication bottles/container/box with labels to each visit

## 2023-10-24 ENCOUNTER — OFFICE VISIT (OUTPATIENT)
Dept: PAIN MEDICINE | Facility: CLINIC | Age: 23
End: 2023-10-24
Payer: COMMERCIAL

## 2023-10-24 VITALS
WEIGHT: 138 LBS | HEART RATE: 77 BPM | SYSTOLIC BLOOD PRESSURE: 108 MMHG | RESPIRATION RATE: 16 BRPM | DIASTOLIC BLOOD PRESSURE: 69 MMHG | BODY MASS INDEX: 22.18 KG/M2 | HEIGHT: 66 IN

## 2023-10-24 DIAGNOSIS — M41.9 SCOLIOSIS OF LUMBAR SPINE, UNSPECIFIED SCOLIOSIS TYPE: Chronic | ICD-10-CM

## 2023-10-24 DIAGNOSIS — M54.14 THORACIC RADICULOPATHY: Primary | Chronic | ICD-10-CM

## 2023-10-24 DIAGNOSIS — G89.29 CHRONIC BILATERAL LOW BACK PAIN WITHOUT SCIATICA: Chronic | ICD-10-CM

## 2023-10-24 DIAGNOSIS — M54.50 CHRONIC BILATERAL LOW BACK PAIN WITHOUT SCIATICA: Chronic | ICD-10-CM

## 2023-10-24 DIAGNOSIS — R10.13 EPIGASTRIC PAIN: Chronic | ICD-10-CM

## 2023-10-24 DIAGNOSIS — M54.6 THORACIC SPINE PAIN: Chronic | ICD-10-CM

## 2023-10-24 PROBLEM — R10.9 ABDOMINAL PAIN: Chronic | Status: ACTIVE | Noted: 2023-10-24

## 2023-10-24 PROCEDURE — 3008F PR BODY MASS INDEX (BMI) DOCUMENTED: ICD-10-PCS | Mod: CPTII,,, | Performed by: PHYSICIAN ASSISTANT

## 2023-10-24 PROCEDURE — 99999PBSHW PR PBB SHADOW TECHNICAL ONLY FILED TO HB: ICD-10-PCS | Mod: PBBFAC,,,

## 2023-10-24 PROCEDURE — 3008F BODY MASS INDEX DOCD: CPT | Mod: CPTII,,, | Performed by: PHYSICIAN ASSISTANT

## 2023-10-24 PROCEDURE — 1159F MED LIST DOCD IN RCRD: CPT | Mod: CPTII,,, | Performed by: PHYSICIAN ASSISTANT

## 2023-10-24 PROCEDURE — 3078F PR MOST RECENT DIASTOLIC BLOOD PRESSURE < 80 MM HG: ICD-10-PCS | Mod: CPTII,,, | Performed by: PHYSICIAN ASSISTANT

## 2023-10-24 PROCEDURE — 3078F DIAST BP <80 MM HG: CPT | Mod: CPTII,,, | Performed by: PHYSICIAN ASSISTANT

## 2023-10-24 PROCEDURE — 3074F SYST BP LT 130 MM HG: CPT | Mod: CPTII,,, | Performed by: PHYSICIAN ASSISTANT

## 2023-10-24 PROCEDURE — 99214 PR OFFICE/OUTPT VISIT, EST, LEVL IV, 30-39 MIN: ICD-10-PCS | Mod: S$PBB,25,, | Performed by: PHYSICIAN ASSISTANT

## 2023-10-24 PROCEDURE — 1159F PR MEDICATION LIST DOCUMENTED IN MEDICAL RECORD: ICD-10-PCS | Mod: CPTII,,, | Performed by: PHYSICIAN ASSISTANT

## 2023-10-24 PROCEDURE — 3074F PR MOST RECENT SYSTOLIC BLOOD PRESSURE < 130 MM HG: ICD-10-PCS | Mod: CPTII,,, | Performed by: PHYSICIAN ASSISTANT

## 2023-10-24 PROCEDURE — 99214 OFFICE O/P EST MOD 30 MIN: CPT | Mod: PBBFAC | Performed by: PHYSICIAN ASSISTANT

## 2023-10-24 PROCEDURE — 99999PBSHW PR PBB SHADOW TECHNICAL ONLY FILED TO HB: Mod: PBBFAC,,,

## 2023-10-24 PROCEDURE — 96372 THER/PROPH/DIAG INJ SC/IM: CPT | Mod: PBBFAC | Performed by: PHYSICIAN ASSISTANT

## 2023-10-24 PROCEDURE — 99214 OFFICE O/P EST MOD 30 MIN: CPT | Mod: S$PBB,25,, | Performed by: PHYSICIAN ASSISTANT

## 2023-10-24 RX ORDER — PREGABALIN 50 MG/1
50 CAPSULE ORAL 3 TIMES DAILY
Qty: 60 CAPSULE | Refills: 2 | Status: SHIPPED | OUTPATIENT
Start: 2023-10-24 | End: 2024-01-08

## 2023-10-24 RX ORDER — AMITRIPTYLINE HYDROCHLORIDE 25 MG/1
25 TABLET, FILM COATED ORAL NIGHTLY
Qty: 90 TABLET | Refills: 2 | Status: SHIPPED | OUTPATIENT
Start: 2023-10-24 | End: 2023-12-21 | Stop reason: SDUPTHER

## 2023-10-24 RX ORDER — TRIAMCINOLONE ACETONIDE 40 MG/ML
40 INJECTION, SUSPENSION INTRA-ARTICULAR; INTRAMUSCULAR
Status: COMPLETED | OUTPATIENT
Start: 2023-10-24 | End: 2023-10-24

## 2023-10-24 RX ORDER — CYCLOBENZAPRINE HCL 10 MG
10 TABLET ORAL 3 TIMES DAILY PRN
Qty: 90 TABLET | Refills: 2 | Status: SHIPPED | OUTPATIENT
Start: 2023-10-24 | End: 2024-01-08

## 2023-10-24 RX ORDER — METHOCARBAMOL 500 MG/1
500 TABLET, FILM COATED ORAL 3 TIMES DAILY PRN
Qty: 90 TABLET | Refills: 2 | Status: SHIPPED | OUTPATIENT
Start: 2023-10-24 | End: 2023-12-21

## 2023-10-24 RX ADMIN — TRIAMCINOLONE ACETONIDE 40 MG: 40 INJECTION, SUSPENSION INTRA-ARTICULAR; INTRAMUSCULAR at 11:10

## 2023-11-01 ENCOUNTER — PATIENT MESSAGE (OUTPATIENT)
Dept: PAIN MEDICINE | Facility: CLINIC | Age: 23
End: 2023-11-01
Payer: COMMERCIAL

## 2023-11-29 ENCOUNTER — OFFICE VISIT (OUTPATIENT)
Dept: GASTROENTEROLOGY | Facility: CLINIC | Age: 23
End: 2023-11-29
Payer: COMMERCIAL

## 2023-11-29 VITALS
BODY MASS INDEX: 21.89 KG/M2 | SYSTOLIC BLOOD PRESSURE: 121 MMHG | HEART RATE: 99 BPM | WEIGHT: 136.19 LBS | RESPIRATION RATE: 19 BRPM | DIASTOLIC BLOOD PRESSURE: 75 MMHG | HEIGHT: 66 IN

## 2023-11-29 DIAGNOSIS — R10.13 EPIGASTRIC PAIN: Chronic | ICD-10-CM

## 2023-11-29 DIAGNOSIS — R13.10 DYSPHAGIA, UNSPECIFIED TYPE: ICD-10-CM

## 2023-11-29 DIAGNOSIS — K21.9 GASTROESOPHAGEAL REFLUX DISEASE, UNSPECIFIED WHETHER ESOPHAGITIS PRESENT: Primary | ICD-10-CM

## 2023-11-29 PROCEDURE — 3008F BODY MASS INDEX DOCD: CPT | Mod: CPTII,,, | Performed by: INTERNAL MEDICINE

## 2023-11-29 PROCEDURE — 99204 PR OFFICE/OUTPT VISIT, NEW, LEVL IV, 45-59 MIN: ICD-10-PCS | Mod: S$PBB,,, | Performed by: INTERNAL MEDICINE

## 2023-11-29 PROCEDURE — 3078F PR MOST RECENT DIASTOLIC BLOOD PRESSURE < 80 MM HG: ICD-10-PCS | Mod: CPTII,,, | Performed by: INTERNAL MEDICINE

## 2023-11-29 PROCEDURE — 1159F PR MEDICATION LIST DOCUMENTED IN MEDICAL RECORD: ICD-10-PCS | Mod: CPTII,,, | Performed by: INTERNAL MEDICINE

## 2023-11-29 PROCEDURE — 1159F MED LIST DOCD IN RCRD: CPT | Mod: CPTII,,, | Performed by: INTERNAL MEDICINE

## 2023-11-29 PROCEDURE — 3074F SYST BP LT 130 MM HG: CPT | Mod: CPTII,,, | Performed by: INTERNAL MEDICINE

## 2023-11-29 PROCEDURE — 3008F PR BODY MASS INDEX (BMI) DOCUMENTED: ICD-10-PCS | Mod: CPTII,,, | Performed by: INTERNAL MEDICINE

## 2023-11-29 PROCEDURE — 3074F PR MOST RECENT SYSTOLIC BLOOD PRESSURE < 130 MM HG: ICD-10-PCS | Mod: CPTII,,, | Performed by: INTERNAL MEDICINE

## 2023-11-29 PROCEDURE — 3078F DIAST BP <80 MM HG: CPT | Mod: CPTII,,, | Performed by: INTERNAL MEDICINE

## 2023-11-29 PROCEDURE — 99215 OFFICE O/P EST HI 40 MIN: CPT | Mod: PBBFAC | Performed by: INTERNAL MEDICINE

## 2023-11-29 PROCEDURE — 99204 OFFICE O/P NEW MOD 45 MIN: CPT | Mod: S$PBB,,, | Performed by: INTERNAL MEDICINE

## 2023-11-29 RX ORDER — PANTOPRAZOLE SODIUM 40 MG/1
40 TABLET, DELAYED RELEASE ORAL DAILY
Qty: 30 TABLET | Refills: 3 | Status: SHIPPED | OUTPATIENT
Start: 2023-11-29 | End: 2024-11-28

## 2023-11-29 NOTE — PROGRESS NOTES
CC: abdominal pain    HPI 23 y.o. female with endometriosis, anxiety, depression, here with complaints of ongoing epigastric abdominal pain associated with reflux and intermittent dysphagia that has been bothersome. She reports findings of celiac disease as a child but she did not have EGD performed. She has had complaints of epigastric abdominal pain that occurs almost daily. She believes symptoms are related to reflux. Has not tried medication for reflux. Occasionally feels food getting stuck. No regurgitation. No pain when swallowing. H/H in past 14.8/47.5. Additionally complains of alternating bowel habits with constipation and BMs every other day. No blood in stool. No family history of colon cancer.    Medical records reviewed. Additional history supplemented by nursing.     Past Medical History:   Diagnosis Date    Amenorrhea 2014    Started to regulate after 2years on depo (2019)    Anxiety 2020    Celiac disease     Chlamydia infection     Depression 2020    Dyspareunia 2020    Endometriosis, herpes, birth control, cysts    Endometriosis     Heart murmur 2017    Benign    Herpes simplex virus (HSV) infection     Mental disorder     anexity and depression    Ovarian cyst     Scoliosis     STD (sexually transmitted disease) 2020    Herpes     Past Surgical History:   Procedure Laterality Date    ENDOMETRIAL ABLATION  2016    HYSTEROSCOPY      PELVIC LAPAROSCOPY  2022    SPINAL FUSION  10/03/2017    SURGICAL REMOVAL OF ENDOMETRIOSIS  2014    TONSILLECTOMY  2014     Social History  Social History     Tobacco Use    Smoking status: Never    Smokeless tobacco: Never   Substance Use Topics    Alcohol use: Never    Drug use: Not Currently     Types: Marijuana     Comment: every couple of days     Family History   Problem Relation Age of Onset    Lupus Paternal Grandmother     Ovarian cancer Maternal Grandmother     Cancer Maternal Grandmother         Ovarian cancer    Diabetes Maternal Grandfather     Heart failure  "Maternal Grandfather     Breast cancer Maternal Aunt         Breast cancer reoccurring    Cancer Maternal Aunt     Cancer Maternal Uncle     Cancer Paternal Aunt     Diabetes Paternal Aunt         Diabetes    Heart failure Father     Cancer Maternal Aunt         Breast cancer reoccurring    Heart failure Maternal Aunt      Review of Systems  General ROS: negative for chills, fever or weight loss  Psychological ROS: negative for hallucination, depression or suicidal ideation  Ophthalmic ROS: negative for blurry vision, photophobia or eye pain  ENT ROS: negative for epistaxis, sore throat or rhinorrhea  Respiratory ROS: no cough, shortness of breath, or wheezing  Cardiovascular ROS: no chest pain or dyspnea on exertion  Gastrointestinal ROS: +dysphagia, no abdominal pain, +constipation, no black/ bloody stools  Genito-Urinary ROS: no dysuria, trouble voiding, or hematuria  Musculoskeletal ROS: negative for gait disturbance or muscular weakness  Neurological ROS: no syncope or seizures; no ataxia  Dermatological ROS: negative for pruritis, rash and jaundice    Physical Examination  /75 (BP Location: Right arm, Patient Position: Sitting)   Pulse 99   Resp 19   Ht 5' 6" (1.676 m)   Wt 61.8 kg (136 lb 3.2 oz)   BMI 21.98 kg/m²   General appearance: alert, cooperative, no distress  HENT: Normocephalic, atraumatic, neck symmetrical  Eyes: conjunctivae clear  Lungs: No labored breathing  Skin: No jaundice  Neurologic: Alert and oriented X 3      Labs:  Lab Results   Component Value Date    WBC 6.28 05/11/2023    HGB 14.8 05/11/2023    HCT 47.5 (H) 05/11/2023    MCV 95.4 05/11/2023     05/11/2023     Imaging:  US Pelvis:  Left ovarian cyst  Independently reviewed    Assessment:   GERD  Intermittent dysphagia  Epigastric abdominal pain    Plan:   -Check Ttg IgA and total IgA level for evaluation of celiac disease  -Trial of protonix 40 mg PO daily taken for GERD  -EGD for evaluation of epigastric abdominal " pain and reflux as well as dysphagia; dilation if necessary during EGD      45 minutes of total time spent on the encounter, which includes face to face time and non-face to face time preparing to see the patient (eg, review of tests), obtaining and/or reviewing separately obtained history, documenting clinical information in the electronic or other health record, Independently interpreting results (not separately reported) and communicating results to the patient/family/caregiver, or care coordination (not separately reported).        Carson Castro MD  [facility]

## 2023-12-21 RX ORDER — CYCLOBENZAPRINE HCL 10 MG
10 TABLET ORAL 3 TIMES DAILY PRN
Qty: 90 TABLET | Refills: 2 | Status: CANCELLED | OUTPATIENT
Start: 2023-12-21

## 2023-12-21 NOTE — PROGRESS NOTES
Subjective:         Patient ID: Kd Joshua is a 23 y.o. female.    Chief Complaint: Mid-back Pain      Pain  This is a chronic problem. The current episode started more than 1 year ago. The problem occurs daily. The problem has been unchanged. Associated symptoms include arthralgias. Pertinent negatives include no anorexia, change in bowel habit, chest pain, chills, coughing, diaphoresis, fever, neck pain, sore throat, swollen glands, urinary symptoms, vertigo or vomiting.     Review of Systems   Constitutional:  Negative for activity change, appetite change, chills, diaphoresis, fever and unexpected weight change.   HENT:  Negative for drooling, ear discharge, ear pain, facial swelling, nosebleeds, sore throat, trouble swallowing, voice change and goiter.    Eyes:  Negative for photophobia, pain, discharge, redness and visual disturbance.   Respiratory:  Negative for apnea, cough, choking, chest tightness, shortness of breath, wheezing and stridor.    Cardiovascular:  Negative for chest pain, palpitations and leg swelling.   Gastrointestinal:  Negative for abdominal distention, anorexia, change in bowel habit, diarrhea, rectal pain, vomiting and fecal incontinence.   Endocrine: Negative for cold intolerance, heat intolerance, polydipsia, polyphagia and polyuria.   Genitourinary:  Negative for bladder incontinence, dysuria, flank pain, frequency and hot flashes.   Musculoskeletal:  Positive for arthralgias, back pain and leg pain. Negative for neck pain.   Integumentary:  Negative for color change and pallor.   Allergic/Immunologic: Negative for immunocompromised state.   Neurological:  Negative for dizziness, vertigo, seizures, syncope, facial asymmetry, speech difficulty, light-headedness, memory loss and coordination difficulties.   Hematological:  Negative for adenopathy. Does not bruise/bleed easily.   Psychiatric/Behavioral:  Negative for agitation, behavioral problems, confusion, decreased  concentration, dysphoric mood, hallucinations, self-injury and suicidal ideas. The patient is not nervous/anxious and is not hyperactive.            Past Medical History:   Diagnosis Date    Amenorrhea 2014    Started to regulate after 2years on depo (2019)    Anxiety 2020    Celiac disease     Chlamydia infection     Depression 2020    Dyspareunia 2020    Endometriosis, herpes, birth control, cysts    Endometriosis     Heart murmur 2017    Benign    Herpes simplex virus (HSV) infection     Mental disorder     anexity and depression    Ovarian cyst     Scoliosis     STD (sexually transmitted disease) 2020    Herpes     Past Surgical History:   Procedure Laterality Date    ENDOMETRIAL ABLATION  2016    HYSTEROSCOPY      PELVIC LAPAROSCOPY  2022    SPINAL FUSION  10/03/2017    SURGICAL REMOVAL OF ENDOMETRIOSIS  2014    TONSILLECTOMY  2014     Social History     Socioeconomic History    Marital status: Single   Tobacco Use    Smoking status: Never    Smokeless tobacco: Never   Substance and Sexual Activity    Alcohol use: Never    Drug use: Not Currently     Types: Marijuana     Comment: every couple of days    Sexual activity: Yes     Partners: Male     Birth control/protection: Condom, Rhythm, None     Comment: Last sexual intercourse 9/9/2021     Family History   Problem Relation Age of Onset    Lupus Paternal Grandmother     Ovarian cancer Maternal Grandmother     Cancer Maternal Grandmother         Ovarian cancer    Diabetes Maternal Grandfather     Heart failure Maternal Grandfather     Breast cancer Maternal Aunt         Breast cancer reoccurring    Cancer Maternal Aunt     Cancer Maternal Uncle     Cancer Paternal Aunt     Diabetes Paternal Aunt         Diabetes    Heart failure Father     Cancer Maternal Aunt         Breast cancer reoccurring    Heart failure Maternal Aunt      Review of patient's allergies indicates:   Allergen Reactions    Codeine     Oxycodone Itching        Objective:  Vitals:    01/08/24  "0834 01/08/24 0836   BP: 112/63    Pulse: 88    Resp: 18    Weight: 61.2 kg (135 lb)    Height: 5' 6" (1.676 m)    PainSc:   7   7           Physical Exam  Vitals and nursing note reviewed. Exam conducted with a chaperone present.   Constitutional:       General: She is awake. She is not in acute distress.     Appearance: Normal appearance. She is not ill-appearing, toxic-appearing or diaphoretic.   HENT:      Head: Normocephalic and atraumatic.      Nose: Nose normal.      Mouth/Throat:      Mouth: Mucous membranes are moist.      Pharynx: Oropharynx is clear.   Eyes:      Conjunctiva/sclera: Conjunctivae normal.      Pupils: Pupils are equal, round, and reactive to light.   Cardiovascular:      Rate and Rhythm: Normal rate.   Pulmonary:      Effort: Pulmonary effort is normal. No respiratory distress.   Abdominal:      Palpations: Abdomen is soft.      Tenderness: There is no guarding.   Musculoskeletal:         General: Normal range of motion.      Cervical back: Normal range of motion and neck supple. No rigidity.   Skin:     General: Skin is warm and dry.      Coloration: Skin is not jaundiced or pale.   Neurological:      General: No focal deficit present.      Mental Status: She is alert and oriented to person, place, and time. Mental status is at baseline.      Cranial Nerves: No cranial nerve deficit (II-XII).   Psychiatric:         Mood and Affect: Mood normal.         Behavior: Behavior normal. Behavior is cooperative.         Thought Content: Thought content normal.           US Pelvis Complete Non OB  Narrative: EXAMINATION:  US PELVIS COMPLETE NON OB    CLINICAL HISTORY:  Personal history of other diseases of the female genital tract    TECHNIQUE:  Transabdominal grayscale and color Doppler ultrasound performed of the uterus and adnexa.  Ultrasound images captured and stored.    COMPARISON:  07/18/2022    FINDINGS:  Uterus appears normal measuring 7.3 x 3.6 x 4.8 cm.  Endometrial stripe 1 cm.  Right " ovary is normal.  There is a simple cyst left ovary measuring 3 x 1.7 x 3.3 cm.  Small amount of free fluid in the cul-de-sac.  Impression: Simple appearing left ovarian cyst.    Electronically signed by: Angel Conroy  Date:    2023  Time:    16:19       Lab Visit on 2023   Component Date Value Ref Range Status    IgA 2023 126  61 - 356 mg/dL Final         No orders of the defined types were placed in this encounter.      Requested Prescriptions     Signed Prescriptions Disp Refills    TENS unit and electrodes Cmpk 1 each 0     Si Units by Misc.(Non-Drug; Combo Route) route once daily.    amitriptyline (ELAVIL) 25 MG tablet 90 tablet 5     Sig: Take 1 tablet (25 mg total) by mouth nightly.    ibuprofen (ADVIL,MOTRIN) 800 MG tablet 60 tablet 5     Sig: Take 1 tablet (800 mg total) by mouth every 8 (eight) hours as needed for Pain. Take 1 tablet every 8 hours during menstrual cycles for up to 7 days. Take with food.    pregabalin (LYRICA) 50 MG capsule 60 capsule 5     Sig: Take 1 capsule (50 mg total) by mouth 3 (three) times daily.       Assessment:     1. Thoracic spine pain    2. Scoliosis of lumbar spine, unspecified scoliosis type    3. Chronic bilateral low back pain without sciatica    4. Thoracic radiculopathy    5. Hx of endometriosis    6. Dysmenorrhea         A's of Opioid Risk Assessment  Activity:Patient can perform ADL.   Analgesia:Patients pain is partially controlled by current medication. Patient has tried OTC medications such as Tylenol and Ibuprofen with out relief.   Adverse Effects: Patient denies constipation or sedation.  Aberrant Behavior:  reviewed with no aberrant drug seeking/taking behavior.  Overdose reversal drug naloxone discussed    Drug screen reviewed      Plan:    Not using narcotics from our office      History of Falcon gilles placement 2021 subsequent explant of hardware due to pain    Requesting refill nonnarcotic medication     Requesting  discontinue Flexeril, Robaxin not helping     Requesting 10s unit    Otherwise she states she is doing well has no new complaints    Continue current medication    Continue home exercise program as directed    Follow-up 6 months     Dr. Luciano, March 2024    Bring original prescription medication bottles/container/box with labels to each visit

## 2024-01-08 ENCOUNTER — OFFICE VISIT (OUTPATIENT)
Dept: PAIN MEDICINE | Facility: CLINIC | Age: 24
End: 2024-01-08
Payer: COMMERCIAL

## 2024-01-08 VITALS
BODY MASS INDEX: 21.69 KG/M2 | HEIGHT: 66 IN | RESPIRATION RATE: 18 BRPM | WEIGHT: 135 LBS | DIASTOLIC BLOOD PRESSURE: 63 MMHG | HEART RATE: 88 BPM | SYSTOLIC BLOOD PRESSURE: 112 MMHG

## 2024-01-08 DIAGNOSIS — M54.6 THORACIC SPINE PAIN: Primary | Chronic | ICD-10-CM

## 2024-01-08 DIAGNOSIS — M54.50 CHRONIC BILATERAL LOW BACK PAIN WITHOUT SCIATICA: Chronic | ICD-10-CM

## 2024-01-08 DIAGNOSIS — G89.29 CHRONIC BILATERAL LOW BACK PAIN WITHOUT SCIATICA: Chronic | ICD-10-CM

## 2024-01-08 DIAGNOSIS — M41.9 SCOLIOSIS OF LUMBAR SPINE, UNSPECIFIED SCOLIOSIS TYPE: Chronic | ICD-10-CM

## 2024-01-08 DIAGNOSIS — M54.14 THORACIC RADICULOPATHY: Chronic | ICD-10-CM

## 2024-01-08 DIAGNOSIS — N94.6 DYSMENORRHEA: ICD-10-CM

## 2024-01-08 DIAGNOSIS — Z87.42 HX OF ENDOMETRIOSIS: ICD-10-CM

## 2024-01-08 PROCEDURE — 99214 OFFICE O/P EST MOD 30 MIN: CPT | Mod: S$PBB,,, | Performed by: PHYSICIAN ASSISTANT

## 2024-01-08 PROCEDURE — 1159F MED LIST DOCD IN RCRD: CPT | Mod: CPTII,,, | Performed by: PHYSICIAN ASSISTANT

## 2024-01-08 PROCEDURE — 3074F SYST BP LT 130 MM HG: CPT | Mod: CPTII,,, | Performed by: PHYSICIAN ASSISTANT

## 2024-01-08 PROCEDURE — 99214 OFFICE O/P EST MOD 30 MIN: CPT | Mod: PBBFAC | Performed by: PHYSICIAN ASSISTANT

## 2024-01-08 PROCEDURE — 3008F BODY MASS INDEX DOCD: CPT | Mod: CPTII,,, | Performed by: PHYSICIAN ASSISTANT

## 2024-01-08 PROCEDURE — 3078F DIAST BP <80 MM HG: CPT | Mod: CPTII,,, | Performed by: PHYSICIAN ASSISTANT

## 2024-01-08 RX ORDER — IBUPROFEN 800 MG/1
800 TABLET ORAL EVERY 8 HOURS PRN
Qty: 60 TABLET | Refills: 5 | Status: SHIPPED | OUTPATIENT
Start: 2024-01-08 | End: 2025-01-07

## 2024-01-08 RX ORDER — PREGABALIN 50 MG/1
50 CAPSULE ORAL 3 TIMES DAILY
Qty: 60 CAPSULE | Refills: 5 | Status: SHIPPED | OUTPATIENT
Start: 2024-01-08 | End: 2024-01-08

## 2024-01-08 RX ORDER — PREGABALIN 50 MG/1
50 CAPSULE ORAL 3 TIMES DAILY
Qty: 60 CAPSULE | Refills: 5 | Status: SHIPPED | OUTPATIENT
Start: 2024-01-08 | End: 2024-01-11 | Stop reason: SDUPTHER

## 2024-01-08 RX ORDER — IBUPROFEN 800 MG/1
800 TABLET ORAL EVERY 8 HOURS PRN
Qty: 60 TABLET | Refills: 5 | Status: SHIPPED | OUTPATIENT
Start: 2024-01-08 | End: 2024-01-08

## 2024-01-08 RX ORDER — AMITRIPTYLINE HYDROCHLORIDE 25 MG/1
25 TABLET, FILM COATED ORAL NIGHTLY
Qty: 90 TABLET | Refills: 5 | Status: SHIPPED | OUTPATIENT
Start: 2024-01-08 | End: 2024-01-08

## 2024-01-08 RX ORDER — AMITRIPTYLINE HYDROCHLORIDE 25 MG/1
25 TABLET, FILM COATED ORAL NIGHTLY
Qty: 90 TABLET | Refills: 5 | Status: SHIPPED | OUTPATIENT
Start: 2024-01-08

## 2024-01-10 ENCOUNTER — ANESTHESIA (OUTPATIENT)
Dept: GASTROENTEROLOGY | Facility: HOSPITAL | Age: 24
End: 2024-01-10
Payer: COMMERCIAL

## 2024-01-10 ENCOUNTER — PATIENT MESSAGE (OUTPATIENT)
Dept: PAIN MEDICINE | Facility: CLINIC | Age: 24
End: 2024-01-10
Payer: COMMERCIAL

## 2024-01-10 ENCOUNTER — ANESTHESIA EVENT (OUTPATIENT)
Dept: GASTROENTEROLOGY | Facility: HOSPITAL | Age: 24
End: 2024-01-10
Payer: COMMERCIAL

## 2024-01-10 ENCOUNTER — HOSPITAL ENCOUNTER (OUTPATIENT)
Dept: GASTROENTEROLOGY | Facility: HOSPITAL | Age: 24
Discharge: HOME | End: 2024-01-10
Attending: INTERNAL MEDICINE
Payer: COMMERCIAL

## 2024-01-10 VITALS
DIASTOLIC BLOOD PRESSURE: 76 MMHG | HEIGHT: 66 IN | BODY MASS INDEX: 21.69 KG/M2 | SYSTOLIC BLOOD PRESSURE: 120 MMHG | WEIGHT: 135 LBS | TEMPERATURE: 98 F | HEART RATE: 86 BPM | RESPIRATION RATE: 20 BRPM | OXYGEN SATURATION: 100 %

## 2024-01-10 DIAGNOSIS — R13.10 DYSPHAGIA, UNSPECIFIED TYPE: ICD-10-CM

## 2024-01-10 DIAGNOSIS — R10.13 EPIGASTRIC PAIN: ICD-10-CM

## 2024-01-10 DIAGNOSIS — K21.9 GASTROESOPHAGEAL REFLUX DISEASE, UNSPECIFIED WHETHER ESOPHAGITIS PRESENT: ICD-10-CM

## 2024-01-10 LAB
B-HCG UR QL: NEGATIVE
CTP QC/QA: YES

## 2024-01-10 PROCEDURE — 37000009 HC ANESTHESIA EA ADD 15 MINS

## 2024-01-10 PROCEDURE — 88305 TISSUE EXAM BY PATHOLOGIST: CPT | Mod: TC,SUR | Performed by: INTERNAL MEDICINE

## 2024-01-10 PROCEDURE — 37000008 HC ANESTHESIA 1ST 15 MINUTES

## 2024-01-10 PROCEDURE — 88305 TISSUE EXAM BY PATHOLOGIST: CPT | Mod: 26,59,, | Performed by: PATHOLOGY

## 2024-01-10 PROCEDURE — 25000003 PHARM REV CODE 250: Performed by: NURSE ANESTHETIST, CERTIFIED REGISTERED

## 2024-01-10 PROCEDURE — 43239 EGD BIOPSY SINGLE/MULTIPLE: CPT | Mod: 59,,, | Performed by: INTERNAL MEDICINE

## 2024-01-10 PROCEDURE — 27201423 OPTIME MED/SURG SUP & DEVICES STERILE SUPPLY

## 2024-01-10 PROCEDURE — 88342 IMHCHEM/IMCYTCHM 1ST ANTB: CPT | Mod: 26,,, | Performed by: PATHOLOGY

## 2024-01-10 PROCEDURE — 43248 EGD GUIDE WIRE INSERTION: CPT | Mod: ,,, | Performed by: INTERNAL MEDICINE

## 2024-01-10 PROCEDURE — 63600175 PHARM REV CODE 636 W HCPCS: Performed by: NURSE ANESTHETIST, CERTIFIED REGISTERED

## 2024-01-10 PROCEDURE — 43248 EGD GUIDE WIRE INSERTION: CPT | Performed by: INTERNAL MEDICINE

## 2024-01-10 PROCEDURE — 43239 EGD BIOPSY SINGLE/MULTIPLE: CPT | Mod: 59 | Performed by: INTERNAL MEDICINE

## 2024-01-10 PROCEDURE — D9220A PRA ANESTHESIA: Mod: ,,, | Performed by: NURSE ANESTHETIST, CERTIFIED REGISTERED

## 2024-01-10 PROCEDURE — 81025 URINE PREGNANCY TEST: CPT | Performed by: INTERNAL MEDICINE

## 2024-01-10 RX ORDER — PROPOFOL 10 MG/ML
VIAL (ML) INTRAVENOUS
Status: DISCONTINUED | OUTPATIENT
Start: 2024-01-10 | End: 2024-01-10

## 2024-01-10 RX ORDER — LIDOCAINE HYDROCHLORIDE 20 MG/ML
INJECTION, SOLUTION EPIDURAL; INFILTRATION; INTRACAUDAL; PERINEURAL
Status: DISCONTINUED | OUTPATIENT
Start: 2024-01-10 | End: 2024-01-10

## 2024-01-10 RX ADMIN — PROPOFOL 40 MG: 10 INJECTION, EMULSION INTRAVENOUS at 10:01

## 2024-01-10 RX ADMIN — PROPOFOL 100 MG: 10 INJECTION, EMULSION INTRAVENOUS at 10:01

## 2024-01-10 RX ADMIN — PROPOFOL 30 MG: 10 INJECTION, EMULSION INTRAVENOUS at 10:01

## 2024-01-10 RX ADMIN — SODIUM CHLORIDE: 9 INJECTION, SOLUTION INTRAVENOUS at 10:01

## 2024-01-10 RX ADMIN — PROPOFOL 50 MG: 10 INJECTION, EMULSION INTRAVENOUS at 10:01

## 2024-01-10 RX ADMIN — LIDOCAINE HYDROCHLORIDE 50 MG: 20 INJECTION, SOLUTION INTRAVENOUS at 10:01

## 2024-01-10 NOTE — TRANSFER OF CARE
"Anesthesia Transfer of Care Note    Patient: Kd Joshua    Procedure(s) Performed: EGD with biopsy and dilation    Patient location: PACU    Transport from OR: Transported from OR on room air with adequate spontaneous ventilation. Continuous ECG monitoring in transport. Continuous SpO2 monitoring in transport    Post pain: adequate analgesia    Post assessment: no apparent anesthetic complications    Post vital signs: stable    Level of consciousness: responds to stimulation    Nausea/Vomiting: no nausea/vomiting    Complications: none    Transfer of care protocol was followed      Last vitals: Visit Vitals  /80 (Patient Position: Lying)   Pulse (!) 116   Temp 36.7 °C (98 °F) (Skin)   Resp (!) 23   Ht 5' 6" (1.676 m)   Wt 61.2 kg (135 lb)   SpO2 100%   Breastfeeding No   BMI 21.79 kg/m²     "

## 2024-01-10 NOTE — ANESTHESIA POSTPROCEDURE EVALUATION
Anesthesia Post Evaluation    Patient: Kd Joshua    Procedure(s) Performed: EGD with biopsy and dilation    Final Anesthesia Type: general      Patient location during evaluation: GI PACU  Patient participation: Yes- Able to Participate  Level of consciousness: awake and alert  Post-procedure vital signs: reviewed and stable  Pain management: adequate  Airway patency: patent    PONV status at discharge: No PONV  Anesthetic complications: no      Cardiovascular status: blood pressure returned to baseline and hemodynamically stable  Respiratory status: spontaneous ventilation, unassisted and room air  Hydration status: euvolemic  Follow-up not needed.              Vitals Value Taken Time   /84 01/10/24 1036   Temp 36.7 °C (98 °F) 01/10/24 1017   Pulse 88 01/10/24 1037   Resp 16 01/10/24 1037   SpO2 100 % 01/10/24 1034   Vitals shown include unvalidated device data.      No case tracking events are documented in the log.      Pain/Alexander Score: Alexander Score: 9 (1/10/2024 10:22 AM)

## 2024-01-10 NOTE — DISCHARGE INSTRUCTIONS
Procedure Date  1/10/24     Impression  Overall Impression:   Performed forceps biopsies in the upper third of the esophagus and middle third of the esophagus to rule out eosinophilic esophagitis  Schatzki ring in the GE junction; dilated to 54 Fr ending size  Mild erythematous mucosa in the antrum; performed cold forceps biopsy  Performed forceps biopsies in the 2nd part of the duodenum to rule out celiac disease     Recommendation  - Discharge patient to home  - Advance diet as tolerated  - Continue present medications  - Await pathology results  - Continue PPI daily   - Patient has a contact number available for emergencies. The signs and symptoms of potential delayed complications were discussed with the patient. Return to normal activities tomorrow. Written discharge instructions were provided to the patient.     Indication  Epigastric pain, Dysphagia, unspecified type, Gastroesophageal reflux disease, unspecified whether esophagitis present    THE NURSE WILL CALL YOU WITH YOUR BIOPSY RESULTS IN A FEW DAYS.    NO DRIVING, OPERATING EQUIPMENT, OR SIGNING LEGAL DOCUMENTS FOR 24 HOURS.

## 2024-01-10 NOTE — H&P
History & Physical - Short Stay  Gastroenterology      SUBJECTIVE:     Procedure: EGD    Chief Complaint/Indication for Procedure: Dysphagia and Reflux    (Not in a hospital admission)      Review of patient's allergies indicates:   Allergen Reactions    Codeine     Oxycodone Itching        Past Medical History:   Diagnosis Date    Amenorrhea 2014    Started to regulate after 2years on depo (2019)    Anxiety 2020    Chlamydia infection     Depression 2020    Dyspareunia 2020    Endometriosis, herpes, birth control, cysts    Endometriosis     Heart murmur 2017    Benign; mother states has resolved since childhood    Herpes simplex virus (HSV) infection     Mental disorder     anexity and depression    Ovarian cyst     Scoliosis     STD (sexually transmitted disease) 2020    Herpes     Past Surgical History:   Procedure Laterality Date    ENDOMETRIAL ABLATION  2016    HYSTEROSCOPY      PELVIC LAPAROSCOPY  2022    SPINAL FUSION  10/03/2017    SURGICAL REMOVAL OF ENDOMETRIOSIS  2014    TONSILLECTOMY  2014     Family History   Problem Relation Age of Onset    Lupus Paternal Grandmother     Ovarian cancer Maternal Grandmother     Cancer Maternal Grandmother         Ovarian cancer    Diabetes Maternal Grandfather     Heart failure Maternal Grandfather     Breast cancer Maternal Aunt         Breast cancer reoccurring    Cancer Maternal Aunt     Cancer Maternal Uncle     Cancer Paternal Aunt     Diabetes Paternal Aunt         Diabetes    Heart failure Father     Cancer Maternal Aunt         Breast cancer reoccurring    Heart failure Maternal Aunt      Social History     Tobacco Use    Smoking status: Every Day     Current packs/day: 0.25     Types: Cigarettes    Smokeless tobacco: Never   Substance Use Topics    Alcohol use: Never    Drug use: Not Currently     Types: Marijuana     Comment: every couple of days         OBJECTIVE:     Vital Signs (Most Recent)  Pulse: 87 (01/10/24 0937)  Resp: 12 (01/10/24 0937)  BP: 119/79  (01/10/24 0937)  SpO2: 98 % (01/10/24 0937)    Physical Exam:                                                       GENERAL:  Comfortable, in no acute distress.                                 HEENT EXAM:  Nonicteric.  No adenopathy.  Oropharynx is clear.               NECK:  Supple.                                                               LUNGS:  Clear.                                                               CARDIAC:  Regular rate and rhythm.  S1, S2.  No murmur.                      ABDOMEN:  Soft, positive bowel sounds, nontender.  No hepatosplenomegaly or masses.  No rebound or guarding.                                             EXTREMITIES:  No edema.     MENTAL STATUS:  Normal, alert and oriented.      ASSESSMENT/PLAN:     Assessment: Dysphagia and Reflux    Plan: EGD

## 2024-01-10 NOTE — ANESTHESIA PREPROCEDURE EVALUATION
01/10/2024  Kd Joshua is a 23 y.o., female.      Pre-op Assessment    I have reviewed the Patient Summary Reports.    I have reviewed the NPO Status.   I have reviewed the Medications.     Review of Systems  Anesthesia Hx:  No problems with previous Anesthesia                Hematology/Oncology:  Hematology Normal   Oncology Normal                                   EENT/Dental:  EENT/Dental Normal           Cardiovascular:  Cardiovascular Normal Exercise tolerance: good                NYHA Classification II                           Pulmonary:  Pulmonary Normal                       Renal/:  Renal/ Normal                 Hepatic/GI:     GERD             Musculoskeletal:  Musculoskeletal Normal                Neurological:    Neuromuscular Disease,                                   Endocrine:  Endocrine Normal            Dermatological:  Skin Normal    Psych:  Psychiatric History                  Physical Exam  General: Well nourished, Cooperative, Alert and Oriented    Airway:  Mallampati: II   Mouth Opening: Normal  TM Distance: Normal  Tongue: Normal  Neck ROM: Normal ROM    Dental:  Intact    Chest/Lungs:  Normal Respiratory Rate    Heart:  Rate: Normal  Rhythm: Regular Rhythm        Anesthesia Plan  Type of Anesthesia, risks & benefits discussed:    Anesthesia Type: Gen Natural Airway  Intra-op Monitoring Plan: Standard ASA Monitors  Post Op Pain Control Plan: multimodal analgesia  Induction:  IV  Informed Consent: Informed consent signed with the Patient and all parties understand the risks and agree with anesthesia plan.  All questions answered. Patient consented to blood products? Yes  ASA Score: 2  Day of Surgery Review of History & Physical: I have interviewed and examined the patient. I have reviewed the patient's H&P dated:     Ready For Surgery From Anesthesia Perspective.     .    Past  Medical History:   Diagnosis Date    Amenorrhea 2014    Started to regulate after 2years on depo (2019)    Anxiety 2020    Chlamydia infection     Depression 2020    Dyspareunia 2020    Endometriosis, herpes, birth control, cysts    Endometriosis     Heart murmur 2017    Benign; mother states has resolved since childhood    Herpes simplex virus (HSV) infection     Mental disorder     anexity and depression    Ovarian cyst     Scoliosis     STD (sexually transmitted disease) 2020    Herpes      Current Outpatient Medications   Medication Sig    amitriptyline (ELAVIL) 25 MG tablet Take 1 tablet (25 mg total) by mouth nightly.    fluticasone propionate (FLONASE) 50 mcg/actuation nasal spray SMARTSI-2 Spray(s) Both Nares Twice Daily    ibuprofen (ADVIL,MOTRIN) 800 MG tablet Take 1 tablet (800 mg total) by mouth every 8 (eight) hours as needed for Pain. Take 1 tablet every 8 hours during menstrual cycles for up to 7 days. Take with food.    ipratropium (ATROVENT) 42 mcg (0.06 %) nasal spray INHALE 2 SPRAYS IN EACH NOSTRIL 3 TIMES DAILY    norethindrone (ORTHO MICRONOR) 0.35 mg tablet Take 1 tablet (0.35 mg total) by mouth once daily.    norethindrone-ethinyl estradiol-iron (MICROGESTIN FE1.5/30) 1.5 mg-30 mcg (21)/75 mg (7) tablet Take 1 tablet by mouth once daily.    ondansetron (ZOFRAN-ODT) 4 MG TbDL Take 1 tablet (4 mg total) by mouth 2 (two) times daily.    pantoprazole (PROTONIX) 40 MG tablet Take 1 tablet (40 mg total) by mouth once daily.    pregabalin (LYRICA) 50 MG capsule Take 1 capsule (50 mg total) by mouth 3 (three) times daily.    TENS unit and electrodes Cmpk 1 Units by Misc.(Non-Drug; Combo Route) route once daily.    terconazole (TERAZOL 3) 0.8 % vaginal cream Place 1 applicator vaginally every evening.    valACYclovir (VALTREX) 1000 MG tablet Take 1 tablet (1,000 mg total) by mouth once daily. Patient states she takes prn     No current facility-administered medications for this encounter.

## 2024-01-11 DIAGNOSIS — M54.6 THORACIC SPINE PAIN: Chronic | ICD-10-CM

## 2024-01-11 DIAGNOSIS — G89.29 CHRONIC BILATERAL LOW BACK PAIN WITHOUT SCIATICA: Chronic | ICD-10-CM

## 2024-01-11 DIAGNOSIS — M54.14 THORACIC RADICULOPATHY: Chronic | ICD-10-CM

## 2024-01-11 DIAGNOSIS — M54.50 CHRONIC BILATERAL LOW BACK PAIN WITHOUT SCIATICA: Chronic | ICD-10-CM

## 2024-01-11 LAB
DHEA SERPL-MCNC: NORMAL
ESTROGEN SERPL-MCNC: NORMAL PG/ML
INSULIN SERPL-ACNC: NORMAL U[IU]/ML
LAB AP GROSS DESCRIPTION: NORMAL
LAB AP LABORATORY NOTES: NORMAL
T3RU NFR SERPL: NORMAL %

## 2024-01-11 RX ORDER — PREGABALIN 50 MG/1
50 CAPSULE ORAL 3 TIMES DAILY
Qty: 60 CAPSULE | Refills: 5 | Status: SHIPPED | OUTPATIENT
Start: 2024-01-11

## 2024-01-16 PROBLEM — K21.9 GASTROESOPHAGEAL REFLUX DISEASE: Status: ACTIVE | Noted: 2024-01-16

## 2024-01-16 PROBLEM — R13.10 DYSPHAGIA: Status: ACTIVE | Noted: 2024-01-16

## 2024-01-18 ENCOUNTER — OFFICE VISIT (OUTPATIENT)
Dept: OBSTETRICS AND GYNECOLOGY | Facility: CLINIC | Age: 24
End: 2024-01-18
Payer: COMMERCIAL

## 2024-01-18 VITALS — DIASTOLIC BLOOD PRESSURE: 60 MMHG | SYSTOLIC BLOOD PRESSURE: 118 MMHG

## 2024-01-18 DIAGNOSIS — Z30.9 ENCOUNTER FOR CONTRACEPTIVE MANAGEMENT, UNSPECIFIED TYPE: ICD-10-CM

## 2024-01-18 DIAGNOSIS — R87.810 ATYPICAL SQUAMOUS CELL CHANGES OF UNDETERMINED SIGNIFICANCE (ASCUS) ON CERVICAL CYTOLOGY WITH POSITIVE HIGH RISK HUMAN PAPILLOMA VIRUS (HPV): Primary | ICD-10-CM

## 2024-01-18 DIAGNOSIS — R87.610 ATYPICAL SQUAMOUS CELL CHANGES OF UNDETERMINED SIGNIFICANCE (ASCUS) ON CERVICAL CYTOLOGY WITH POSITIVE HIGH RISK HUMAN PAPILLOMA VIRUS (HPV): Primary | ICD-10-CM

## 2024-01-18 PROCEDURE — 88142 CYTOPATH C/V THIN LAYER: CPT | Mod: TC,GCY | Performed by: OBSTETRICS & GYNECOLOGY

## 2024-01-18 PROCEDURE — 87624 HPV HI-RISK TYP POOLED RSLT: CPT | Mod: ,,, | Performed by: CLINICAL MEDICAL LABORATORY

## 2024-01-18 PROCEDURE — 99213 OFFICE O/P EST LOW 20 MIN: CPT | Mod: PBBFAC | Performed by: OBSTETRICS & GYNECOLOGY

## 2024-01-18 PROCEDURE — 3078F DIAST BP <80 MM HG: CPT | Mod: CPTII,,, | Performed by: OBSTETRICS & GYNECOLOGY

## 2024-01-18 PROCEDURE — 3074F SYST BP LT 130 MM HG: CPT | Mod: CPTII,,, | Performed by: OBSTETRICS & GYNECOLOGY

## 2024-01-18 PROCEDURE — 99212 OFFICE O/P EST SF 10 MIN: CPT | Mod: S$PBB,,, | Performed by: OBSTETRICS & GYNECOLOGY

## 2024-01-18 RX ORDER — NORETHINDRONE 0.35 MG/1
1 TABLET ORAL DAILY
Qty: 30 TABLET | Refills: 10 | Status: SHIPPED | OUTPATIENT
Start: 2024-01-18 | End: 2024-02-08

## 2024-01-18 NOTE — PROGRESS NOTES
Subjective:       Patient ID: Kd Joshua is a 23 y.o. female.    Chief Complaint: Follow-up (Here for 3 month f/u-hx of 1- abnormal pap, colpo done 7/10/23, and 2-swelling of mons pubis at time of menses. )    Presents for follow-up as directed.    1. History of suspected endometriosis.  Intermittent swelling of mons pubis suggestive of endometriosis.  However this problem has markedly improved with use of progestin only oral contraceptives.      She did have laparoscopic exam done at proximally age 16 by another physician I do not have report of that exam.  Nevertheless she has only light 2-3 day spotting on progestin only oral contraceptives.  Often takes over-the-counter medication for dysmenorrhea.      We have discussed proceeding with repeat laparoscopic examination.  However she does not desire proceeding with surgery at present.      2. History of ASCUS Pap with positive other HPV.  However by history she had HPV vaccination x3 in childhood.      Previous colposcopic exam July 2023 revealed no obvious abnormality.  Discussed repeating Pap today.        Review of Systems      Objective:      Physical Exam  Genitourinary:     Comments: External normal vault normal cervix normal to appearance grossly Pap taken.    In addition external genitalia reveals no swelling of mons pubis at present.      Bimanual examination including rectovaginal examination revealed uterus normal size no obvious adnexal masses noted during examination.      Assessment:       1. Atypical squamous cell changes of undetermined significance (ASCUS) on cervical cytology with positive high risk human papilloma virus (HPV)        Plan:       There are no Patient Instructions on file for this visit.

## 2024-01-18 NOTE — PATIENT INSTRUCTIONS
Discussed benign findings thus far.  However I have discussed proceeding with repeat laparoscopic examination.  She does not desire this at present.    In addition I have discussed the possibility of proceeding with excision and explanation of mons pubis if any further swelling occurs.    She will notify me if desired.      For now we will follow-up Pap and HPV testing taken today.  She will continue progestin only oral contraceptives.      Follow-up with me in 4 months

## 2024-01-19 LAB
GH SERPL-MCNC: NORMAL NG/ML
INSULIN SERPL-ACNC: NORMAL U[IU]/ML
LAB AP CLINICAL INFORMATION: NORMAL
LAB AP GYN INTERPRETATION: NEGATIVE
LAB AP PAP DISCLAIMER COMMENTS: NORMAL
RENIN PLAS-CCNC: NORMAL NG/ML/H

## 2024-01-24 LAB
HPV 16: NEGATIVE
HPV 18: NEGATIVE
HPV OTHER: POSITIVE

## 2024-01-30 NOTE — PROGRESS NOTES
Subjective:         Patient ID: Kd Joshua is a 23 y.o. female.    Chief Complaint: Back Pain      Pain  This is a chronic problem. The current episode started more than 1 year ago. The problem occurs daily. The problem has been waxing and waning. Associated symptoms include arthralgias. Pertinent negatives include no anorexia, change in bowel habit, chest pain, chills, coughing, diaphoresis, fever, neck pain, sore throat, swollen glands, urinary symptoms, vertigo or vomiting.     Review of Systems   Constitutional:  Negative for activity change, appetite change, chills, diaphoresis, fever and unexpected weight change.   HENT:  Negative for drooling, ear discharge, ear pain, facial swelling, nosebleeds, sore throat, trouble swallowing, voice change and goiter.    Eyes:  Negative for photophobia, pain, discharge, redness and visual disturbance.   Respiratory:  Negative for apnea, cough, choking, chest tightness, shortness of breath, wheezing and stridor.    Cardiovascular:  Negative for chest pain, palpitations and leg swelling.   Gastrointestinal:  Negative for abdominal distention, anorexia, change in bowel habit, diarrhea, rectal pain, vomiting and fecal incontinence.   Endocrine: Negative for cold intolerance, heat intolerance, polydipsia, polyphagia and polyuria.   Genitourinary:  Negative for bladder incontinence, dysuria, flank pain, frequency and hot flashes.   Musculoskeletal:  Positive for arthralgias, back pain and leg pain. Negative for neck pain.   Integumentary:  Negative for color change and pallor.   Allergic/Immunologic: Negative for immunocompromised state.   Neurological:  Negative for dizziness, vertigo, seizures, syncope, facial asymmetry, speech difficulty, light-headedness, memory loss and coordination difficulties.   Hematological:  Negative for adenopathy. Does not bruise/bleed easily.   Psychiatric/Behavioral:  Negative for agitation, behavioral problems, confusion, decreased  concentration, dysphoric mood, hallucinations, self-injury and suicidal ideas. The patient is not nervous/anxious and is not hyperactive.            Past Medical History:   Diagnosis Date    Amenorrhea 2014    Started to regulate after 2years on depo (2019)    Anxiety 2020    Chlamydia infection     Depression 2020    Dyspareunia 2020    Endometriosis, herpes, birth control, cysts    Endometriosis     Heart murmur 2017    Benign; mother states has resolved since childhood    Herpes simplex virus (HSV) infection     Mental disorder     anexity and depression    Ovarian cyst     Scoliosis     STD (sexually transmitted disease) 2020    Herpes     Past Surgical History:   Procedure Laterality Date    ENDOMETRIAL ABLATION  2016    HYSTEROSCOPY      PELVIC LAPAROSCOPY  2022    SPINAL FUSION  10/03/2017    SURGICAL REMOVAL OF ENDOMETRIOSIS  2014    TONSILLECTOMY  2014     Social History     Socioeconomic History    Marital status: Single   Tobacco Use    Smoking status: Every Day     Current packs/day: 0.25     Types: Cigarettes    Smokeless tobacco: Never   Substance and Sexual Activity    Alcohol use: Never    Drug use: Not Currently     Types: Marijuana     Comment: every couple of days    Sexual activity: Yes     Partners: Male     Birth control/protection: Condom, Rhythm, None     Comment: Last sexual intercourse 9/9/2021     Family History   Problem Relation Age of Onset    Lupus Paternal Grandmother     Ovarian cancer Maternal Grandmother     Cancer Maternal Grandmother         Ovarian cancer    Diabetes Maternal Grandfather     Heart failure Maternal Grandfather     Breast cancer Maternal Aunt         Breast cancer reoccurring    Cancer Maternal Aunt     Cancer Maternal Uncle     Cancer Paternal Aunt     Diabetes Paternal Aunt         Diabetes    Heart failure Father     Cancer Maternal Aunt         Breast cancer reoccurring    Heart failure Maternal Aunt      Review of patient's allergies indicates:   Allergen  "Reactions    Codeine     Oxycodone Itching        Objective:  Vitals:    01/31/24 0925 01/31/24 0926   BP: 110/65    Pulse: 81    Resp: 18    SpO2: 98%    Weight: 64.9 kg (143 lb)    Height: 5' 6" (1.676 m)    PainSc:   8   8           Physical Exam  Vitals and nursing note reviewed. Exam conducted with a chaperone present.   Constitutional:       General: She is awake. She is not in acute distress.     Appearance: Normal appearance. She is not ill-appearing, toxic-appearing or diaphoretic.   HENT:      Head: Normocephalic and atraumatic.      Nose: Nose normal.      Mouth/Throat:      Mouth: Mucous membranes are moist.      Pharynx: Oropharynx is clear.   Eyes:      Conjunctiva/sclera: Conjunctivae normal.      Pupils: Pupils are equal, round, and reactive to light.   Cardiovascular:      Rate and Rhythm: Normal rate.   Pulmonary:      Effort: Pulmonary effort is normal. No respiratory distress.   Abdominal:      Palpations: Abdomen is soft.      Tenderness: There is no guarding.   Musculoskeletal:         General: Normal range of motion.      Cervical back: Normal range of motion and neck supple. No rigidity.   Skin:     General: Skin is warm and dry.      Coloration: Skin is not jaundiced or pale.   Neurological:      General: No focal deficit present.      Mental Status: She is alert and oriented to person, place, and time. Mental status is at baseline.      Cranial Nerves: No cranial nerve deficit (II-XII).   Psychiatric:         Mood and Affect: Mood normal.         Behavior: Behavior normal. Behavior is cooperative.         Thought Content: Thought content normal.           EGD  Narrative: Table formatting from the original result was not included.  Procedure Date  1/10/24    Impression  Overall   Impression:    Performed forceps biopsies in the upper third of the esophagus and middle   third of the esophagus to rule out eosinophilic esophagitis  Schatzki ring in the GE junction; dilated to 54 Fr ending " size  Mild erythematous mucosa in the antrum; performed cold forceps biopsy  Performed forceps biopsies in the 2nd part of the duodenum to rule out   celiac disease    Recommendation  - Discharge patient to home  - Advance diet as tolerated  - Continue present medications  - Await pathology results  - Continue PPI daily   - Patient has a contact number available for emergencies. The signs and   symptoms of potential delayed complications were discussed with the   patient. Return to normal activities tomorrow. Written discharge   instructions were provided to the patient.    Indication  Epigastric pain, Dysphagia, unspecified type, Gastroesophageal reflux   disease, unspecified whether esophagitis present    Providers  Venus Farley, RN Registered Nurse   Susan Schwarz Technician   Lois Florentino CRNA CRNA Veerisetty, Sai S., MD Proceduralist     Medications  General anesthesia - See anesthesia record.    Preprocedure  A history and physical has been performed, and patient medication   allergies have been reviewed. The patient's tolerance of previous   anesthesia has been reviewed. The risks and benefits of the procedure and   the sedation options and risks were discussed with the patient. All   questions were answered and informed consent obtained.    ASA Score: ASA 3 - Patient with moderate systemic disease with functional   limitations  Mallampati Airway Score: II (hard and soft palate, upper portion of   tonsils anduvula visible)    Details of the Procedure  The patient underwent general anesthesia, which was administered by an   anesthesia professional. The patient's blood pressure, heart rate, level   of consciousness, oxygen, respirations, ECG and ETCO2 were monitored   throughout the procedure. The scope was introduced through the mouth and   advanced to the third part of the duodenum. Retroflexion was performed in   the cardia. Prior to the procedure, the patient's H. Pylori status was    unknown. The patient's estimated blood loss was minimal (<5 mL). The   procedure was not difficult. The patient tolerated the procedure well.   There were no apparent adverse events.     Scope: Gastroscope  Scope Serial: [id number]    Events  Procedure Events   Event Event Time     Procedure Events   Event Event Time   SCOPE IN 1/10/2024 10:03 AM   SCOPE OUT 1/10/2024 10:16 AM     Findings  Performed multiple forceps biopsies in the upper third of the esophagus   and middle third of the esophagus to rule out eosinophilic esophagitis  Schatzki ring in the GE junction; dilated with David-Jesus dilator   from 54 Fr starting size to 54 Fr ending size  Mild, patchy erythematous mucosa in the antrum; no bleeding was   identified; performed cold forceps biopsy to rule out H. pylori  Performed multiple forceps biopsies in the 2nd part of the duodenum to   rule out celiac disease       Office Visit on 01/18/2024   Component Date Value Ref Range Status    Case Report 01/18/2024    Final                    Value:Pap Cytology                                      Case: [id number]                                   Authorizing Provider:  Eladio Coyle MD     Collected:           01/18/2024 03:06 PM          Ordering Location:     [facility] Received:            01/19/2024 08:06 AM                                 - Obstetrics And                                                                                    Gynecology                                                                   First Screen:          Mary Peacock CT(ASCP)                                                   Specimen:    Liquid-Based Pap Test, Screening, Cervix                                                   Interpretation 01/18/2024 Negative              Final    General Categorization 01/18/2024 Negative for intraepithelial lesion or malignancy   Final    Specimen Adequacy 01/18/2024 Satisfactory for evaluation   Final     Clinical Information 01/18/2024    Final                    Value:This result contains rich text formatting which cannot be displayed here.    Disclaimer 01/18/2024    Final                    Value:This result contains rich text formatting which cannot be displayed here.    HPV 16 01/18/2024 Negative  Negative, Invalid Final    HPV 18 01/18/2024 Negative  Negative, Invalid Final    HPV Other 01/18/2024 Positive (A)  Negative, Invalid Final   Hospital Outpatient Visit on 01/10/2024   Component Date Value Ref Range Status    POC Preg Test, Ur 01/10/2024 Negative  Negative Final     Acceptable 01/10/2024 Yes   Final    Case Report 01/10/2024    Final                    Value:Surgical Pathology                                Case: [id number]                                   Authorizing Provider:  Carson Castro MD     Collected:           01/10/2024 10:06 AM          Ordering Location:     [facility] -         Received:            01/10/2024 11:16 AM                                 Endoscopy                                                                    Pathologist:           Berto Ybarra MD                                                           Specimens:   A) - Duodenum, a. duodenum bx r/o celiac                                                            B) - Stomach, b. stomach bx r/o h. pylori                                                           C) - Esophagus, c. midesophagus bx r/o eoe                                                          D) - Esophagus, d. proximal esophagus bx ro eoe                                            Final Diagnosis 01/10/2024    Final                    Value:This result contains rich text formatting which cannot be displayed here.    Comments 01/10/2024    Final                    Value:This result contains rich text formatting which cannot be displayed here.    Gross Description 01/10/2024    Final                    Value:This result  contains rich text formatting which cannot be displayed here.    Microscopic Description 01/10/2024    Final                    Value:This result contains rich text formatting which cannot be displayed here.    Laboratory Notes 01/10/2024    Final                    Value:This result contains rich text formatting which cannot be displayed here.   Lab Visit on 11/29/2023   Component Date Value Ref Range Status    IgA 11/29/2023 126  61 - 356 mg/dL Final         No orders of the defined types were placed in this encounter.      Requested Prescriptions     Signed Prescriptions Disp Refills    baclofen (LIORESAL) 10 MG tablet 90 tablet 2     Sig: Take 1 tablet (10 mg total) by mouth 3 (three) times daily.    hydrOXYzine pamoate (VISTARIL) 25 MG Cap 90 capsule 2     Sig: Take 1 capsule (25 mg total) by mouth every 8 (eight) hours.       Assessment:     1. Thoracic radiculopathy    2. Thoracic spine pain    3. Chronic bilateral low back pain without sciatica         A's of Opioid Risk Assessment  Activity:Patient can perform ADL.   Analgesia:Patients pain is partially controlled by current medication. Patient has tried OTC medications such as Tylenol and Ibuprofen with out relief.   Adverse Effects: Patient denies constipation or sedation.  Aberrant Behavior:  reviewed with no aberrant drug seeking/taking behavior.  Overdose reversal drug naloxone discussed    Drug screen reviewed      Plan:    Not using narcotics from our office      History of Falcon gilles placement October 2021 subsequent explant of hardware due to pain    Requesting refill nonnarcotic medication     Requesting trial baclofen    Would like to continue current treatment plan    Continue current medication    Continue home exercise program as directed    Follow-up 6 months     Dr. Luciano, March 2024    Bring original prescription medication bottles/container/box with labels to each visit

## 2024-01-31 ENCOUNTER — OFFICE VISIT (OUTPATIENT)
Dept: PAIN MEDICINE | Facility: CLINIC | Age: 24
End: 2024-01-31
Payer: COMMERCIAL

## 2024-01-31 ENCOUNTER — PATIENT MESSAGE (OUTPATIENT)
Dept: PAIN MEDICINE | Facility: CLINIC | Age: 24
End: 2024-01-31
Payer: COMMERCIAL

## 2024-01-31 VITALS
HEIGHT: 66 IN | OXYGEN SATURATION: 98 % | SYSTOLIC BLOOD PRESSURE: 110 MMHG | RESPIRATION RATE: 18 BRPM | HEART RATE: 81 BPM | BODY MASS INDEX: 22.98 KG/M2 | WEIGHT: 143 LBS | DIASTOLIC BLOOD PRESSURE: 65 MMHG

## 2024-01-31 DIAGNOSIS — M54.50 CHRONIC BILATERAL LOW BACK PAIN WITHOUT SCIATICA: Chronic | ICD-10-CM

## 2024-01-31 DIAGNOSIS — M54.14 THORACIC RADICULOPATHY: Primary | Chronic | ICD-10-CM

## 2024-01-31 DIAGNOSIS — G89.29 CHRONIC BILATERAL LOW BACK PAIN WITHOUT SCIATICA: Chronic | ICD-10-CM

## 2024-01-31 DIAGNOSIS — M54.6 THORACIC SPINE PAIN: Chronic | ICD-10-CM

## 2024-01-31 PROCEDURE — 99999PBSHW PR PBB SHADOW TECHNICAL ONLY FILED TO HB: Mod: PBBFAC,,,

## 2024-01-31 PROCEDURE — 3078F DIAST BP <80 MM HG: CPT | Mod: CPTII,,, | Performed by: PHYSICIAN ASSISTANT

## 2024-01-31 PROCEDURE — 99214 OFFICE O/P EST MOD 30 MIN: CPT | Mod: S$PBB,25,, | Performed by: PHYSICIAN ASSISTANT

## 2024-01-31 PROCEDURE — 1159F MED LIST DOCD IN RCRD: CPT | Mod: CPTII,,, | Performed by: PHYSICIAN ASSISTANT

## 2024-01-31 PROCEDURE — 96372 THER/PROPH/DIAG INJ SC/IM: CPT | Mod: PBBFAC | Performed by: PHYSICIAN ASSISTANT

## 2024-01-31 PROCEDURE — 3008F BODY MASS INDEX DOCD: CPT | Mod: CPTII,,, | Performed by: PHYSICIAN ASSISTANT

## 2024-01-31 PROCEDURE — 99214 OFFICE O/P EST MOD 30 MIN: CPT | Mod: PBBFAC,25 | Performed by: PHYSICIAN ASSISTANT

## 2024-01-31 PROCEDURE — 3074F SYST BP LT 130 MM HG: CPT | Mod: CPTII,,, | Performed by: PHYSICIAN ASSISTANT

## 2024-01-31 RX ORDER — HYDROXYZINE PAMOATE 25 MG/1
25 CAPSULE ORAL EVERY 8 HOURS
Qty: 90 CAPSULE | Refills: 2 | Status: SHIPPED | OUTPATIENT
Start: 2024-01-31

## 2024-01-31 RX ORDER — KETOROLAC TROMETHAMINE 30 MG/ML
60 INJECTION, SOLUTION INTRAMUSCULAR; INTRAVENOUS
Status: COMPLETED | OUTPATIENT
Start: 2024-01-31 | End: 2024-01-31

## 2024-01-31 RX ORDER — BACLOFEN 10 MG/1
10 TABLET ORAL 3 TIMES DAILY
Qty: 90 TABLET | Refills: 2 | Status: SHIPPED | OUTPATIENT
Start: 2024-01-31

## 2024-01-31 RX ADMIN — KETOROLAC TROMETHAMINE 60 MG: 30 INJECTION, SOLUTION INTRAMUSCULAR at 10:01

## 2024-01-31 NOTE — LETTER
January 31, 2024      [facility]  [address] [place] [id number]  Phone: [phone]       Patient: Kd Joshua   YOB: 2000  Date of Visit: 01/31/2024    To Whom It May Concern:    Edgardo Joshua  was at [facility] on 01/31/2024. The patient may return to work/school on 02/01/2024. If you have any questions or concerns, or if I can be of further assistance, please do not hesitate to contact me.    Sincerely,    Jg Williamson PA

## 2024-02-08 ENCOUNTER — OFFICE VISIT (OUTPATIENT)
Dept: OBSTETRICS AND GYNECOLOGY | Facility: CLINIC | Age: 24
End: 2024-02-08
Payer: COMMERCIAL

## 2024-02-08 VITALS — DIASTOLIC BLOOD PRESSURE: 60 MMHG | SYSTOLIC BLOOD PRESSURE: 110 MMHG

## 2024-02-08 DIAGNOSIS — Z30.9 ENCOUNTER FOR CONTRACEPTIVE MANAGEMENT, UNSPECIFIED TYPE: Primary | ICD-10-CM

## 2024-02-08 PROCEDURE — 99213 OFFICE O/P EST LOW 20 MIN: CPT | Mod: PBBFAC | Performed by: OBSTETRICS & GYNECOLOGY

## 2024-02-08 PROCEDURE — 3078F DIAST BP <80 MM HG: CPT | Mod: CPTII,,, | Performed by: OBSTETRICS & GYNECOLOGY

## 2024-02-08 PROCEDURE — 1159F MED LIST DOCD IN RCRD: CPT | Mod: CPTII,,, | Performed by: OBSTETRICS & GYNECOLOGY

## 2024-02-08 PROCEDURE — 3074F SYST BP LT 130 MM HG: CPT | Mod: CPTII,,, | Performed by: OBSTETRICS & GYNECOLOGY

## 2024-02-08 PROCEDURE — 99212 OFFICE O/P EST SF 10 MIN: CPT | Mod: S$PBB,,, | Performed by: OBSTETRICS & GYNECOLOGY

## 2024-02-08 RX ORDER — NORETHINDRONE ACETATE AND ETHINYL ESTRADIOL .02; 1 MG/1; MG/1
1 TABLET ORAL DAILY
Qty: 30 TABLET | Refills: 11 | Status: SHIPPED | OUTPATIENT
Start: 2024-02-08 | End: 2025-02-07

## 2024-02-08 NOTE — PROGRESS NOTES
Subjective:       Patient ID: Kd Joshua is a 23 y.o. female.    Chief Complaint: Follow-up (Here to discuss tx options for endometriosis-doesn't want to have surgery. She is having side effects with ocp also. )    Presents for discussion only today.      Patient has a history of questionable endometriosis.  We initially placed her on the progestin only oral contraceptives.  This markedly decreased the intermittent swelling in the mons pubis area which she had been having.  In addition less menstrual pain.  However the side effects of progestin only pill is decreased libido.    We discussed options today.  After thorough discussion she desires trying a low-dose estrogen combination pill.      Review of Systems      Objective:      Physical Exam    Assessment:       No diagnosis found.    Plan:       There are no Patient Instructions on file for this visit.      
No

## 2024-02-08 NOTE — PATIENT INSTRUCTIONS
Prescription sent for Loestrin 1/20 to be taken as directed.      She has a follow-up appointment in May for repeat colposcopic exam.  She continues to have negative Pap but positive other HPV.

## 2024-02-09 ENCOUNTER — PATIENT MESSAGE (OUTPATIENT)
Dept: OBSTETRICS AND GYNECOLOGY | Facility: CLINIC | Age: 24
End: 2024-02-09
Payer: COMMERCIAL

## 2024-03-12 ENCOUNTER — OFFICE VISIT (OUTPATIENT)
Dept: OTOLARYNGOLOGY | Facility: CLINIC | Age: 24
End: 2024-03-12
Payer: COMMERCIAL

## 2024-03-12 VITALS — WEIGHT: 146 LBS | HEIGHT: 66 IN | BODY MASS INDEX: 23.46 KG/M2

## 2024-03-12 DIAGNOSIS — H61.22 HEARING LOSS DUE TO CERUMEN IMPACTION, LEFT: Primary | ICD-10-CM

## 2024-03-12 DIAGNOSIS — R09.81 CHRONIC NASAL CONGESTION: ICD-10-CM

## 2024-03-12 PROCEDURE — 69210 REMOVE IMPACTED EAR WAX UNI: CPT | Mod: S$PBB,,, | Performed by: OTOLARYNGOLOGY

## 2024-03-12 PROCEDURE — 3008F BODY MASS INDEX DOCD: CPT | Mod: CPTII,,, | Performed by: OTOLARYNGOLOGY

## 2024-03-12 PROCEDURE — 99213 OFFICE O/P EST LOW 20 MIN: CPT | Mod: PBBFAC | Performed by: OTOLARYNGOLOGY

## 2024-03-12 PROCEDURE — 69210 REMOVE IMPACTED EAR WAX UNI: CPT | Mod: PBBFAC,LT | Performed by: OTOLARYNGOLOGY

## 2024-03-12 PROCEDURE — 1160F RVW MEDS BY RX/DR IN RCRD: CPT | Mod: CPTII,,, | Performed by: OTOLARYNGOLOGY

## 2024-03-12 PROCEDURE — 1159F MED LIST DOCD IN RCRD: CPT | Mod: CPTII,,, | Performed by: OTOLARYNGOLOGY

## 2024-03-12 PROCEDURE — 99204 OFFICE O/P NEW MOD 45 MIN: CPT | Mod: 25,S$PBB,, | Performed by: OTOLARYNGOLOGY

## 2024-03-12 NOTE — PROGRESS NOTES
Subjective:       Patient ID: Kd Joshua is a 23 y.o. female.    Chief Complaint: Cerumen Impaction (Bilateral ear cleaning. ) and Other (Pt states she broke her nose over 10 years ago when she fell and hit a dresser. Now, frequently has breathing difficulties through her nose and states her nose pops. )    HPI  Review of Systems   HENT:  Positive for congestion and hearing loss.    All other systems reviewed and are negative.      Objective:      Physical Exam  General: NAD  Head: Normocephalic, atraumatic, no facial asymmetry/normal strength,  Ears: Both auricules normal in appearance, w/o deformities tympanic membranes normal external auditory canals wax impaction left   Nose: External nose w/o deformities swollen  turbinates no drainage or inflammation  Oral Cavity: Lips, gums, floor of mouth, tongue hard palate, and buccal mucosa without mass/lesion  Oropharynx: Mucosa pink and moist, soft palate, posterior pharynx and oropharyngeal wall without mass/lesion  Neck: Supple, symmetric, trachea midline, no palpable mass/lesion, no palpable cervical lymphadenopathy  Skin: Warm and dry, no concerning lesions  Respiratory: Respirations even, unlabored    Procedure: Binocular microscopy with removal of cerumen impaction using microsurgical instrumentation.  After explaining the procedure and obtaining verbal assent,  the left external auditory canal visualized with the 250 mm focal length lens through the operating microscope. The obstructing cerumen was removed with microsurgical instrumentation to reveal normal and healthy external auditory canal. The patient tolerated this procedure well without complication.    Assessment:       1. Hearing loss due to cerumen impaction, left    2. Chronic nasal congestion        Plan:       Discussed nasal concerns in detail   rec astelin nose spray pt refused

## 2024-03-18 ENCOUNTER — PATIENT MESSAGE (OUTPATIENT)
Dept: OTOLARYNGOLOGY | Facility: CLINIC | Age: 24
End: 2024-03-18
Payer: COMMERCIAL

## 2024-04-10 ENCOUNTER — OFFICE VISIT (OUTPATIENT)
Dept: OTOLARYNGOLOGY | Facility: CLINIC | Age: 24
End: 2024-04-10
Payer: COMMERCIAL

## 2024-04-10 VITALS — WEIGHT: 146 LBS | BODY MASS INDEX: 23.46 KG/M2 | HEIGHT: 66 IN

## 2024-04-10 DIAGNOSIS — J32.9 CHRONIC SINUSITIS, UNSPECIFIED LOCATION: ICD-10-CM

## 2024-04-10 DIAGNOSIS — R09.81 CHRONIC NASAL CONGESTION: Primary | ICD-10-CM

## 2024-04-10 PROCEDURE — 99213 OFFICE O/P EST LOW 20 MIN: CPT | Mod: PBBFAC | Performed by: OTOLARYNGOLOGY

## 2024-04-10 PROCEDURE — 3008F BODY MASS INDEX DOCD: CPT | Mod: CPTII,,, | Performed by: OTOLARYNGOLOGY

## 2024-04-10 PROCEDURE — 99214 OFFICE O/P EST MOD 30 MIN: CPT | Mod: S$PBB,,, | Performed by: OTOLARYNGOLOGY

## 2024-04-10 PROCEDURE — 1160F RVW MEDS BY RX/DR IN RCRD: CPT | Mod: CPTII,,, | Performed by: OTOLARYNGOLOGY

## 2024-04-10 PROCEDURE — 1159F MED LIST DOCD IN RCRD: CPT | Mod: CPTII,,, | Performed by: OTOLARYNGOLOGY

## 2024-04-10 NOTE — PROGRESS NOTES
"Subjective:       Patient ID: Kd Joshua is a 24 y.o. female.    Chief Complaint: Nose Problem (Patient states she hit her nose on the corner of a dresser about 10 years ago. Since then, she has been having problems breathing out of her left nostril. She has to "pop" her septum in order to get air flow through her left nostril. She is using nasal spray but that doesn't help.)    HPI  Review of Systems   HENT:  Positive for congestion.    All other systems reviewed and are negative.      Objective:      Physical Exam  General: NAD  Head: Normocephalic, atraumatic, no facial asymmetry/normal strength,  Ears: Both auricules normal in appearance, w/o deformities tympanic membranes normal external auditory canals normal  Nose: External nose w/o deformities normal turbinates no drainage or inflammation  Oral Cavity: Lips, gums, floor of mouth, tongue hard palate, and buccal mucosa without mass/lesion  Oropharynx: Mucosa pink and moist, soft palate, posterior pharynx and oropharyngeal wall without mass/lesion  Neck: Supple, symmetric, trachea midline, no palpable mass/lesion, no palpable cervical lymphadenopathy  Skin: Warm and dry, no concerning lesions  Respiratory: Respirations even, unlabored  Assessment:       1. Chronic nasal congestion        Plan:     Will get CT sinus first   Will refer to [facility] for further evaluation of nasal obstruction     "

## 2024-04-10 NOTE — ADDENDUM NOTE
Addended by: DEBORAH EASTMAN on: 4/10/2024 08:42 AM     Modules accepted: Orders, Level of Service

## 2024-04-14 ENCOUNTER — OFFICE VISIT (OUTPATIENT)
Dept: FAMILY MEDICINE | Facility: CLINIC | Age: 24
End: 2024-04-14
Payer: COMMERCIAL

## 2024-04-14 VITALS
HEART RATE: 75 BPM | TEMPERATURE: 99 F | BODY MASS INDEX: 21.53 KG/M2 | WEIGHT: 134 LBS | SYSTOLIC BLOOD PRESSURE: 135 MMHG | HEIGHT: 66 IN | RESPIRATION RATE: 19 BRPM | DIASTOLIC BLOOD PRESSURE: 82 MMHG

## 2024-04-14 DIAGNOSIS — R11.0 NAUSEA: Primary | ICD-10-CM

## 2024-04-14 LAB
B-HCG UR QL: NEGATIVE
BILIRUB SERPL-MCNC: NEGATIVE MG/DL
BLOOD URINE, POC: NORMAL
COLOR, POC UA: YELLOW
CTP QC/QA: YES
GLUCOSE UR QL STRIP: NEGATIVE
KETONES UR QL STRIP: NORMAL
LEUKOCYTE ESTERASE URINE, POC: NEGATIVE
NITRITE, POC UA: NEGATIVE
PH, POC UA: 7.5
PROTEIN, POC: NEGATIVE
SPECIFIC GRAVITY, POC UA: 1.01
UROBILINOGEN, POC UA: 0.2

## 2024-04-14 PROCEDURE — 3075F SYST BP GE 130 - 139MM HG: CPT | Mod: CPTII,,, | Performed by: FAMILY MEDICINE

## 2024-04-14 PROCEDURE — 81003 URINALYSIS AUTO W/O SCOPE: CPT | Mod: QW,,, | Performed by: FAMILY MEDICINE

## 2024-04-14 PROCEDURE — 81025 URINE PREGNANCY TEST: CPT | Mod: QW,,, | Performed by: FAMILY MEDICINE

## 2024-04-14 PROCEDURE — 99213 OFFICE O/P EST LOW 20 MIN: CPT | Mod: ,,, | Performed by: FAMILY MEDICINE

## 2024-04-14 PROCEDURE — 99051 MED SERV EVE/WKEND/HOLIDAY: CPT | Mod: ,,, | Performed by: FAMILY MEDICINE

## 2024-04-14 PROCEDURE — 3008F BODY MASS INDEX DOCD: CPT | Mod: CPTII,,, | Performed by: FAMILY MEDICINE

## 2024-04-14 PROCEDURE — 1160F RVW MEDS BY RX/DR IN RCRD: CPT | Mod: CPTII,,, | Performed by: FAMILY MEDICINE

## 2024-04-14 PROCEDURE — 3079F DIAST BP 80-89 MM HG: CPT | Mod: CPTII,,, | Performed by: FAMILY MEDICINE

## 2024-04-14 PROCEDURE — 1159F MED LIST DOCD IN RCRD: CPT | Mod: CPTII,,, | Performed by: FAMILY MEDICINE

## 2024-04-14 RX ORDER — PROMETHAZINE HYDROCHLORIDE 25 MG/1
25 TABLET ORAL EVERY 6 HOURS PRN
Qty: 30 TABLET | Refills: 0 | Status: SHIPPED | OUTPATIENT
Start: 2024-04-14

## 2024-04-14 NOTE — LETTER
April 14, 2024      [facility]  [address] [facility] [address]  Phone: [phone]  Fax: [phone]       Patient: Kd Joshua   YOB: 2000  Date of Visit: 04/14/2024    To Whom It May Concern:    Edgardo Joshua  was at [facility] on 04/14/2024. The patient may return to work/school on 4/15/24 with no restrictions. If you have any questions or concerns, or if I can be of further assistance, please do not hesitate to contact me.    Sincerely,    Patricia Dasilva LPN

## 2024-04-14 NOTE — PROGRESS NOTES
Subjective:       Patient ID: Kd Joshua is a 24 y.o. female.    Chief Complaint: Nausea (Pt states she need something for nausea for her cycle.)    Nausea  Associated symptoms include nausea. Pertinent negatives include no abdominal pain, arthralgias, change in bowel habit, chest pain, chills, congestion, coughing, diaphoresis, fatigue, fever, headaches, joint swelling, myalgias, neck pain, numbness, rash, sore throat, vertigo, vomiting or weakness.     Review of Systems   Constitutional:  Negative for activity change, appetite change, chills, diaphoresis, fatigue, fever and unexpected weight change.   HENT:  Negative for nasal congestion, dental problem, drooling, ear discharge, ear pain, facial swelling, hearing loss, mouth sores, nosebleeds, postnasal drip, rhinorrhea, sinus pressure/congestion, sneezing, sore throat, tinnitus, trouble swallowing, voice change and goiter.    Eyes:  Negative for photophobia, discharge, itching and visual disturbance.   Respiratory:  Negative for apnea, cough, choking, chest tightness, shortness of breath, wheezing and stridor.    Cardiovascular:  Negative for chest pain, palpitations, leg swelling and claudication.   Gastrointestinal:  Positive for nausea. Negative for abdominal distention, abdominal pain, anal bleeding, blood in stool, change in bowel habit, constipation, diarrhea and vomiting.   Endocrine: Negative for cold intolerance, heat intolerance, polydipsia, polyphagia and polyuria.   Genitourinary:  Negative for bladder incontinence, decreased urine volume, difficulty urinating, dysuria, enuresis, flank pain, frequency, hematuria, nocturia, pelvic pain and urgency.   Musculoskeletal:  Negative for arthralgias, back pain, gait problem, joint swelling, leg pain, myalgias, neck pain, neck stiffness and joint deformity.   Integumentary:  Negative for pallor, rash, wound, breast mass and breast tenderness.   Allergic/Immunologic: Negative for environmental allergies,  food allergies and immunocompromised state.   Neurological:  Negative for dizziness, vertigo, tremors, seizures, syncope, facial asymmetry, speech difficulty, weakness, light-headedness, numbness, headaches, memory loss and coordination difficulties.   Hematological:  Negative for adenopathy. Does not bruise/bleed easily.   Psychiatric/Behavioral:  Negative for agitation, behavioral problems, confusion, decreased concentration, dysphoric mood, hallucinations, self-injury, sleep disturbance and suicidal ideas. The patient is not nervous/anxious and is not hyperactive.    Breast: Negative for mass and tenderness        Objective:      Physical Exam  Vitals reviewed.   Constitutional:       Appearance: Normal appearance.   HENT:      Head: Normocephalic and atraumatic.      Right Ear: Tympanic membrane, ear canal and external ear normal.      Left Ear: Tympanic membrane, ear canal and external ear normal.      Nose: Nose normal.      Mouth/Throat:      Mouth: Mucous membranes are moist.      Pharynx: Oropharynx is clear.   Eyes:      Extraocular Movements: Extraocular movements intact.      Conjunctiva/sclera: Conjunctivae normal.      Pupils: Pupils are equal, round, and reactive to light.   Cardiovascular:      Rate and Rhythm: Normal rate and regular rhythm.      Pulses: Normal pulses.      Heart sounds: Normal heart sounds.   Pulmonary:      Effort: Pulmonary effort is normal.      Breath sounds: Normal breath sounds.   Abdominal:      General: Bowel sounds are normal.      Palpations: Abdomen is soft.   Musculoskeletal:         General: Normal range of motion.      Cervical back: Normal range of motion and neck supple.   Skin:     General: Skin is warm and dry.   Neurological:      General: No focal deficit present.      Mental Status: She is alert. Mental status is at baseline.   Psychiatric:         Mood and Affect: Mood normal.         Behavior: Behavior normal.         Thought Content: Thought content normal.          Judgment: Judgment normal.         Assessment:       1. Nausea        Plan:     Nausea  -     POCT urine pregnancy  -     POCT URINALYSIS W/O SCOPE    Other orders  -     promethazine (PHENERGAN) 25 MG tablet; Take 1 tablet (25 mg total) by mouth every 6 (six) hours as needed for Nausea.  Dispense: 30 tablet; Refill: 0       Will give phenergan for prn nausea.

## 2024-04-17 ENCOUNTER — OFFICE VISIT (OUTPATIENT)
Dept: OBSTETRICS AND GYNECOLOGY | Facility: CLINIC | Age: 24
End: 2024-04-17
Payer: COMMERCIAL

## 2024-04-17 DIAGNOSIS — L73.9 FOLLICULITIS: Primary | ICD-10-CM

## 2024-04-17 PROCEDURE — 1159F MED LIST DOCD IN RCRD: CPT | Mod: CPTII,,, | Performed by: OBSTETRICS & GYNECOLOGY

## 2024-04-17 PROCEDURE — 99212 OFFICE O/P EST SF 10 MIN: CPT | Mod: S$PBB,,, | Performed by: OBSTETRICS & GYNECOLOGY

## 2024-04-17 PROCEDURE — 99213 OFFICE O/P EST LOW 20 MIN: CPT | Mod: PBBFAC | Performed by: OBSTETRICS & GYNECOLOGY

## 2024-04-17 RX ORDER — CEFUROXIME AXETIL 500 MG/1
500 TABLET ORAL EVERY 12 HOURS
Qty: 10 TABLET | Refills: 0 | Status: SHIPPED | OUTPATIENT
Start: 2024-04-17

## 2024-04-17 NOTE — PROGRESS NOTES
Subjective:       Patient ID: Kd Joshua is a 24 y.o. female.    Chief Complaint: skin nodule (Pt presents c/o tender nodule on left mon pubis/groin since 4-1-2024.)    Presents complaining of some pain in the left inguinal area.      Present since a proximally April 1st.        Review of Systems      Objective:      Physical Exam  Genitourinary:     Comments: In the left inguinal area at the insertion of the abductor muscle onto the pubic rami there was a minute 4 mm slightly tender nodule.      There has no fluctuance there has no redness of the skin.              Assessment:       1. Folliculitis        Plan:       Patient Instructions   Discussed this is probably a minute area of folliculitis.      There was not enough fluctuance to lanced today.  The tiny nodule is only a proximally 4 mm.      Will empirically place on Ceftin b.i.d. x5 days.    She will continue warm soaks.    Notify me if any worsening condition.    She has follow-up appointment with me May 21st for colposcopic exam.  Previous persistent positive other HPV.  However she has had the Gardasil vaccination series

## 2024-04-17 NOTE — PATIENT INSTRUCTIONS
Discussed this is probably a minute area of folliculitis.      There was not enough fluctuance to lanced today.  The tiny nodule is only a proximally 4 mm.      Will empirically place on Ceftin b.i.d. x5 days.    She will continue warm soaks.    Notify me if any worsening condition.    She has follow-up appointment with me May 21st for colposcopic exam.  Previous persistent positive other HPV.  However she has had the Gardasil vaccination series

## 2024-04-25 ENCOUNTER — HOSPITAL ENCOUNTER (OUTPATIENT)
Dept: RADIOLOGY | Facility: HOSPITAL | Age: 24
Discharge: HOME | End: 2024-04-25
Attending: OTOLARYNGOLOGY
Payer: COMMERCIAL

## 2024-04-25 DIAGNOSIS — J32.9 CHRONIC SINUSITIS, UNSPECIFIED LOCATION: ICD-10-CM

## 2024-04-25 PROCEDURE — 70486 CT MAXILLOFACIAL W/O DYE: CPT | Mod: 26,,, | Performed by: RADIOLOGY

## 2024-04-25 PROCEDURE — 70486 CT MAXILLOFACIAL W/O DYE: CPT | Mod: TC

## 2024-07-17 NOTE — PROGRESS NOTES
Subjective:         Patient ID: Kd Joshua is a 24 y.o. female.    Chief Complaint: Back Pain      Pain  This is a chronic problem. The current episode started more than 1 year ago. The problem occurs daily. The problem has been unchanged. Associated symptoms include arthralgias. Pertinent negatives include no anorexia, change in bowel habit, chest pain, chills, coughing, diaphoresis, fever, neck pain, sore throat, swollen glands, urinary symptoms, vertigo or vomiting.     Review of Systems   Constitutional:  Negative for activity change, appetite change, chills, diaphoresis, fever and unexpected weight change.   HENT:  Negative for drooling, ear discharge, ear pain, facial swelling, nosebleeds, sore throat, trouble swallowing, voice change and goiter.    Eyes:  Negative for photophobia, pain, discharge, redness and visual disturbance.   Respiratory:  Negative for apnea, cough, choking, chest tightness, shortness of breath, wheezing and stridor.    Cardiovascular:  Negative for chest pain, palpitations and leg swelling.   Gastrointestinal:  Negative for abdominal distention, anorexia, change in bowel habit, diarrhea, rectal pain, vomiting and fecal incontinence.   Endocrine: Negative for cold intolerance, heat intolerance, polydipsia, polyphagia and polyuria.   Genitourinary:  Negative for bladder incontinence, dysuria, flank pain, frequency and hot flashes.   Musculoskeletal:  Positive for arthralgias, back pain and leg pain. Negative for neck pain.   Integumentary:  Negative for color change and pallor.   Allergic/Immunologic: Negative for immunocompromised state.   Neurological:  Negative for dizziness, vertigo, seizures, syncope, facial asymmetry, speech difficulty, light-headedness, memory loss and coordination difficulties.   Hematological:  Negative for adenopathy. Does not bruise/bleed easily.   Psychiatric/Behavioral:  Negative for agitation, behavioral problems, confusion, decreased concentration,  dysphoric mood, hallucinations, self-injury and suicidal ideas. The patient is not nervous/anxious and is not hyperactive.            Past Medical History:   Diagnosis Date    Amenorrhea 2014    Started to regulate after 2years on depo (2019)    Anxiety 2020    Chlamydia infection     Depression 2020    Dyspareunia 2020    Endometriosis, herpes, birth control, cysts    Endometriosis     Heart murmur 2017    Benign; mother states has resolved since childhood    Herpes simplex virus (HSV) infection     Mental disorder     anexity and depression    Ovarian cyst     Scoliosis     STD (sexually transmitted disease) 2020    Herpes     Past Surgical History:   Procedure Laterality Date    ENDOMETRIAL ABLATION  2016    HYSTEROSCOPY      PELVIC LAPAROSCOPY  2022    SPINAL FUSION  10/03/2017    SURGICAL REMOVAL OF ENDOMETRIOSIS  2014    TONSILLECTOMY  2014     Social History     Socioeconomic History    Marital status: Single   Tobacco Use    Smoking status: Every Day     Current packs/day: 0.25     Types: Cigarettes    Smokeless tobacco: Never   Substance and Sexual Activity    Alcohol use: Not Currently     Comment: rare    Drug use: Not Currently     Types: Marijuana     Comment: every couple of days    Sexual activity: Yes     Partners: Male     Birth control/protection: Condom, Rhythm, None     Comment: Last sexual intercourse 9/9/2021     Family History   Problem Relation Name Age of Onset    Lupus Paternal Grandmother      Ovarian cancer Maternal Grandmother Daphney     Cancer Maternal Grandmother Daphney         Ovarian cancer    Diabetes Maternal Grandfather Horacio     Heart failure Maternal Grandfather Horacio     Breast cancer Maternal Aunt Ayala         Breast cancer reoccurring    Cancer Maternal Aunt Ayala     Cancer Maternal Uncle      Cancer Paternal Aunt Nataly     Diabetes Paternal Aunt Nataly         Diabetes    Heart failure Father Matty     Cancer Maternal Aunt Sharron          "Breast cancer reoccurring    Heart failure Maternal Aunt Evy      Review of patient's allergies indicates:   Allergen Reactions    Codeine     Oxycodone Itching        Objective:  Vitals:    07/22/24 0904   BP: 116/68   Pulse: 91   Resp: 16   Weight: 61.7 kg (136 lb)   Height: 5' 6" (1.676 m)   PainSc:   7             Physical Exam  Vitals and nursing note reviewed. Exam conducted with a chaperone present.   Constitutional:       General: She is awake. She is not in acute distress.     Appearance: Normal appearance. She is not ill-appearing, toxic-appearing or diaphoretic.   HENT:      Head: Normocephalic and atraumatic.      Nose: Nose normal.      Mouth/Throat:      Mouth: Mucous membranes are moist.      Pharynx: Oropharynx is clear.   Eyes:      Conjunctiva/sclera: Conjunctivae normal.      Pupils: Pupils are equal, round, and reactive to light.   Cardiovascular:      Rate and Rhythm: Normal rate.   Pulmonary:      Effort: Pulmonary effort is normal. No respiratory distress.   Abdominal:      Palpations: Abdomen is soft.      Tenderness: There is no guarding.   Musculoskeletal:         General: Normal range of motion.      Cervical back: Normal range of motion and neck supple. No rigidity.   Skin:     General: Skin is warm and dry.      Coloration: Skin is not jaundiced or pale.   Neurological:      General: No focal deficit present.      Mental Status: She is alert and oriented to person, place, and time. Mental status is at baseline.      Cranial Nerves: No cranial nerve deficit (II-XII).   Psychiatric:         Mood and Affect: Mood normal.         Behavior: Behavior normal. Behavior is cooperative.         Thought Content: Thought content normal.         CT Sinuses without Contrast  Narrative: EXAMINATION:  CT SINUSES WITHOUT CONTRAST    CLINICAL HISTORY:  Sinusitis, chronic or recurrent;  Chronic sinusitis, unspecified    TECHNIQUE:  CT of the paranasal sinuses performed without intravenous " contrast.    The CT examination was performed using one or more of the following dose reduction techniques: Automated exposure control, adjustment of the mA and kV according to patient's size, use of acute or iterative reconstruction techniques.    COMPARISON:  12/14/2021    FINDINGS:  The frontal, ethmoid, and sphenoid sinuses are clear bilaterally.  Maxillary sinus clear bilaterally.  Drainage pathways are clear.  No air-fluid level.  No osseous erosion or osseous thickening.  Impression: Paranasal sinuses are clear bilaterally.    Electronically signed by: Angel Conroy  Date:    04/25/2024  Time:    14:22       Office Visit on 04/14/2024   Component Date Value Ref Range Status    POC Preg Test, Ur 04/14/2024 Negative  Negative Final     Acceptable 04/14/2024 Yes   Final    Color, UA 04/14/2024 Yellow   Final    Spec Grav UA 04/14/2024 1.015   Final    pH, UA 04/14/2024 7.5   Final    WBC, UA 04/14/2024 negative   Final    Nitrite, UA 04/14/2024 negative   Final    Protein, POC 04/14/2024 negative   Final    Glucose, UA 04/14/2024 negative   Final    Ketones, UA 04/14/2024 40mg   Final    Bilirubin, POC 04/14/2024 negative   Final    Urobilinogen, UA 04/14/2024 0.2   Final    Blood, UA 04/14/2024 moderate   Final         No orders of the defined types were placed in this encounter.      Requested Prescriptions     Signed Prescriptions Disp Refills    amitriptyline (ELAVIL) 25 MG tablet 30 tablet 11     Sig: Take 1 tablet (25 mg total) by mouth nightly.    baclofen (LIORESAL) 10 MG tablet 90 tablet 11     Sig: Take 1 tablet (10 mg total) by mouth 3 (three) times daily.    ibuprofen (ADVIL,MOTRIN) 800 MG tablet 60 tablet 11     Sig: Take 1 tablet (800 mg total) by mouth every 8 (eight) hours as needed for Pain. Take 1 tablet every 8 hours during menstrual cycles for up to 7 days. Take with food.       Assessment:     1. Scoliosis of lumbar spine, unspecified scoliosis type    2. Thoracic  spine pain    3. Chronic bilateral low back pain without sciatica                 Plan:    Not using narcotics from our office      History of Falcon gilles placement October 2021 subsequent explant of hardware due to pain    Requesting refill nonnarcotic medication     Requesting Toradol Injection for back pain related to her Falcon gilles placement     Toradol 60 mg IM, tolerated well    Otherwise she states current medications helping control her discomfort    Would like to continue current treatment plan    Continue current medication    Continue home exercise program as directed    Follow-up 12 months    Dr. Luciano, March 2024    Bring original prescription medication bottles/container/box with labels to each visit

## 2024-07-22 ENCOUNTER — OFFICE VISIT (OUTPATIENT)
Dept: PAIN MEDICINE | Facility: CLINIC | Age: 24
End: 2024-07-22
Payer: COMMERCIAL

## 2024-07-22 VITALS
HEART RATE: 91 BPM | HEIGHT: 66 IN | WEIGHT: 136 LBS | BODY MASS INDEX: 21.86 KG/M2 | RESPIRATION RATE: 16 BRPM | DIASTOLIC BLOOD PRESSURE: 68 MMHG | SYSTOLIC BLOOD PRESSURE: 116 MMHG

## 2024-07-22 DIAGNOSIS — G89.29 CHRONIC BILATERAL LOW BACK PAIN WITHOUT SCIATICA: Chronic | ICD-10-CM

## 2024-07-22 DIAGNOSIS — M54.50 CHRONIC BILATERAL LOW BACK PAIN WITHOUT SCIATICA: Chronic | ICD-10-CM

## 2024-07-22 DIAGNOSIS — M41.9 SCOLIOSIS OF LUMBAR SPINE, UNSPECIFIED SCOLIOSIS TYPE: Primary | Chronic | ICD-10-CM

## 2024-07-22 DIAGNOSIS — M54.6 THORACIC SPINE PAIN: Chronic | ICD-10-CM

## 2024-07-22 PROCEDURE — 99999 PR PBB SHADOW E&M-EST. PATIENT-LVL IV: CPT | Mod: PBBFAC,,, | Performed by: PHYSICIAN ASSISTANT

## 2024-07-22 PROCEDURE — 3008F BODY MASS INDEX DOCD: CPT | Mod: CPTII,,, | Performed by: PHYSICIAN ASSISTANT

## 2024-07-22 PROCEDURE — 99999PBSHW PR PBB SHADOW TECHNICAL ONLY FILED TO HB: Mod: PBBFAC,,,

## 2024-07-22 PROCEDURE — 96372 THER/PROPH/DIAG INJ SC/IM: CPT | Mod: PBBFAC | Performed by: PHYSICIAN ASSISTANT

## 2024-07-22 PROCEDURE — 99214 OFFICE O/P EST MOD 30 MIN: CPT | Mod: S$PBB,25,, | Performed by: PHYSICIAN ASSISTANT

## 2024-07-22 PROCEDURE — 3074F SYST BP LT 130 MM HG: CPT | Mod: CPTII,,, | Performed by: PHYSICIAN ASSISTANT

## 2024-07-22 PROCEDURE — 99214 OFFICE O/P EST MOD 30 MIN: CPT | Mod: PBBFAC,25 | Performed by: PHYSICIAN ASSISTANT

## 2024-07-22 PROCEDURE — 1159F MED LIST DOCD IN RCRD: CPT | Mod: CPTII,,, | Performed by: PHYSICIAN ASSISTANT

## 2024-07-22 PROCEDURE — 3078F DIAST BP <80 MM HG: CPT | Mod: CPTII,,, | Performed by: PHYSICIAN ASSISTANT

## 2024-07-22 RX ORDER — AMITRIPTYLINE HYDROCHLORIDE 25 MG/1
25 TABLET, FILM COATED ORAL NIGHTLY
Qty: 30 TABLET | Refills: 11 | Status: SHIPPED | OUTPATIENT
Start: 2024-07-22

## 2024-07-22 RX ORDER — BACLOFEN 10 MG/1
10 TABLET ORAL 3 TIMES DAILY
Qty: 90 TABLET | Refills: 11 | Status: SHIPPED | OUTPATIENT
Start: 2024-07-22

## 2024-07-22 RX ORDER — KETOROLAC TROMETHAMINE 30 MG/ML
60 INJECTION, SOLUTION INTRAMUSCULAR; INTRAVENOUS
Status: COMPLETED | OUTPATIENT
Start: 2024-07-22 | End: 2024-07-22

## 2024-07-22 RX ORDER — IBUPROFEN 800 MG/1
800 TABLET ORAL EVERY 8 HOURS PRN
Qty: 60 TABLET | Refills: 11 | Status: SHIPPED | OUTPATIENT
Start: 2024-07-22 | End: 2025-07-22

## 2024-07-22 RX ADMIN — KETOROLAC TROMETHAMINE 60 MG: 30 INJECTION, SOLUTION INTRAMUSCULAR at 09:07

## 2024-08-15 ENCOUNTER — PATIENT MESSAGE (OUTPATIENT)
Dept: PAIN MEDICINE | Facility: CLINIC | Age: 24
End: 2024-08-15
Payer: COMMERCIAL

## 2024-08-21 ENCOUNTER — OFFICE VISIT (OUTPATIENT)
Dept: GASTROENTEROLOGY | Facility: CLINIC | Age: 24
End: 2024-08-21
Payer: COMMERCIAL

## 2024-08-21 VITALS
BODY MASS INDEX: 21.34 KG/M2 | SYSTOLIC BLOOD PRESSURE: 107 MMHG | HEART RATE: 89 BPM | WEIGHT: 132.81 LBS | OXYGEN SATURATION: 100 % | DIASTOLIC BLOOD PRESSURE: 73 MMHG | HEIGHT: 66 IN

## 2024-08-21 DIAGNOSIS — K58.1 IRRITABLE BOWEL SYNDROME WITH CONSTIPATION: Primary | ICD-10-CM

## 2024-08-21 PROCEDURE — 3008F BODY MASS INDEX DOCD: CPT | Mod: CPTII,,, | Performed by: INTERNAL MEDICINE

## 2024-08-21 PROCEDURE — 99214 OFFICE O/P EST MOD 30 MIN: CPT | Mod: S$PBB,,, | Performed by: INTERNAL MEDICINE

## 2024-08-21 PROCEDURE — 1159F MED LIST DOCD IN RCRD: CPT | Mod: CPTII,,, | Performed by: INTERNAL MEDICINE

## 2024-08-21 PROCEDURE — 3074F SYST BP LT 130 MM HG: CPT | Mod: CPTII,,, | Performed by: INTERNAL MEDICINE

## 2024-08-21 PROCEDURE — 99214 OFFICE O/P EST MOD 30 MIN: CPT | Mod: PBBFAC | Performed by: INTERNAL MEDICINE

## 2024-08-21 PROCEDURE — 3078F DIAST BP <80 MM HG: CPT | Mod: CPTII,,, | Performed by: INTERNAL MEDICINE

## 2024-08-21 PROCEDURE — 99999 PR PBB SHADOW E&M-EST. PATIENT-LVL IV: CPT | Mod: PBBFAC,,, | Performed by: INTERNAL MEDICINE

## 2024-08-21 NOTE — PROGRESS NOTES
"CC: abdominal pain    HPI 24 y.o. female with endometriosis, anxiety, depression, here with complaints of abdominal pain associated with bloating and constipation. She has tried several OTC regimens including miralax, stool softeners and laxatives without any improvement in symptoms. She states that she has had a long history of IBS. She reports epigastric abdominal pain associated with reflux and intermittent dysphagia that has been otherwise stable. Notes alternating bowel habits with constipation and BMs every other day. No blood in stool. No family history of colon cancer. EGD 1/10/24 with normal esophagus, Schatzki ring in GEJ, dilated with 54 Fr Savary, gastritis, normal duodenum. Path negative for celiac disease, h.pylori or EoE.    Past Medical History:   Diagnosis Date    Amenorrhea 2014    Started to regulate after 2years on depo (2019)    Anxiety 2020    Chlamydia infection     Depression 2020    Dyspareunia 2020    Endometriosis, herpes, birth control, cysts    Endometriosis     Heart murmur 2017    Benign; mother states has resolved since childhood    Herpes simplex virus (HSV) infection     Mental disorder     anexity and depression    Ovarian cyst     Scoliosis     STD (sexually transmitted disease) 2020    Herpes     Physical Examination  /73   Pulse 89   Ht 5' 6" (1.676 m)   Wt 60.2 kg (132 lb 12.8 oz)   SpO2 100%   BMI 21.43 kg/m²   General appearance: alert, cooperative, no distress  HENT: Normocephalic, atraumatic, neck symmetrical  Eyes: conjunctivae clear  Lungs: No labored breathing  Skin: No jaundice  Neurologic: Alert and oriented X 3    Labs:  Lab Results   Component Value Date    WBC 6.28 05/11/2023    HGB 14.8 05/11/2023    HCT 47.5 (H) 05/11/2023    MCV 95.4 05/11/2023     05/11/2023     Imaging:  US Pelvis: Left ovarian cyst  Independently reviewed    Assessment:   GERD  Intermittent dysphagia  IBS-Constipation    Plan:   -Continue protonix 40 mg PO daily for " GERD  -Trial of Linzess 145 mcg PO daily  - If no improvement may consider colonoscopy, but would recommend trials of medications first      30 minutes of total time spent on the encounter, which includes face to face time and non-face to face time preparing to see the patient (eg, review of tests), obtaining and/or reviewing separately obtained history, documenting clinical information in the electronic or other health record, Independently interpreting results (not separately reported) and communicating results to the patient/family/caregiver, or care coordination (not separately reported).        Carson Castro MD  [facility]

## 2024-08-23 ENCOUNTER — PATIENT MESSAGE (OUTPATIENT)
Dept: PAIN MEDICINE | Facility: CLINIC | Age: 24
End: 2024-08-23
Payer: COMMERCIAL

## 2024-08-23 DIAGNOSIS — G89.29 CHRONIC BILATERAL LOW BACK PAIN WITHOUT SCIATICA: Chronic | ICD-10-CM

## 2024-08-23 DIAGNOSIS — M54.50 CHRONIC BILATERAL LOW BACK PAIN WITHOUT SCIATICA: Chronic | ICD-10-CM

## 2024-08-23 DIAGNOSIS — M54.14 THORACIC RADICULOPATHY: Chronic | ICD-10-CM

## 2024-08-23 DIAGNOSIS — M54.6 THORACIC SPINE PAIN: Chronic | ICD-10-CM

## 2024-08-26 RX ORDER — PREGABALIN 50 MG/1
50 CAPSULE ORAL 3 TIMES DAILY
Qty: 60 CAPSULE | Refills: 5 | Status: SHIPPED | OUTPATIENT
Start: 2024-08-26

## 2024-08-28 ENCOUNTER — OFFICE VISIT (OUTPATIENT)
Dept: OBSTETRICS AND GYNECOLOGY | Facility: CLINIC | Age: 24
End: 2024-08-28
Payer: COMMERCIAL

## 2024-08-28 ENCOUNTER — PATIENT MESSAGE (OUTPATIENT)
Dept: PAIN MEDICINE | Facility: CLINIC | Age: 24
End: 2024-08-28
Payer: COMMERCIAL

## 2024-08-28 VITALS
BODY MASS INDEX: 21.02 KG/M2 | HEIGHT: 66 IN | WEIGHT: 130.81 LBS | DIASTOLIC BLOOD PRESSURE: 60 MMHG | SYSTOLIC BLOOD PRESSURE: 100 MMHG

## 2024-08-28 DIAGNOSIS — Z30.9 ENCOUNTER FOR CONTRACEPTIVE MANAGEMENT, UNSPECIFIED TYPE: Primary | ICD-10-CM

## 2024-08-28 DIAGNOSIS — Z91.89 AT RISK FOR SEXUALLY TRANSMITTED DISEASE DUE TO UNPROTECTED SEX: ICD-10-CM

## 2024-08-28 DIAGNOSIS — Z13.228 ENCOUNTER FOR SCREENING FOR METABOLIC DISORDER: ICD-10-CM

## 2024-08-28 PROCEDURE — 3008F BODY MASS INDEX DOCD: CPT | Mod: CPTII,,, | Performed by: OBSTETRICS & GYNECOLOGY

## 2024-08-28 PROCEDURE — 1159F MED LIST DOCD IN RCRD: CPT | Mod: CPTII,,, | Performed by: OBSTETRICS & GYNECOLOGY

## 2024-08-28 PROCEDURE — 3078F DIAST BP <80 MM HG: CPT | Mod: CPTII,,, | Performed by: OBSTETRICS & GYNECOLOGY

## 2024-08-28 PROCEDURE — 99999 PR PBB SHADOW E&M-EST. PATIENT-LVL IV: CPT | Mod: PBBFAC,,, | Performed by: OBSTETRICS & GYNECOLOGY

## 2024-08-28 PROCEDURE — 99213 OFFICE O/P EST LOW 20 MIN: CPT | Mod: S$PBB,,, | Performed by: OBSTETRICS & GYNECOLOGY

## 2024-08-28 PROCEDURE — 87491 CHLMYD TRACH DNA AMP PROBE: CPT | Mod: ,,, | Performed by: CLINICAL MEDICAL LABORATORY

## 2024-08-28 PROCEDURE — 3074F SYST BP LT 130 MM HG: CPT | Mod: CPTII,,, | Performed by: OBSTETRICS & GYNECOLOGY

## 2024-08-28 PROCEDURE — 99214 OFFICE O/P EST MOD 30 MIN: CPT | Mod: PBBFAC | Performed by: OBSTETRICS & GYNECOLOGY

## 2024-08-28 PROCEDURE — 87591 N.GONORRHOEAE DNA AMP PROB: CPT | Mod: ,,, | Performed by: CLINICAL MEDICAL LABORATORY

## 2024-08-28 RX ORDER — NORETHINDRONE ACETATE AND ETHINYL ESTRADIOL 1MG-20(21)
1 KIT ORAL DAILY
Qty: 30 TABLET | Refills: 11 | Status: SHIPPED | OUTPATIENT
Start: 2024-08-28 | End: 2025-08-28

## 2024-08-28 RX ORDER — NORETHINDRONE ACETATE AND ETHINYL ESTRADIOL 1MG-20(21)
1 KIT ORAL DAILY
Qty: 30 TABLET | Refills: 11 | Status: SHIPPED | OUTPATIENT
Start: 2024-08-28 | End: 2024-08-28 | Stop reason: CLARIF

## 2024-08-28 NOTE — PROGRESS NOTES
Subjective:       Patient ID: Kd Joshua is a 24 y.o. female.    Chief Complaint: Well Woman (Here for check up-)    Presents for yearly check.    Patient is 24 years of age.    In May of 2023 she had an ASCUS Pap with positive other HPV.  A colposcopic exam was done July 2023.  Essentially negative examination.  Therefore biopsies not done.      A follow-up Pap done January 20, 2024 was negative but positive other HPV.      We have discussed HPV vaccination.  However she has previously had HPV vaccination through the health department.  This was done in childhood a proximally age 16.      She is presently on low-dose combination oral contraceptive.      States menses are regular light no significant dysmenorrhea.      Incidentally she has occasionally had some swelling along the right side of the mons pubis cyclic in nature with her menses suggestive of subcutaneous endometriosis.  However no symptoms at present.    She states with present oral contraceptives Microgestin 1/20 she only occasionally has this discomfort in the right side of the mons pubis area.    Review of Systems      Objective:      Physical Exam  Exam conducted with a chaperone present.   HENT:      Head: Normocephalic.      Nose: Nose normal.   Eyes:      Pupils: Pupils are equal, round, and reactive to light.   Cardiovascular:      Rate and Rhythm: Normal rate.   Pulmonary:      Effort: Pulmonary effort is normal.      Breath sounds: Normal breath sounds.   Chest:      Comments: Breasts without palpable masses no skin retraction or nipple dimpling.  Abdominal:      General: Abdomen is flat.      Palpations: Abdomen is soft.   Genitourinary:     General: Normal vulva.      Vagina: Normal.      Cervix: Normal.      Uterus: Normal.       Adnexa: Right adnexa normal and left adnexa normal.      Rectum: Normal.      Comments: External genitalia normal to appearance.  No swelling in mons pubis today.  Speculum exam revealed vaginal vault  normal cervix normal to appearance.  Previous Pap January 20, 2024 was negative positive other HPV Pap not repeated today GC and chlamydia cultures were done.  However no abnormal discharge.  Bimanual exam including rectovaginal exam revealed uterus normal size anteflexed ovaries are nonpalpable no adnexal masses no tenderness noted.  Musculoskeletal:         General: Normal range of motion.      Cervical back: Full passive range of motion without pain, normal range of motion and neck supple.   Skin:     General: Skin is dry.   Neurological:      General: No focal deficit present.      Mental Status: She is alert.   Psychiatric:         Attention and Perception: Attention normal.         Mood and Affect: Mood normal.       Assessment:       1. Encounter for contraceptive management, unspecified type    2. At risk for sexually transmitted disease due to unprotected sex    3. Encounter for screening for metabolic disorder        Plan:       Patient Instructions   Discussed normal gyn exam today.      Discussed that at her age of 24 I want to be conservative.  I do not feel that repeat colposcopic exam is indicated at present.    I recommend another Pap be done February 20, 2025.    She will notify her next provider of previous positive HPV     She will notify provider that she has had previous Gardasil vaccination series.      Continue Microgestin 1/20 at present.    Routine glucose cholesterol testing ordered.    Follow-up appointment with Dr. Childers February 20, 2025.

## 2024-08-28 NOTE — PATIENT INSTRUCTIONS
Discussed normal gyn exam today.      Discussed that at her age of 24 I want to be conservative.  I do not feel that repeat colposcopic exam is indicated at present.    I recommend another Pap be done February 20, 2025.    She will notify her next provider of previous positive HPV     She will notify provider that she has had previous Gardasil vaccination series.      Continue Microgestin 1/20 at present.    Routine glucose cholesterol testing ordered.    Follow-up appointment with Dr. Childers February 20, 2025.

## 2024-08-30 LAB
CHLAMYDIA BY PCR: NEGATIVE
N. GONORRHOEAE (GC) BY PCR: NEGATIVE

## 2024-09-04 ENCOUNTER — PATIENT MESSAGE (OUTPATIENT)
Dept: PAIN MEDICINE | Facility: CLINIC | Age: 24
End: 2024-09-04
Payer: COMMERCIAL

## 2024-09-10 ENCOUNTER — PATIENT MESSAGE (OUTPATIENT)
Dept: PAIN MEDICINE | Facility: CLINIC | Age: 24
End: 2024-09-10
Payer: COMMERCIAL

## 2024-10-01 ENCOUNTER — OFFICE VISIT (OUTPATIENT)
Dept: FAMILY MEDICINE | Facility: CLINIC | Age: 24
End: 2024-10-01
Payer: COMMERCIAL

## 2024-10-01 VITALS
RESPIRATION RATE: 16 BRPM | WEIGHT: 134 LBS | DIASTOLIC BLOOD PRESSURE: 80 MMHG | OXYGEN SATURATION: 98 % | HEART RATE: 96 BPM | BODY MASS INDEX: 21.53 KG/M2 | TEMPERATURE: 99 F | SYSTOLIC BLOOD PRESSURE: 119 MMHG | HEIGHT: 66 IN

## 2024-10-01 DIAGNOSIS — R09.82 PND (POST-NASAL DRIP): ICD-10-CM

## 2024-10-01 DIAGNOSIS — R05.1 ACUTE COUGH: ICD-10-CM

## 2024-10-01 DIAGNOSIS — J01.90 ACUTE NON-RECURRENT SINUSITIS, UNSPECIFIED LOCATION: Primary | ICD-10-CM

## 2024-10-01 DIAGNOSIS — J04.0 LARYNGITIS: ICD-10-CM

## 2024-10-01 DIAGNOSIS — J02.9 ACUTE PHARYNGITIS, UNSPECIFIED ETIOLOGY: ICD-10-CM

## 2024-10-01 LAB
CTP QC/QA: YES
MOLECULAR STREP A: NEGATIVE

## 2024-10-01 PROCEDURE — 99213 OFFICE O/P EST LOW 20 MIN: CPT | Mod: 25,,, | Performed by: NURSE PRACTITIONER

## 2024-10-01 PROCEDURE — 3008F BODY MASS INDEX DOCD: CPT | Mod: CPTII,,, | Performed by: NURSE PRACTITIONER

## 2024-10-01 PROCEDURE — 87651 STREP A DNA AMP PROBE: CPT | Mod: QW,,, | Performed by: NURSE PRACTITIONER

## 2024-10-01 PROCEDURE — 96372 THER/PROPH/DIAG INJ SC/IM: CPT | Mod: ,,, | Performed by: NURSE PRACTITIONER

## 2024-10-01 PROCEDURE — 3074F SYST BP LT 130 MM HG: CPT | Mod: CPTII,,, | Performed by: NURSE PRACTITIONER

## 2024-10-01 PROCEDURE — 3079F DIAST BP 80-89 MM HG: CPT | Mod: CPTII,,, | Performed by: NURSE PRACTITIONER

## 2024-10-01 PROCEDURE — 1159F MED LIST DOCD IN RCRD: CPT | Mod: CPTII,,, | Performed by: NURSE PRACTITIONER

## 2024-10-01 RX ORDER — AMOXICILLIN 875 MG/1
875 TABLET, FILM COATED ORAL EVERY 12 HOURS
Qty: 20 TABLET | Refills: 0 | Status: SHIPPED | OUTPATIENT
Start: 2024-10-01 | End: 2024-10-11

## 2024-10-01 RX ORDER — GUAIFENESIN AND PHENYLEPHRINE HCL 385; 10 MG/1; MG/1
1 TABLET ORAL 4 TIMES DAILY PRN
Qty: 20 TABLET | Refills: 0 | Status: SHIPPED | OUTPATIENT
Start: 2024-10-01

## 2024-10-01 RX ORDER — CYPROHEPTADINE HYDROCHLORIDE 4 MG/1
4 TABLET ORAL NIGHTLY
COMMUNITY
Start: 2024-09-25

## 2024-10-01 RX ORDER — DEXAMETHASONE SODIUM PHOSPHATE 4 MG/ML
8 INJECTION, SOLUTION INTRA-ARTICULAR; INTRALESIONAL; INTRAMUSCULAR; INTRAVENOUS; SOFT TISSUE ONCE
Status: COMPLETED | OUTPATIENT
Start: 2024-10-01 | End: 2024-10-01

## 2024-10-01 RX ORDER — ONDANSETRON 4 MG/1
4 TABLET, FILM COATED ORAL EVERY 8 HOURS PRN
COMMUNITY
Start: 2024-05-29

## 2024-10-01 RX ORDER — SERTRALINE HYDROCHLORIDE 100 MG/1
TABLET, FILM COATED ORAL
COMMUNITY
Start: 2024-08-28

## 2024-10-01 RX ORDER — PREDNISONE 20 MG/1
20 TABLET ORAL DAILY
Qty: 5 TABLET | Refills: 0 | Status: SHIPPED | OUTPATIENT
Start: 2024-10-01

## 2024-10-01 RX ADMIN — DEXAMETHASONE SODIUM PHOSPHATE 8 MG: 4 INJECTION, SOLUTION INTRA-ARTICULAR; INTRALESIONAL; INTRAMUSCULAR; INTRAVENOUS; SOFT TISSUE at 10:10

## 2024-10-01 NOTE — LETTER
October 1, 2024      [facility]  [address]  [place] [address]  Phone: [phone]  Fax: [phone]       Patient: Kd Joshua   YOB: 2000  Date of Visit: 10/01/2024    To Whom It May Concern:    Edgardo Joshua  was at [facility] on 10/01/2024. The patient may return to work/school on 10/03/2024 with no restrictions. If you have any questions or concerns, or if I can be of further assistance, please do not hesitate to contact me.    Sincerely,    EMILY Booker

## 2024-10-01 NOTE — PROGRESS NOTES
Subjective:       Patient ID: Kd Joshua is a 24 y.o. female.    Chief Complaint: Sore Throat (X 2-3 DAYS, HOARSE X1) and Cough    Sinus congestion, PND, Sore Throat (X 2-3 DAYS, HOARSE X1) and Cough (first morning from PND)      Sore Throat   Associated symptoms include congestion and coughing. Pertinent negatives include no abdominal pain, ear pain, headaches, neck pain, shortness of breath, trouble swallowing or vomiting.   Cough  Associated symptoms include postnasal drip and a sore throat. Pertinent negatives include no chest pain, ear pain, fever, headaches, rash or shortness of breath.     Review of Systems   Constitutional:  Negative for appetite change, fatigue and fever.   HENT:  Positive for nasal congestion, postnasal drip, sore throat and voice change. Negative for ear pain and trouble swallowing.    Eyes:  Negative for pain, discharge and itching.   Respiratory:  Positive for cough. Negative for shortness of breath.    Cardiovascular:  Negative for chest pain and leg swelling.   Gastrointestinal:  Negative for abdominal pain, change in bowel habit, nausea and vomiting.   Musculoskeletal:  Negative for back pain, gait problem and neck pain.   Integumentary:  Negative for rash and wound.   Allergic/Immunologic: Negative for immunocompromised state.   Neurological:  Negative for dizziness, weakness and headaches.   All other systems reviewed and are negative.        Objective:      Physical Exam  Vitals and nursing note reviewed.   Constitutional:       General: She is not in acute distress.     Appearance: Normal appearance. She is not ill-appearing, toxic-appearing or diaphoretic.   HENT:      Head: Normocephalic.      Right Ear: Tympanic membrane, ear canal and external ear normal.      Left Ear: Tympanic membrane, ear canal and external ear normal.      Nose: Mucosal edema, congestion and rhinorrhea present.      Right Turbinates: Swollen.      Left Turbinates: Swollen.      Mouth/Throat:       Mouth: Mucous membranes are moist.      Pharynx: Posterior oropharyngeal erythema present. No oropharyngeal exudate.   Eyes:      General: No scleral icterus.        Right eye: No discharge.         Left eye: No discharge.      Extraocular Movements: Extraocular movements intact.      Conjunctiva/sclera: Conjunctivae normal.      Pupils: Pupils are equal, round, and reactive to light.   Cardiovascular:      Rate and Rhythm: Normal rate and regular rhythm.      Pulses: Normal pulses.      Heart sounds: Normal heart sounds. No murmur heard.  Pulmonary:      Effort: Pulmonary effort is normal. No respiratory distress.      Breath sounds: Normal breath sounds. No wheezing, rhonchi or rales.   Musculoskeletal:         General: Normal range of motion.      Cervical back: Normal range of motion and neck supple. No tenderness.   Lymphadenopathy:      Cervical: No cervical adenopathy.   Skin:     General: Skin is warm and dry.      Capillary Refill: Capillary refill takes less than 2 seconds.      Findings: No rash.   Neurological:      Mental Status: She is alert and oriented to person, place, and time.   Psychiatric:         Mood and Affect: Mood normal.         Behavior: Behavior normal.         Thought Content: Thought content normal.         Judgment: Judgment normal.          Assessment:       1. Acute non-recurrent sinusitis, unspecified location    2. Acute pharyngitis, unspecified etiology    3. PND (post-nasal drip)    4. Acute cough    5. Laryngitis        Plan:   Acute non-recurrent sinusitis, unspecified location  -     dexAMETHasone injection 8 mg  -     phenylephrine-guaiFENesin (DECONEX IR)  mg Tab; Take 1 tablet by mouth 4 (four) times daily as needed (congestion).  Dispense: 20 tablet; Refill: 0  -     amoxicillin (AMOXIL) 875 MG tablet; Take 1 tablet (875 mg total) by mouth every 12 (twelve) hours. for 10 days  Dispense: 20 tablet; Refill: 0    Acute pharyngitis, unspecified etiology  -     POCT  Strep A, Molecular  -     dexAMETHasone injection 8 mg    PND (post-nasal drip)    Acute cough  -     phenylephrine-guaiFENesin (DECONEX IR)  mg Tab; Take 1 tablet by mouth 4 (four) times daily as needed (congestion).  Dispense: 20 tablet; Refill: 0    Laryngitis  -     dexAMETHasone injection 8 mg       Hold antibiotics for 3 days and start if symptoms worsens    Risks, benefits, and side effects were discussed with the patient. All questions were answered to the fullest satisfaction of the patient, and pt verbalized understanding and agreement to treatment plan. Pt was to call with any new or worsening symptoms, or present to the ER

## 2024-10-24 ENCOUNTER — OFFICE VISIT (OUTPATIENT)
Dept: PAIN MEDICINE | Facility: CLINIC | Age: 24
End: 2024-10-24
Payer: COMMERCIAL

## 2024-10-24 VITALS
HEIGHT: 66 IN | BODY MASS INDEX: 22.18 KG/M2 | DIASTOLIC BLOOD PRESSURE: 66 MMHG | WEIGHT: 138 LBS | HEART RATE: 120 BPM | RESPIRATION RATE: 16 BRPM | SYSTOLIC BLOOD PRESSURE: 132 MMHG

## 2024-10-24 DIAGNOSIS — M54.6 THORACIC SPINE PAIN: Chronic | ICD-10-CM

## 2024-10-24 DIAGNOSIS — G89.29 CHRONIC BILATERAL LOW BACK PAIN WITHOUT SCIATICA: Chronic | ICD-10-CM

## 2024-10-24 DIAGNOSIS — M41.9 SCOLIOSIS OF LUMBAR SPINE, UNSPECIFIED SCOLIOSIS TYPE: Chronic | ICD-10-CM

## 2024-10-24 DIAGNOSIS — M54.50 CHRONIC BILATERAL LOW BACK PAIN WITHOUT SCIATICA: Chronic | ICD-10-CM

## 2024-10-24 PROCEDURE — 3075F SYST BP GE 130 - 139MM HG: CPT | Mod: CPTII,,, | Performed by: PHYSICIAN ASSISTANT

## 2024-10-24 PROCEDURE — 99214 OFFICE O/P EST MOD 30 MIN: CPT | Mod: S$PBB,25,, | Performed by: PHYSICIAN ASSISTANT

## 2024-10-24 PROCEDURE — 3078F DIAST BP <80 MM HG: CPT | Mod: CPTII,,, | Performed by: PHYSICIAN ASSISTANT

## 2024-10-24 PROCEDURE — 99214 OFFICE O/P EST MOD 30 MIN: CPT | Mod: PBBFAC,25 | Performed by: PHYSICIAN ASSISTANT

## 2024-10-24 PROCEDURE — 99999PBSHW PR PBB SHADOW TECHNICAL ONLY FILED TO HB: Mod: JZ,PBBFAC,,

## 2024-10-24 PROCEDURE — 99999 PR PBB SHADOW E&M-EST. PATIENT-LVL IV: CPT | Mod: PBBFAC,,, | Performed by: PHYSICIAN ASSISTANT

## 2024-10-24 PROCEDURE — 1159F MED LIST DOCD IN RCRD: CPT | Mod: CPTII,,, | Performed by: PHYSICIAN ASSISTANT

## 2024-10-24 PROCEDURE — 96372 THER/PROPH/DIAG INJ SC/IM: CPT | Mod: PBBFAC | Performed by: PHYSICIAN ASSISTANT

## 2024-10-24 PROCEDURE — 3008F BODY MASS INDEX DOCD: CPT | Mod: CPTII,,, | Performed by: PHYSICIAN ASSISTANT

## 2024-10-24 RX ORDER — KETOROLAC TROMETHAMINE 30 MG/ML
60 INJECTION, SOLUTION INTRAMUSCULAR; INTRAVENOUS
Status: COMPLETED | OUTPATIENT
Start: 2024-10-24 | End: 2024-10-24

## 2024-10-24 RX ADMIN — KETOROLAC TROMETHAMINE 60 MG: 30 INJECTION, SOLUTION INTRAMUSCULAR at 03:10

## 2024-10-24 NOTE — PROGRESS NOTES
Subjective:         Patient ID: Kd Joshua is a 24 y.o. female.    Chief Complaint: Back Pain      Pain  This is a chronic problem. The current episode started more than 1 year ago. The problem occurs daily. The problem has been waxing and waning. Associated symptoms include arthralgias. Pertinent negatives include no anorexia, change in bowel habit, chest pain, chills, coughing, diaphoresis, fever, neck pain, sore throat, swollen glands, urinary symptoms, vertigo or vomiting.     Review of Systems   Constitutional:  Negative for activity change, appetite change, chills, diaphoresis, fever and unexpected weight change.   HENT:  Negative for drooling, ear discharge, ear pain, facial swelling, nosebleeds, sore throat, trouble swallowing, voice change and goiter.    Eyes:  Negative for photophobia, pain, discharge, redness and visual disturbance.   Respiratory:  Negative for apnea, cough, choking, chest tightness, shortness of breath, wheezing and stridor.    Cardiovascular:  Negative for chest pain, palpitations and leg swelling.   Gastrointestinal:  Negative for abdominal distention, anorexia, change in bowel habit, diarrhea, rectal pain, vomiting and fecal incontinence.   Endocrine: Negative for cold intolerance, heat intolerance, polydipsia, polyphagia and polyuria.   Genitourinary:  Negative for bladder incontinence, dysuria, flank pain, frequency and hot flashes.   Musculoskeletal:  Positive for arthralgias, back pain and leg pain. Negative for neck pain.   Integumentary:  Negative for color change and pallor.   Allergic/Immunologic: Negative for immunocompromised state.   Neurological:  Negative for dizziness, vertigo, seizures, syncope, facial asymmetry, speech difficulty, light-headedness, memory loss and coordination difficulties.   Hematological:  Negative for adenopathy. Does not bruise/bleed easily.   Psychiatric/Behavioral:  Negative for agitation, behavioral problems, confusion, decreased  concentration, dysphoric mood, hallucinations, self-injury and suicidal ideas. The patient is not nervous/anxious and is not hyperactive.            Past Medical History:   Diagnosis Date    Amenorrhea 2014    Started to regulate after 2years on depo (2019)    Anxiety 2020    Chlamydia infection     Depression 2020    Dyspareunia 2020    Endometriosis, herpes, birth control, cysts    Endometriosis     Heart murmur 2017    Benign; mother states has resolved since childhood    Herpes simplex virus (HSV) infection     Mental disorder     anexity and depression    Ovarian cyst     Scoliosis     STD (sexually transmitted disease) 2020    Herpes     Past Surgical History:   Procedure Laterality Date    ENDOMETRIAL ABLATION  2016    HYSTEROSCOPY      PELVIC LAPAROSCOPY  2022    SPINAL FUSION  10/03/2017    SURGICAL REMOVAL OF ENDOMETRIOSIS  2014    TONSILLECTOMY  2014     Social History     Socioeconomic History    Marital status: Single   Tobacco Use    Smoking status: Every Day     Current packs/day: 0.25     Average packs/day: 0.3 packs/day for 2.0 years (0.5 ttl pk-yrs)     Types: Cigarettes    Smokeless tobacco: Never   Substance and Sexual Activity    Alcohol use: Not Currently     Comment: rare    Drug use: Not Currently     Types: Marijuana     Comment: every couple of days    Sexual activity: Yes     Partners: Male     Birth control/protection: Condom, Rhythm, None     Comment: Last sexual intercourse 9/9/2021     Social Drivers of Health     Financial Resource Strain: High Risk (10/1/2024)    Overall Financial Resource Strain (CARDIA)     Difficulty of Paying Living Expenses: Hard   Food Insecurity: No Food Insecurity (10/1/2024)    Hunger Vital Sign     Worried About Running Out of Food in the Last Year: Never true     Ran Out of Food in the Last Year: Never true   Physical Activity: Insufficiently Active (10/1/2024)    Exercise Vital Sign     Days of Exercise per Week: 3 days     Minutes of Exercise per  "Session: 40 min   Stress: Patient Declined (10/1/2024)    [employer] - Occupational Stress Questionnaire     Feeling of Stress : Patient declined   Housing Stability: Unknown (10/1/2024)    Housing Stability Vital Sign     Unable to Pay for Housing in the Last Year: No     Family History   Problem Relation Name Age of Onset    Lupus Paternal Grandmother      Ovarian cancer Maternal Grandmother Daphney     Cancer Maternal Grandmother Daphney         Ovarian cancer    Diabetes Maternal Grandfather Horacio     Heart failure Maternal Grandfather Horacio     Breast cancer Maternal Aunt Ayala         Breast cancer reoccurring    Cancer Maternal Aunt Ayala     Cancer Maternal Uncle      Cancer Paternal Aunt Nataly     Diabetes Paternal Aunt Nataly         Diabetes    Heart failure Father Matty     Cancer Maternal Aunt Sharron         Breast cancer reoccurring    Heart failure Maternal Aunt Evy      Review of patient's allergies indicates:   Allergen Reactions    Codeine     Oxycodone Itching        Objective:  Vitals:    10/24/24 1431 10/24/24 1432   BP: 132/66    Pulse: (!) 120    Resp: 16    Weight: 62.6 kg (138 lb)    Height: 5' 6" (1.676 m)    PainSc:   8   8               Physical Exam  Vitals and nursing note reviewed. Exam conducted with a chaperone present.   Constitutional:       General: She is awake. She is not in acute distress.     Appearance: Normal appearance. She is not ill-appearing, toxic-appearing or diaphoretic.   HENT:      Head: Normocephalic and atraumatic.      Nose: Nose normal.      Mouth/Throat:      Mouth: Mucous membranes are moist.      Pharynx: Oropharynx is clear.   Eyes:      Conjunctiva/sclera: Conjunctivae normal.      Pupils: Pupils are equal, round, and reactive to light.   Cardiovascular:      Rate and Rhythm: Normal rate.   Pulmonary:      Effort: Pulmonary effort is normal. No respiratory distress.   Abdominal:      Palpations: Abdomen is soft.      Tenderness: " There is no guarding.   Musculoskeletal:         General: Normal range of motion.      Cervical back: Normal range of motion and neck supple. No rigidity.   Skin:     General: Skin is warm and dry.      Coloration: Skin is not jaundiced or pale.   Neurological:      General: No focal deficit present.      Mental Status: She is alert and oriented to person, place, and time. Mental status is at baseline.      Cranial Nerves: No cranial nerve deficit (II-XII).   Psychiatric:         Mood and Affect: Mood normal.         Behavior: Behavior normal. Behavior is cooperative.         Thought Content: Thought content normal.           CT Sinuses without Contrast  Narrative: EXAMINATION:  CT SINUSES WITHOUT CONTRAST    CLINICAL HISTORY:  Sinusitis, chronic or recurrent;  Chronic sinusitis, unspecified    TECHNIQUE:  CT of the paranasal sinuses performed without intravenous contrast.    The CT examination was performed using one or more of the following dose reduction techniques: Automated exposure control, adjustment of the mA and kV according to patient's size, use of acute or iterative reconstruction techniques.    COMPARISON:  12/14/2021    FINDINGS:  The frontal, ethmoid, and sphenoid sinuses are clear bilaterally.  Maxillary sinus clear bilaterally.  Drainage pathways are clear.  No air-fluid level.  No osseous erosion or osseous thickening.  Impression: Paranasal sinuses are clear bilaterally.    Electronically signed by: Angel Conroy  Date:    04/25/2024  Time:    14:22       Office Visit on 10/01/2024   Component Date Value Ref Range Status    Molecular Strep A, POC 10/01/2024 Negative  Negative Final     Acceptable 10/01/2024 Yes   Final   Office Visit on 08/28/2024   Component Date Value Ref Range Status    Chlamydia by PCR 08/28/2024 Negative  Negative, Invalid Final    N. gonorrhoeae (GC) by PCR 08/28/2024 Negative  Negative, Invalid Final         No orders of the defined types were placed in this  encounter.      Requested Prescriptions      No prescriptions requested or ordered in this encounter       Assessment:     1. Thoracic spine pain    2. Scoliosis of lumbar spine, unspecified scoliosis type    3. 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 Luciano, March 2024    Bring original prescription medication bottles/container/box with labels to each visit

## 2024-10-30 ENCOUNTER — PATIENT MESSAGE (OUTPATIENT)
Dept: PAIN MEDICINE | Facility: CLINIC | Age: 24
End: 2024-10-30
Payer: COMMERCIAL

## 2024-10-30 ENCOUNTER — TELEPHONE (OUTPATIENT)
Dept: PAIN MEDICINE | Facility: CLINIC | Age: 24
End: 2024-10-30
Payer: COMMERCIAL

## 2024-11-06 ENCOUNTER — OFFICE VISIT (OUTPATIENT)
Dept: PAIN MEDICINE | Facility: CLINIC | Age: 24
End: 2024-11-06
Payer: COMMERCIAL

## 2024-11-06 VITALS
WEIGHT: 138 LBS | HEIGHT: 66 IN | BODY MASS INDEX: 22.18 KG/M2 | RESPIRATION RATE: 16 BRPM | DIASTOLIC BLOOD PRESSURE: 64 MMHG | SYSTOLIC BLOOD PRESSURE: 113 MMHG | HEART RATE: 103 BPM

## 2024-11-06 DIAGNOSIS — M41.9 SCOLIOSIS OF LUMBAR SPINE, UNSPECIFIED SCOLIOSIS TYPE: Chronic | ICD-10-CM

## 2024-11-06 DIAGNOSIS — M54.6 THORACIC SPINE PAIN: Chronic | ICD-10-CM

## 2024-11-06 DIAGNOSIS — M54.50 CHRONIC BILATERAL LOW BACK PAIN WITHOUT SCIATICA: Primary | Chronic | ICD-10-CM

## 2024-11-06 DIAGNOSIS — G89.29 CHRONIC BILATERAL LOW BACK PAIN WITHOUT SCIATICA: Primary | Chronic | ICD-10-CM

## 2024-11-06 PROCEDURE — 99999 PR PBB SHADOW E&M-EST. PATIENT-LVL IV: CPT | Mod: PBBFAC,,, | Performed by: PHYSICIAN ASSISTANT

## 2024-11-06 PROCEDURE — 99214 OFFICE O/P EST MOD 30 MIN: CPT | Mod: PBBFAC | Performed by: PHYSICIAN ASSISTANT

## 2024-11-06 PROCEDURE — 1159F MED LIST DOCD IN RCRD: CPT | Mod: CPTII,,, | Performed by: PHYSICIAN ASSISTANT

## 2024-11-06 PROCEDURE — 3074F SYST BP LT 130 MM HG: CPT | Mod: CPTII,,, | Performed by: PHYSICIAN ASSISTANT

## 2024-11-06 PROCEDURE — 3008F BODY MASS INDEX DOCD: CPT | Mod: CPTII,,, | Performed by: PHYSICIAN ASSISTANT

## 2024-11-06 PROCEDURE — 3078F DIAST BP <80 MM HG: CPT | Mod: CPTII,,, | Performed by: PHYSICIAN ASSISTANT

## 2024-11-06 PROCEDURE — 99212 OFFICE O/P EST SF 10 MIN: CPT | Mod: S$PBB,,, | Performed by: PHYSICIAN ASSISTANT

## 2024-11-06 NOTE — PROGRESS NOTES
Subjective:         Patient ID: Kd Joshua is a 24 y.o. female.    Chief Complaint: Mid-back Pain and Low-back Pain      Pain  This is a chronic problem. The current episode started more than 1 year ago. The problem occurs daily. The problem has been waxing and waning. Associated symptoms include arthralgias. Pertinent negatives include no anorexia, change in bowel habit, chest pain, chills, coughing, diaphoresis, fever, neck pain, sore throat, swollen glands, urinary symptoms, vertigo or vomiting.     Review of Systems   Constitutional:  Negative for activity change, appetite change, chills, diaphoresis, fever and unexpected weight change.   HENT:  Negative for drooling, ear discharge, ear pain, facial swelling, nosebleeds, sore throat, trouble swallowing, voice change and goiter.    Eyes:  Negative for photophobia, pain, discharge, redness and visual disturbance.   Respiratory:  Negative for apnea, cough, choking, chest tightness, shortness of breath, wheezing and stridor.    Cardiovascular:  Negative for chest pain, palpitations and leg swelling.   Gastrointestinal:  Negative for abdominal distention, anorexia, change in bowel habit, diarrhea, rectal pain, vomiting and fecal incontinence.   Endocrine: Negative for cold intolerance, heat intolerance, polydipsia, polyphagia and polyuria.   Genitourinary:  Negative for bladder incontinence, dysuria, flank pain, frequency and hot flashes.   Musculoskeletal:  Positive for arthralgias, back pain and leg pain. Negative for neck pain.   Integumentary:  Negative for color change and pallor.   Allergic/Immunologic: Negative for immunocompromised state.   Neurological:  Negative for dizziness, vertigo, seizures, syncope, facial asymmetry, speech difficulty, light-headedness, memory loss and coordination difficulties.   Hematological:  Negative for adenopathy. Does not bruise/bleed easily.   Psychiatric/Behavioral:  Negative for agitation, behavioral problems,  confusion, decreased concentration, dysphoric mood, hallucinations, self-injury and suicidal ideas. The patient is not nervous/anxious and is not hyperactive.            Past Medical History:   Diagnosis Date    Amenorrhea 2014    Started to regulate after 2years on depo (2019)    Anxiety 2020    Chlamydia infection     Depression 2020    Dyspareunia 2020    Endometriosis, herpes, birth control, cysts    Endometriosis     Heart murmur 2017    Benign; mother states has resolved since childhood    Herpes simplex virus (HSV) infection     Mental disorder     anexity and depression    Ovarian cyst     Scoliosis     STD (sexually transmitted disease) 2020    Herpes     Past Surgical History:   Procedure Laterality Date    ENDOMETRIAL ABLATION  2016    HYSTEROSCOPY      PELVIC LAPAROSCOPY  2022    SPINAL FUSION  10/03/2017    SURGICAL REMOVAL OF ENDOMETRIOSIS  2014    TONSILLECTOMY  2014     Social History     Socioeconomic History    Marital status: Single   Tobacco Use    Smoking status: Every Day     Current packs/day: 0.25     Average packs/day: 0.3 packs/day for 2.0 years (0.5 ttl pk-yrs)     Types: Cigarettes    Smokeless tobacco: Never   Substance and Sexual Activity    Alcohol use: Not Currently     Comment: rare    Drug use: Not Currently     Types: Marijuana     Comment: every couple of days    Sexual activity: Yes     Partners: Male     Birth control/protection: Condom, Rhythm, None     Comment: Last sexual intercourse 9/9/2021     Social Drivers of Health     Financial Resource Strain: High Risk (10/1/2024)    Overall Financial Resource Strain (CARDIA)     Difficulty of Paying Living Expenses: Hard   Food Insecurity: No Food Insecurity (10/1/2024)    Hunger Vital Sign     Worried About Running Out of Food in the Last Year: Never true     Ran Out of Food in the Last Year: Never true   Physical Activity: Insufficiently Active (10/1/2024)    Exercise Vital Sign     Days of Exercise per Week: 3 days     Minutes of  "Exercise per Session: 40 min   Stress: Patient Declined (10/1/2024)    [employer] - Occupational Stress Questionnaire     Feeling of Stress : Patient declined   Housing Stability: Unknown (10/1/2024)    Housing Stability Vital Sign     Unable to Pay for Housing in the Last Year: No     Family History   Problem Relation Name Age of Onset    Lupus Paternal Grandmother      Ovarian cancer Maternal Grandmother Daphney     Cancer Maternal Grandmother Daphney         Ovarian cancer    Diabetes Maternal Grandfather Horacio     Heart failure Maternal Grandfather Horacio     Breast cancer Maternal Aunt Ayala         Breast cancer reoccurring    Cancer Maternal Aunt Ayala     Cancer Maternal Uncle      Cancer Paternal Aunt Nataly     Diabetes Paternal Aunt Nataly         Diabetes    Heart failure Father Matty     Cancer Maternal Aunt Sharron         Breast cancer reoccurring    Heart failure Maternal Aunt Evy      Review of patient's allergies indicates:   Allergen Reactions    Codeine     Oxycodone Itching        Objective:  Vitals:    11/06/24 1424   BP: 113/64   Pulse: 103   Resp: 16   Weight: 62.6 kg (138 lb)   Height: 5' 6" (1.676 m)   PainSc:   7               Physical Exam  Vitals and nursing note reviewed. Exam conducted with a chaperone present.   Constitutional:       General: She is awake. She is not in acute distress.     Appearance: Normal appearance. She is not ill-appearing, toxic-appearing or diaphoretic.   HENT:      Head: Normocephalic and atraumatic.      Nose: Nose normal.      Mouth/Throat:      Mouth: Mucous membranes are moist.      Pharynx: Oropharynx is clear.   Eyes:      Conjunctiva/sclera: Conjunctivae normal.      Pupils: Pupils are equal, round, and reactive to light.   Cardiovascular:      Rate and Rhythm: Normal rate.   Pulmonary:      Effort: Pulmonary effort is normal. No respiratory distress.   Abdominal:      Palpations: Abdomen is soft.      Tenderness: There is no " guarding.   Musculoskeletal:         General: Normal range of motion.      Cervical back: Normal range of motion and neck supple. No rigidity.   Skin:     General: Skin is warm and dry.      Coloration: Skin is not jaundiced or pale.   Neurological:      General: No focal deficit present.      Mental Status: She is alert and oriented to person, place, and time. Mental status is at baseline.      Cranial Nerves: No cranial nerve deficit (II-XII).   Psychiatric:         Mood and Affect: Mood normal.         Behavior: Behavior normal. Behavior is cooperative.         Thought Content: Thought content normal.           CT Sinuses without Contrast  Narrative: EXAMINATION:  CT SINUSES WITHOUT CONTRAST    CLINICAL HISTORY:  Sinusitis, chronic or recurrent;  Chronic sinusitis, unspecified    TECHNIQUE:  CT of the paranasal sinuses performed without intravenous contrast.    The CT examination was performed using one or more of the following dose reduction techniques: Automated exposure control, adjustment of the mA and kV according to patient's size, use of acute or iterative reconstruction techniques.    COMPARISON:  12/14/2021    FINDINGS:  The frontal, ethmoid, and sphenoid sinuses are clear bilaterally.  Maxillary sinus clear bilaterally.  Drainage pathways are clear.  No air-fluid level.  No osseous erosion or osseous thickening.  Impression: Paranasal sinuses are clear bilaterally.    Electronically signed by: Angel Conroy  Date:    04/25/2024  Time:    14:22       Office Visit on 10/01/2024   Component Date Value Ref Range Status    Molecular Strep A, POC 10/01/2024 Negative  Negative Final     Acceptable 10/01/2024 Yes   Final   Office Visit on 08/28/2024   Component Date Value Ref Range Status    Chlamydia by PCR 08/28/2024 Negative  Negative, Invalid Final    N. gonorrhoeae (GC) by PCR 08/28/2024 Negative  Negative, Invalid Final         No orders of the defined types were placed in this  encounter.      Requested Prescriptions      No prescriptions requested or ordered in this encounter       Assessment:     1. Chronic bilateral low back pain without sciatica    2. Thoracic spine pain    3. 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 subsequent explant of hardware due to pain      Having difficulty obtaining her pregabalin prescription     Advised the patient insurance will not cover pregabalin, our office as requested prior authorization multiple times    She verbalized family practice provider providers will not write pregabalin has been referred to Pain Treatment    Explained to the patient I can not for C insurance company to pay for the pregabalin     Discussed changing to gabapentin     Patient states it does not help    Patient left the clinic on happy    Continue current medication    Continue home exercise program as directed    Follow-up 12 months    Dr. Luciano, March 2024    Bring original prescription medication bottles/container/box with labels to each visit

## 2025-04-09 ENCOUNTER — TELEPHONE (OUTPATIENT)
Dept: OBSTETRICS AND GYNECOLOGY | Facility: CLINIC | Age: 25
End: 2025-04-09
Payer: COMMERCIAL

## 2025-07-28 ENCOUNTER — OFFICE VISIT (OUTPATIENT)
Dept: OBSTETRICS AND GYNECOLOGY | Facility: CLINIC | Age: 25
End: 2025-07-28
Payer: COMMERCIAL

## 2025-07-28 VITALS
HEIGHT: 66 IN | OXYGEN SATURATION: 99 % | HEART RATE: 79 BPM | DIASTOLIC BLOOD PRESSURE: 70 MMHG | BODY MASS INDEX: 22.5 KG/M2 | SYSTOLIC BLOOD PRESSURE: 112 MMHG | WEIGHT: 140 LBS | RESPIRATION RATE: 19 BRPM

## 2025-07-28 DIAGNOSIS — Z86.2 HISTORY OF ANEMIA: ICD-10-CM

## 2025-07-28 DIAGNOSIS — E55.9 VITAMIN D DEFICIENCY: ICD-10-CM

## 2025-07-28 DIAGNOSIS — Z87.42 HISTORY OF ABNORMAL CERVICAL PAP SMEAR: Primary | ICD-10-CM

## 2025-07-28 DIAGNOSIS — N80.9 ENDOMETRIOSIS: ICD-10-CM

## 2025-07-28 DIAGNOSIS — R53.83 FATIGUE, UNSPECIFIED TYPE: ICD-10-CM

## 2025-07-28 PROCEDURE — 3078F DIAST BP <80 MM HG: CPT | Mod: CPTII,,, | Performed by: ADVANCED PRACTICE MIDWIFE

## 2025-07-28 PROCEDURE — 1159F MED LIST DOCD IN RCRD: CPT | Mod: CPTII,,, | Performed by: ADVANCED PRACTICE MIDWIFE

## 2025-07-28 PROCEDURE — 99214 OFFICE O/P EST MOD 30 MIN: CPT | Mod: PBBFAC | Performed by: ADVANCED PRACTICE MIDWIFE

## 2025-07-28 PROCEDURE — 99214 OFFICE O/P EST MOD 30 MIN: CPT | Mod: S$PBB,,, | Performed by: ADVANCED PRACTICE MIDWIFE

## 2025-07-28 PROCEDURE — 3074F SYST BP LT 130 MM HG: CPT | Mod: CPTII,,, | Performed by: ADVANCED PRACTICE MIDWIFE

## 2025-07-28 PROCEDURE — 3008F BODY MASS INDEX DOCD: CPT | Mod: CPTII,,, | Performed by: ADVANCED PRACTICE MIDWIFE

## 2025-07-28 PROCEDURE — 99999 PR PBB SHADOW E&M-EST. PATIENT-LVL IV: CPT | Mod: PBBFAC,,, | Performed by: ADVANCED PRACTICE MIDWIFE

## 2025-07-28 NOTE — PROGRESS NOTES
Subjective     Patient ID: Kd Joshua is a 25 y.o. female.    Chief Complaint: Gynecologic Exam (Denies any problems at this time. Pts last pap was in 5/2025. Positive for HPV other by Dr. Coyle in 2024./)    Last pap was done May 2025 per AMY Odom and was negative.   History of HPV other with paps in the past.  Has had ASCUS and LGSIL paps previously.  Pap in 2024 was negative with HPV other.   Has had HPV vaccine.   Has swelling to groin area that is tender with cycles.   Cycles are irregular , coming about every 3 weeks lasting 4 days. Cramping, nausea and vomiting with cycles.   Reports hx endometriosis  Has tried contraception in the past, progesterone only pills, combination pills, and depo provera.   Suicidal thoughts while on Depo Provera in the past.   C/O fatigue.      Gynecologic Exam  The patient's primary symptoms include pelvic pain. The patient's pertinent negatives include no genital itching, genital lesions, genital odor, genital rash, missed menses, vaginal bleeding or vaginal discharge. This is a chronic problem. The current episode started more than 1 month ago. The problem occurs constantly. The problem has been waxing and waning. The pain is moderate. The problem affects the right side. She is not pregnant. Associated symptoms include anorexia, back pain, constipation and painful intercourse. Pertinent negatives include no abdominal pain, chills, diarrhea, discolored urine, dysuria, fever, flank pain, frequency, headaches, hematuria, nausea, rash, urgency or vomiting. The vaginal discharge was normal. The vaginal bleeding is typical of menses. She has been passing clots. She has not been passing tissue. The symptoms are aggravated by activity, bowel movements, eating, heavy lifting, intercourse and urinating. She has tried acetaminophen, heating pad, NSAIDs, oral narcotics and warm bath for the symptoms. The treatment provided no relief. She is sexually active. No, her partner does  not have an STD. She uses nothing for contraception. Her menstrual history has been irregular. Her past medical history is significant for endometriosis, herpes simplex and an STD. There is no history of an abdominal surgery, a  section, an ectopic pregnancy, a gynecological surgery, menorrhagia, metrorrhagia, miscarriage, ovarian cysts, perineal abscess, PID, a terminated pregnancy or vaginosis.     Review of Systems   Constitutional: Negative.  Negative for chills and fever.   HENT: Negative.     Eyes: Negative.    Respiratory: Negative.     Cardiovascular: Negative.    Gastrointestinal:  Positive for anorexia and constipation. Negative for abdominal pain, diarrhea, nausea and vomiting.   Endocrine: Negative.    Genitourinary:  Positive for menstrual irregularity and pelvic pain. Negative for dysuria, flank pain, frequency, hematuria, menorrhagia, missed menses, urgency and vaginal discharge.   Musculoskeletal:  Positive for back pain.   Integumentary:  Negative for rash. Negative.   Allergic/Immunologic: Negative.    Neurological: Negative.  Negative for headaches.   Psychiatric/Behavioral: Negative.       Past Medical History:   Diagnosis Date    Amenorrhea 2014    Started to regulate after 2years on depo (2019)    Anxiety 2020    Chlamydia infection     Depression 2020    Dyspareunia 2020    Endometriosis, herpes, birth control, cysts    Endometriosis     Heart murmur 2017    Benign; mother states has resolved since childhood    Herpes simplex virus (HSV) infection     Mental disorder     anexity and depression    Ovarian cyst     Scoliosis     STD (sexually transmitted disease) 2020    Herpes     Past Surgical History:   Procedure Laterality Date    ENDOMETRIAL ABLATION  2016    HYSTEROSCOPY      PELVIC LAPAROSCOPY      SPINAL FUSION  10/03/2017    SURGICAL REMOVAL OF ENDOMETRIOSIS  2014    TONSILLECTOMY        OB History    Para Term  AB Living   0 0 0 0 0 0   SAB IAB Ectopic  Multiple Live Births   0 0 0 0 0      Current Outpatient Medications   Medication Instructions    amitriptyline (ELAVIL) 25 mg, Oral, Nightly    baclofen (LIORESAL) 10 mg, Oral, 3 times daily    cyproheptadine (PERIACTIN) 4 mg, Nightly    hydrOXYzine pamoate (VISTARIL) 25 mg, Oral, Every 8 hours    phenylephrine-guaiFENesin (DECONEX IR)  mg Tab 1 tablet, Oral, 4 times daily PRN    sertraline (ZOLOFT) 100 MG tablet TAKE 1 TABLET BY MOUTH DAILY FOR DEPRESSION OR ANXIETY         Objective   Vitals:    07/28/25 1311   BP: 112/70   Pulse: 79   Resp: 19     Wt Readings from Last 2 Encounters:   07/28/25 63.5 kg (140 lb)   11/06/24 62.6 kg (138 lb)      Physical Exam  Vitals reviewed.   Constitutional:       Appearance: Normal appearance.   HENT:      Head: Normocephalic.   Cardiovascular:      Rate and Rhythm: Normal rate and regular rhythm.   Pulmonary:      Effort: Pulmonary effort is normal.      Breath sounds: Normal breath sounds.   Abdominal:      General: Abdomen is flat.      Palpations: Abdomen is soft.   Musculoskeletal:         General: Normal range of motion.      Cervical back: Normal range of motion.   Skin:     General: Skin is warm and dry.   Neurological:      Mental Status: She is alert and oriented to person, place, and time.   Psychiatric:         Mood and Affect: Mood normal.         Behavior: Behavior normal.        Assessment and Plan   1. History of abnormal cervical Pap smear    2. Fatigue, unspecified type  -     TSH; Future; Expected date: 07/28/2025  -     T4, Free; Future; Expected date: 07/28/2025    3. History of anemia  -     CBC Auto Differential; Future; Expected date: 07/28/2025  -     Iron and TIBC; Future; Expected date: 07/28/2025  -     Ferritin; Future; Expected date: 07/28/2025    4. Vitamin D deficiency  -     Vitamin D; Future; Expected date: 07/28/2025      Track cycles  Discussed pap recommendations and plan to repeat pap in 1 year  Labs today     Follow up in about 1  year (around 7/28/2026), or if symptoms worsen or fail to improve, for Annual Exam.